# Patient Record
Sex: FEMALE | Race: WHITE | NOT HISPANIC OR LATINO | Employment: FULL TIME | URBAN - METROPOLITAN AREA
[De-identification: names, ages, dates, MRNs, and addresses within clinical notes are randomized per-mention and may not be internally consistent; named-entity substitution may affect disease eponyms.]

---

## 2017-05-25 DIAGNOSIS — Z85.3 HISTORY OF BREAST CANCER: Primary | ICD-10-CM

## 2017-05-31 ENCOUNTER — PATIENT MESSAGE (OUTPATIENT)
Dept: INTERNAL MEDICINE | Facility: CLINIC | Age: 62
End: 2017-05-31

## 2017-05-31 ENCOUNTER — PATIENT MESSAGE (OUTPATIENT)
Dept: SURGERY | Facility: CLINIC | Age: 62
End: 2017-05-31

## 2017-05-31 DIAGNOSIS — Z00.00 ANNUAL PHYSICAL EXAM: Primary | ICD-10-CM

## 2017-05-31 NOTE — TELEPHONE ENCOUNTER
notify pt her labs have been entered  For lipid profile, vit d and also would like update thyroid  Order in for quest electronically   She will need to make sure they draw both doctors labs  When she goes

## 2017-06-15 LAB
1,25(OH)2D SERPL-MCNC: 46 PG/ML (ref 18–72)
1,25(OH)2D2 SERPL-MCNC: <8 PG/ML
1,25(OH)2D3 SERPL-MCNC: 46 PG/ML
CHOLEST SERPL-MCNC: 189 MG/DL (ref 125–200)
CHOLEST/HDLC SERPL: 4.8 (CALC)
HDLC SERPL-MCNC: 39 MG/DL
LDLC SERPL CALC-MCNC: 98 MG/DL (CALC)
NONHDLC SERPL-MCNC: 150 MG/DL (CALC)
TRIGL SERPL-MCNC: 259 MG/DL
TSH SERPL-ACNC: 1.94 MIU/L (ref 0.4–4.5)

## 2017-06-21 ENCOUNTER — PATIENT MESSAGE (OUTPATIENT)
Dept: HEMATOLOGY/ONCOLOGY | Facility: CLINIC | Age: 62
End: 2017-06-21

## 2017-06-27 ENCOUNTER — OFFICE VISIT (OUTPATIENT)
Dept: INTERNAL MEDICINE | Facility: CLINIC | Age: 62
End: 2017-06-27
Payer: COMMERCIAL

## 2017-06-27 VITALS
WEIGHT: 178.81 LBS | BODY MASS INDEX: 31.68 KG/M2 | DIASTOLIC BLOOD PRESSURE: 74 MMHG | HEIGHT: 63 IN | SYSTOLIC BLOOD PRESSURE: 114 MMHG | HEART RATE: 82 BPM

## 2017-06-27 DIAGNOSIS — I10 ESSENTIAL HYPERTENSION: ICD-10-CM

## 2017-06-27 DIAGNOSIS — Z85.3 PERSONAL HISTORY OF BREAST CANCER: ICD-10-CM

## 2017-06-27 DIAGNOSIS — Z00.00 ANNUAL PHYSICAL EXAM: Primary | ICD-10-CM

## 2017-06-27 DIAGNOSIS — E78.2 ELEVATED TRIGLYCERIDES WITH HIGH CHOLESTEROL: ICD-10-CM

## 2017-06-27 PROCEDURE — 99396 PREV VISIT EST AGE 40-64: CPT | Mod: S$GLB,,, | Performed by: INTERNAL MEDICINE

## 2017-06-27 PROCEDURE — 99999 PR PBB SHADOW E&M-EST. PATIENT-LVL III: CPT | Mod: PBBFAC,,, | Performed by: INTERNAL MEDICINE

## 2017-06-27 RX ORDER — LISINOPRIL 10 MG/1
TABLET ORAL
Qty: 90 TABLET | Refills: 3 | Status: SHIPPED | OUTPATIENT
Start: 2017-06-27 | End: 2017-08-15 | Stop reason: SDUPTHER

## 2017-06-27 NOTE — PROGRESS NOTES
"Subjective:       Patient ID: Marti Coley is a 61 y.o. female.    Chief Complaint: Annual Exam   this is a 61-year-old who presents today for follow-up checkup.  Patient reports in general she has been doing fairly well .she has been working on trying to get her weight down and exercising a bit more watching sugar intake.  She has follow-up appointment with hematology oncology in the near future and her annual mammogram scheduled.  She did have some blood work prior.  Patient reports she is off for the summer and looking forward to exercising a bit more as well during that timeframe.  She denies chest pain or shortness of breath she's had problems with ongoing loss of smell that has been present for a few years since she had an upper respiratory infection never recovered but she has no further nasal congestion or issues except occasional rare sinus drip she is not using any new medications and denies headaches associated.    HPI     Review of Systems   Constitutional: Negative for activity change and unexpected weight change.   HENT: Positive for rhinorrhea. Negative for hearing loss and trouble swallowing.         Loss of smell    Eyes: Negative for discharge and visual disturbance.   Respiratory: Negative for chest tightness and wheezing.    Cardiovascular: Negative for chest pain and palpitations.   Gastrointestinal: Negative for blood in stool, constipation, diarrhea and vomiting.   Endocrine: Negative for polydipsia and polyuria.   Genitourinary: Negative for difficulty urinating, dysuria, hematuria and menstrual problem.   Musculoskeletal: Negative for arthralgias, joint swelling and neck pain.   Neurological: Negative for weakness and headaches.   Psychiatric/Behavioral: Negative for confusion and dysphoric mood.       Objective:     Blood pressure 114/74, pulse 82, height 5' 3" (1.6 m), weight 81.1 kg (178 lb 12.7 oz).    Physical Exam   Constitutional: No distress.   HENT:   Head: Normocephalic. "   Mouth/Throat: Oropharynx is clear and moist.   Alopecia    Eyes: No scleral icterus.   Neck: Neck supple.   Cardiovascular: Normal rate, regular rhythm and normal heart sounds.  Exam reveals no gallop and no friction rub.    No murmur heard.  Pulmonary/Chest: Effort normal and breath sounds normal. No respiratory distress.   Surgical scar radiation changes    Abdominal: Soft. Bowel sounds are normal. She exhibits no mass. There is no tenderness.   Musculoskeletal: She exhibits no edema.   Neurological: She is alert.   Skin: No erythema.   Psychiatric: She has a normal mood and affect.   Vitals reviewed.      Assessment:       1. Annual physical exam    2. Personal history of breast cancer    3. Essential hypertension    4. Elevated triglycerides with high cholesterol        Plan:       Marti was seen today for annual exam.    Diagnoses and all orders for this visit:    Annual physical exam    Personal history of breast cancer she will keep her upcoming appointment with hematology and breast mammogram as planned      Essential hypertension blood pressure acceptable continue present regimen      Elevated triglycerides with high cholesterolDiscussed with patient reinforced regular exercise dietary measures and continue present fish oil  Continue exercise weight loss      Hypertension blood pressure acceptable continue present regimen  -     lisinopril 10 MG tablet; TAKE 1 TABLET (10 MG TOTAL) BY MOUTH ONCE DAILY.    Discussed chronic loss of smell we discussed ENT appointment for further evaluation she declines but will call if she would like to consider  Recent labs reviewed     follow-up annually sooner if concern

## 2017-06-29 ENCOUNTER — OFFICE VISIT (OUTPATIENT)
Dept: OBSTETRICS AND GYNECOLOGY | Facility: CLINIC | Age: 62
End: 2017-06-29
Attending: OBSTETRICS & GYNECOLOGY
Payer: COMMERCIAL

## 2017-06-29 VITALS
DIASTOLIC BLOOD PRESSURE: 82 MMHG | WEIGHT: 174.63 LBS | HEIGHT: 63 IN | BODY MASS INDEX: 30.94 KG/M2 | SYSTOLIC BLOOD PRESSURE: 108 MMHG

## 2017-06-29 DIAGNOSIS — Z85.3 PERSONAL HISTORY OF BREAST CANCER: ICD-10-CM

## 2017-06-29 DIAGNOSIS — N76.0 VULVOVAGINITIS: ICD-10-CM

## 2017-06-29 DIAGNOSIS — Z01.419 ENCOUNTER FOR GYNECOLOGICAL EXAMINATION WITHOUT ABNORMAL FINDING: Primary | ICD-10-CM

## 2017-06-29 PROCEDURE — 87480 CANDIDA DNA DIR PROBE: CPT

## 2017-06-29 PROCEDURE — 99999 PR PBB SHADOW E&M-EST. PATIENT-LVL III: CPT | Mod: PBBFAC,,, | Performed by: OBSTETRICS & GYNECOLOGY

## 2017-06-29 PROCEDURE — 99396 PREV VISIT EST AGE 40-64: CPT | Mod: S$GLB,,, | Performed by: OBSTETRICS & GYNECOLOGY

## 2017-06-29 RX ORDER — CLOTRIMAZOLE AND BETAMETHASONE DIPROPIONATE 10; .64 MG/G; MG/G
CREAM TOPICAL
Qty: 45 G | Refills: 6 | Status: SHIPPED | OUTPATIENT
Start: 2017-06-29 | End: 2018-05-29

## 2017-06-29 NOTE — PROGRESS NOTES
"  Subjective:       Patient ID: Marti Coley is a 61 y.o. female.    Chief Complaint:  Well Woman and Vaginitis (Pt says that she has a "chronic yeast infection" and many efforts have been made to control it over the years but nothing has been successful as of yet.)      History of Present Illness  HPI  Marti Coley is a 61 y.o. female  here for her annual GYN exam.  She reports recurrent yeast infection/vulvar irritation. Biopsy done a few years ago just showed chronic inflammation c/w yeast infection..  She describes her periods as stopped  with Chemo/Menopause.  Denies break through /Postmenopausal bleeding.   Denies vaginal itching or irritation.  Denies vaginal discharge.  She is sexually active. She has had 1 partner for the past 24 years .   History of abnormal pap: No  Last Pap: approximate date  and was normal  Last MMG: normal--routine follow-up in 12 months  Last Colonoscopy:  colonoscopy   without abnormalities.  Denies domestic violence. She does feel safe at home.     Past Medical History:   Diagnosis Date    Allergy     Breast cancer     left diagnosed in  - underwent chemotherapy and radiation after lumpectomy    Cancer 2008    left IDC, neoadjuvant chemo, 7mm residual IDC lumpectomy, negative Sn biopsy, adjuvant XRT and hormonal therapy with tamoxifen, ER/SC+, HER-2 negtive    Genetic testing 2008     negative MultiSite BRACAnalysis    HTN (hypertension)     Hyperlipidemia     Osteoarthritis      Past Surgical History:   Procedure Laterality Date    BREAST SURGERY  2008    left lumpectomy with negative SN biopsy    ENDOMETRIAL BIOPSY  11     Social History     Social History    Marital status:      Spouse name: N/A    Number of children: 0    Years of education: N/A     Occupational History     [employer]     Social History Main Topics    Smoking status: Never Smoker    Smokeless tobacco: Never " "Used    Alcohol use No      Comment: allergic    Drug use: No    Sexual activity: Yes     Partners: Male     Birth control/ protection: Post-menopausal      Comment: teacher,   since , 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 Also walks dog.     Family History   Problem Relation Age of Onset    Breast cancer Mother 80    Hypertension Mother     Hyperlipidemia Mother     Breast cancer Sister 32    Cancer Sister      thyroid and breast cancer     Endometrial cancer Sister     Uterine cancer Sister 55     endometrial cancer, diagnosed @ time of hysterectomy for fibroids    Breast cancer Paternal Grandmother 80    Heart disease Father     Cancer Father      bladder cancer     COPD Father     Melanoma Brother     Heart disease Brother     Cancer Brother      melanoma     Ovarian cancer Neg Hx     Diabetes Neg Hx     Stroke Neg Hx      OB History      Para Term  AB Living    0              SAB TAB Ectopic Multiple Live Births                       /82   Ht 5' 3" (1.6 m)   Wt 79.2 kg (174 lb 9.7 oz)   BMI 30.93 kg/m²         GYN & OB History  No LMP recorded. Patient is postmenopausal.   Date of Last Pap: 2016    OB History    Para Term  AB Living   0             SAB TAB Ectopic Multiple Live Births                         Review of Systems  Review of Systems   Constitutional: Negative for activity change, appetite change, fatigue and unexpected weight change.   HENT: Negative.    Eyes: Negative for visual disturbance.   Respiratory: Negative for shortness of breath and wheezing.    Cardiovascular: Negative for chest pain, palpitations and leg swelling.   Gastrointestinal: Negative for abdominal pain, bloating and blood in stool.   Endocrine: Positive for hair loss and hot flashes. Negative for diabetes.   Genitourinary: Negative for decreased libido, " dyspareunia and postmenopausal bleeding.   Musculoskeletal: Negative for back pain and joint swelling.   Skin:  Negative for no acne and hair changes.   Neurological: Negative for headaches.   Hematological: Does not bruise/bleed easily.   Psychiatric/Behavioral: Positive for sleep disturbance. Negative for depression. The patient is not nervous/anxious.    Breast: Negative for breast pain and nipple discharge          Objective:    Physical Exam:   Constitutional: She is oriented to person, place, and time. She appears well-developed and well-nourished.    HENT:   Head: Normocephalic and atraumatic.    Eyes: EOM are normal. Pupils are equal, round, and reactive to light.    Neck: Normal range of motion. Neck supple.    Cardiovascular: Normal rate and regular rhythm.     Pulmonary/Chest: Effort normal and breath sounds normal.   BREASTS: Symmetrical, no skin changes or visible lesions.  No palpable masses, nipple discharge bilaterally.          Abdominal: Soft. Bowel sounds are normal.     Genitourinary:       Pelvic exam was performed with patient supine.   Genitourinary Comments: PELVIC: Normal external genitalia without lesions. Erythema and minor excoriation RIght labia minora/majora; Normal hair distribution.  Adequate perineal body, normal urethral meatus.  Vagina  Dry and poorly rugated, atrophic, without lesions or discharge.  Cervix pink, without lesions, discharge or tenderness.  No significant cystocele or rectocele.  Bimanual exam shows uterus to be normal size, regular, mobile and nontender.  Adnexa without masses or tenderness.    RECTAL:Deferred             Musculoskeletal: Normal range of motion and moves all extremeties.       Neurological: She is alert and oriented to person, place, and time.    Skin: Skin is warm and dry.    Psychiatric: She has a normal mood and affect.          Assessment:        1. Encounter for gynecological examination without abnormal finding    2. Personal history of breast  cancer    3. Vulvovaginitis               Plan:      1. Encounter for gynecological examination without abnormal finding  COUNSELING:  The patient was counseled today on osteoporosis prevention, calcium supplementation, and regular weight bearing exercise. The patient was also counseled today on ACS PAP guidelines, with recommendations for yearly pelvic exams unless their uterus, cervix, and ovaries were removed for benign reasons; in that case, examinations every 3-5 years are recommended. The patient was also counseled regarding monthly breast self-examination, routine STD screening for at-risk populations, prophylactic immunizations for transmitted infections such as HPV, Pertussis, or Influenza as appropriate, and yearly mammograms when indicated by ACS guidelines. She was advised to see her primary care physician for all other health maintenance.   FOLLOW-UP with me for next routine visit.         2. Personal history of breast cancer      3. Vulvovaginitis    - Vaginosis Screen by DNA Probe  - clotrimazole-betamethasone 1-0.05% (LOTRISONE) cream; Apply topically bid  Dispense: 45 g; Refill: 6       Return in about 1 year (around 6/29/2018).

## 2017-06-30 LAB
CANDIDA RRNA VAG QL PROBE: NEGATIVE
G VAGINALIS RRNA GENITAL QL PROBE: NEGATIVE
T VAGINALIS RRNA GENITAL QL PROBE: NEGATIVE

## 2017-07-06 ENCOUNTER — TELEPHONE (OUTPATIENT)
Dept: SURGERY | Facility: CLINIC | Age: 62
End: 2017-07-06

## 2017-07-06 NOTE — TELEPHONE ENCOUNTER
Called patient regarding appointment scheduled with Ángela Massey NP for breast exam and mammogram on Monday 7/31/17.  Patient informed that Ángela will be out of the office on that day and appointments will need to be rescheduled.  The patient has been rescheduled for Thursday 7/27/17 10:30 am mammogram and 11 am breast exam.  The patient voiced understanding of appointment dates and times.  Reminder letter mailed to the patient.

## 2017-07-14 ENCOUNTER — OFFICE VISIT (OUTPATIENT)
Dept: HEMATOLOGY/ONCOLOGY | Facility: CLINIC | Age: 62
End: 2017-07-14
Attending: INTERNAL MEDICINE
Payer: COMMERCIAL

## 2017-07-14 VITALS
HEART RATE: 82 BPM | SYSTOLIC BLOOD PRESSURE: 141 MMHG | BODY MASS INDEX: 31.61 KG/M2 | WEIGHT: 178.38 LBS | DIASTOLIC BLOOD PRESSURE: 66 MMHG | HEIGHT: 63 IN

## 2017-07-14 DIAGNOSIS — Z85.3 PERSONAL HISTORY OF BREAST CANCER: Primary | ICD-10-CM

## 2017-07-14 PROCEDURE — 99214 OFFICE O/P EST MOD 30 MIN: CPT | Mod: S$GLB,,, | Performed by: INTERNAL MEDICINE

## 2017-07-14 PROCEDURE — 99999 PR PBB SHADOW E&M-EST. PATIENT-LVL III: CPT | Mod: PBBFAC,,, | Performed by: INTERNAL MEDICINE

## 2017-07-14 NOTE — PROGRESS NOTES
Subjective:       Patient ID: Marti Coley is a 61 y.o. female.    Chief Complaint: No chief complaint on file.    HPI      Returns for routine follow-up.   Reports doing well in the interval.    States she has lost her sense of smell- occurred about a year ago after an URI. We discussed seeing ENT and she had also been told this by her PCP as well but decided to hold off. Although the other day she did smell bleach and her sinuses opened and felt she could smell better.  No pain.   Weight down slightly- is more active.     She has had insurance changes and does labs and imaging now outside of [facility].     Oncologic History:  In regards to her breast cancer history, she self-palpated a left breast mass   in April 2008. On 04/09/2008, she had a diagnostic mammogram and   ultrasound-guided core biopsy, which revealed ductal adenocarcinoma, ER/DE   positive, HER-2/nikko 2+ by IHC with nonamplified FISH assay. A PET scan on   04/21/2008 showed no evidence of distant metastatic disease. She had a   normal MUGA scan 04/23/2008 with an ejection fraction of 65%.     The patient participated in NSABP B-40 and was randomized to 4 cycles of   Taxotere, Xeloda, and Avastin followed by Adriamycin and Cytoxan x 4 cycles   followed by surgery followed by Avastin maintenance every 3 weeks, all of which   she is now completed.     Final breast pathology post lumpectomy revealed a mildly differentiated,   infiltrating carcinoma with tumor measuring 7 mm showing an excellent   clinical response all margins were negative. There is no lymphovascular   invasion. Negative sentinel lymph node. Final pathologic staging was   [id number].    Negative MultiSite BRACAnalysis 2008   Returns for follow-up on Tamoxifen.    Review of Systems   Constitutional: Negative for activity change, appetite change, chills, fatigue, fever and unexpected weight change.   HENT: Negative for congestion, dental problem, ear pain, hearing loss, mouth sores,  nosebleeds, postnasal drip, sore throat and trouble swallowing.    Eyes: Negative for redness and visual disturbance.   Respiratory: Negative for cough, chest tightness, shortness of breath and wheezing.    Cardiovascular: Negative for chest pain, palpitations and leg swelling.   Gastrointestinal: Negative for abdominal pain, blood in stool, constipation, diarrhea, nausea and vomiting.   Genitourinary: Negative for difficulty urinating, dysuria, frequency, hematuria and urgency.   Musculoskeletal: Negative for arthralgias, back pain, gait problem, joint swelling and neck pain.   Skin: Negative for color change, pallor, rash and wound.   Neurological: Negative for dizziness, weakness, light-headedness, numbness and headaches.   Hematological: Negative for adenopathy. Does not bruise/bleed easily.   Psychiatric/Behavioral: Negative for agitation, confusion, decreased concentration and dysphoric mood. The patient is not nervous/anxious.        Objective:      Physical Exam   Constitutional: She is oriented to person, place, and time. She appears well-developed and well-nourished. No distress.   Presents alone  ECOG= 0   HENT:   Head: Normocephalic and atraumatic.   Right Ear: External ear normal.   Left Ear: External ear normal.   Nose: Nose normal.   Mouth/Throat: Oropharynx is clear and moist. No oropharyngeal exudate.   Eyes: Conjunctivae and EOM are normal. Pupils are equal, round, and reactive to light. Right eye exhibits no discharge. Left eye exhibits no discharge. No scleral icterus. Right pupil is round and reactive. Left pupil is round and reactive.   Neck: Normal range of motion. Neck supple.   Cardiovascular: Normal rate, regular rhythm, normal heart sounds and intact distal pulses.  Exam reveals no gallop and no friction rub.    No murmur heard.  Pulmonary/Chest: Effort normal and breath sounds normal. No respiratory distress. She has no wheezes. She has no rales.   Breasts- no masses, no nipple changes,  no LAD   Abdominal: Soft. Bowel sounds are normal. She exhibits no distension and no mass. There is no hepatosplenomegaly. There is no tenderness. There is no rebound and no guarding.   Musculoskeletal: Normal range of motion. She exhibits no edema or tenderness.   Lymphadenopathy:        Head (right side): No submandibular adenopathy present.        Head (left side): No submandibular adenopathy present.     She has no cervical adenopathy.        Right cervical: No superficial cervical, no deep cervical and no posterior cervical adenopathy present.       Left cervical: No superficial cervical, no deep cervical and no posterior cervical adenopathy present.        Right: No inguinal and no supraclavicular adenopathy present.        Left: No inguinal and no supraclavicular adenopathy present.   Neurological: She is alert and oriented to person, place, and time. She has normal strength and normal reflexes. No cranial nerve deficit or sensory deficit. She exhibits normal muscle tone. Coordination normal.   Skin: Skin is warm and dry. No bruising, no lesion, no petechiae and no rash noted. She is not diaphoretic. No erythema. No pallor.   Psychiatric: She has a normal mood and affect. Her behavior is normal. Judgment and thought content normal. Her mood appears not anxious. She does not exhibit a depressed mood.   Nursing note and vitals reviewed.    Labs- reviewed  Assessment:       1. Personal history of breast cancer        Plan:     1. RISA clinically  RTC 1 year with labs prior and mammogram  Knows to call for any issues  Can stop Tamoxifen in 4/2018.

## 2017-07-26 NOTE — PROGRESS NOTES
Subjective:      Patient ID: Marti Coley is a 61 y.o. female.    Chief Complaint: Breast Cancer Screening (CBE/Hx of Breast Cancer)      HPI: (PF, EPF - 1-3) (Detailed, Comp, - 4)returning patient presents for breast cancer surveillance. Patient denies palpable breast mass, pain, nipple discharge, redness, increased warmth, unexplained weight loss, new onset bone pain. She continues to struggle with recurrent vaginal yeast infections. Continues on endocrine therapy with tamoxifen, scheduled to d/c in April of 2018 per Dr Raymond    Last mmg 7/2016 with no abnormality reported       History of ILC left breast 4/2008, neoadjuvant chemo, lumpectomy with 7mm residual ILC and negative SN biospy 12/2008, adjuvant XRT and remains on hormonal therapy with tamoxifen.    ER/MS +, HER-2 negative      Negative MultiSite BRACAnalysis 2008      Review of Systems   Constitutional: Negative for appetite change and fatigue.   Respiratory: Negative for cough and shortness of breath.    Cardiovascular: Negative for chest pain.   Musculoskeletal: Negative for back pain.     Objective:   Physical Exam   Pulmonary/Chest: She exhibits no mass, no tenderness, no laceration, no edema, no deformity, no swelling and no retraction. Right breast exhibits no inverted nipple, no mass, no nipple discharge, no skin change and no tenderness. Left breast exhibits no inverted nipple, no mass, no nipple discharge, no skin change and no tenderness. Breasts are symmetrical. There is no breast swelling.   No upper extremity lymphedema. Breathing non-labored . Lumpectomy scar left breast at 2 o'clock with minimal fibrosis   Lymphadenopathy:     She has no cervical adenopathy.     She has no axillary adenopathy.        Right: No supraclavicular adenopathy present.        Left: No supraclavicular adenopathy present.     Assessment:       1. Personal history of breast cancer        Plan:       mmg today , no abnormality or changes reported, requests  screening mmg secondary to insurance coverage  Return in one year with screening mmg  Continue f/u with medical oncology as planned, to complete endocrine therapy 4/2018  Call for any interval palpable breast mass, pain, nipple discharge, skin changes or other breast related concerns

## 2017-07-27 ENCOUNTER — OFFICE VISIT (OUTPATIENT)
Dept: SURGERY | Facility: CLINIC | Age: 62
End: 2017-07-27
Payer: COMMERCIAL

## 2017-07-27 ENCOUNTER — HOSPITAL ENCOUNTER (OUTPATIENT)
Dept: RADIOLOGY | Facility: HOSPITAL | Age: 62
Discharge: HOME | End: 2017-07-27
Attending: NURSE PRACTITIONER
Payer: COMMERCIAL

## 2017-07-27 VITALS
BODY MASS INDEX: 31.54 KG/M2 | WEIGHT: 178 LBS | WEIGHT: 178 LBS | BODY MASS INDEX: 31.54 KG/M2 | SYSTOLIC BLOOD PRESSURE: 103 MMHG | HEIGHT: 63 IN | TEMPERATURE: 98 F | HEART RATE: 70 BPM | DIASTOLIC BLOOD PRESSURE: 69 MMHG | HEIGHT: 63 IN

## 2017-07-27 DIAGNOSIS — Z85.3 PERSONAL HISTORY OF BREAST CANCER: Primary | ICD-10-CM

## 2017-07-27 DIAGNOSIS — Z12.31 VISIT FOR SCREENING MAMMOGRAM: ICD-10-CM

## 2017-07-27 PROCEDURE — 99999 PR PBB SHADOW E&M-EST. PATIENT-LVL III: CPT | Mod: PBBFAC,,, | Performed by: NURSE PRACTITIONER

## 2017-07-27 PROCEDURE — 77067 SCR MAMMO BI INCL CAD: CPT | Mod: TC

## 2017-07-27 PROCEDURE — 77067 SCR MAMMO BI INCL CAD: CPT | Mod: 26,,, | Performed by: RADIOLOGY

## 2017-07-27 PROCEDURE — 99213 OFFICE O/P EST LOW 20 MIN: CPT | Mod: S$GLB,,, | Performed by: NURSE PRACTITIONER

## 2017-07-27 PROCEDURE — 77063 BREAST TOMOSYNTHESIS BI: CPT | Mod: 26,,, | Performed by: RADIOLOGY

## 2017-08-15 RX ORDER — LISINOPRIL 10 MG/1
TABLET ORAL
Qty: 90 TABLET | Refills: 3 | Status: SHIPPED | OUTPATIENT
Start: 2017-08-15 | End: 2018-07-24 | Stop reason: SDUPTHER

## 2017-10-10 ENCOUNTER — TELEPHONE (OUTPATIENT)
Dept: INTERNAL MEDICINE | Facility: CLINIC | Age: 62
End: 2017-10-10

## 2017-10-10 NOTE — TELEPHONE ENCOUNTER
----- Message from Isaak Brewer sent at 10/10/2017 10:27 AM CDT -----  Contact: self/   Type: Sooner appointment than  is able to schedule    When is the first available appointment? 12/04/2017    What is the nature of the appointment? Exhaustion    What appointment type: f/u    Comments: Pt has been feeling more tired than usual for a couple of weeks and would like to get access to an appt to speak with the doctor about it.  She would like access to an appt any day between 3-closing.  (Not 10/12/2017).  Please call and advise.    Thank you

## 2017-10-11 ENCOUNTER — OFFICE VISIT (OUTPATIENT)
Dept: INTERNAL MEDICINE | Facility: CLINIC | Age: 62
End: 2017-10-11
Payer: COMMERCIAL

## 2017-10-11 ENCOUNTER — HOSPITAL ENCOUNTER (OUTPATIENT)
Dept: RADIOLOGY | Facility: HOSPITAL | Age: 62
Discharge: HOME | End: 2017-10-11
Attending: INTERNAL MEDICINE
Payer: COMMERCIAL

## 2017-10-11 VITALS
WEIGHT: 179.25 LBS | HEART RATE: 100 BPM | DIASTOLIC BLOOD PRESSURE: 90 MMHG | OXYGEN SATURATION: 97 % | SYSTOLIC BLOOD PRESSURE: 122 MMHG | HEIGHT: 63 IN | BODY MASS INDEX: 31.76 KG/M2 | TEMPERATURE: 100 F

## 2017-10-11 DIAGNOSIS — R50.9 FEVER, UNSPECIFIED FEVER CAUSE: ICD-10-CM

## 2017-10-11 DIAGNOSIS — M79.10 MYALGIA: ICD-10-CM

## 2017-10-11 DIAGNOSIS — R82.90 ABNORMAL URINALYSIS: ICD-10-CM

## 2017-10-11 DIAGNOSIS — R53.83 FATIGUE, UNSPECIFIED TYPE: Primary | ICD-10-CM

## 2017-10-11 LAB
BILIRUB SERPL-MCNC: ABNORMAL MG/DL
BLOOD URINE, POC: ABNORMAL
COLOR, POC UA: YELLOW
GLUCOSE UR QL STRIP: NORMAL
KETONES UR QL STRIP: ABNORMAL
LEUKOCYTE ESTERASE URINE, POC: ABNORMAL
NITRITE, POC UA: ABNORMAL
PH, POC UA: 5
PROTEIN, POC: ABNORMAL
SPECIFIC GRAVITY, POC UA: 1.01
UROBILINOGEN, POC UA: NORMAL

## 2017-10-11 PROCEDURE — 87086 URINE CULTURE/COLONY COUNT: CPT

## 2017-10-11 PROCEDURE — 87186 SC STD MICRODIL/AGAR DIL: CPT

## 2017-10-11 PROCEDURE — 81002 URINALYSIS NONAUTO W/O SCOPE: CPT | Mod: S$GLB,,, | Performed by: INTERNAL MEDICINE

## 2017-10-11 PROCEDURE — 99214 OFFICE O/P EST MOD 30 MIN: CPT | Mod: 25,S$GLB,, | Performed by: INTERNAL MEDICINE

## 2017-10-11 PROCEDURE — 99999 PR PBB SHADOW E&M-EST. PATIENT-LVL V: CPT | Mod: PBBFAC,,, | Performed by: INTERNAL MEDICINE

## 2017-10-11 PROCEDURE — 71020 XR CHEST PA AND LATERAL: CPT | Mod: TC

## 2017-10-11 PROCEDURE — 87077 CULTURE AEROBIC IDENTIFY: CPT

## 2017-10-11 PROCEDURE — 87088 URINE BACTERIA CULTURE: CPT

## 2017-10-11 PROCEDURE — 71020 XR CHEST PA AND LATERAL: CPT | Mod: 26,,, | Performed by: RADIOLOGY

## 2017-10-11 RX ORDER — ZINC GLUCONATE 50 MG
50 TABLET ORAL DAILY
COMMUNITY
End: 2018-07-11

## 2017-10-11 RX ORDER — MAGNESIUM 30 MG
TABLET ORAL ONCE
COMMUNITY
End: 2018-05-29

## 2017-10-11 RX ORDER — MUPIROCIN 20 MG/G
OINTMENT TOPICAL 3 TIMES DAILY
Qty: 30 G | Refills: 0 | Status: SHIPPED | OUTPATIENT
Start: 2017-10-11 | End: 2020-07-09

## 2017-10-11 NOTE — PROGRESS NOTES
Subjective:       Patient ID: Marti Coley is a 61 y.o. female.    Chief Complaint: Fatigue (for weeks )    Patient reports that she is always tired.  She drags herself out of bed and off to work.  Takes a nap when she gets home.  Sleep is interrupted by multiple factors.  Doesn't think she snores.  She is not rested when she gets up      Fatigue   This is a new problem. The current episode started more than 1 month ago. The problem occurs constantly. The problem has been unchanged. Associated symptoms include fatigue, a fever, headaches and myalgias. Pertinent negatives include no abdominal pain, anorexia, arthralgias, chest pain, chills, congestion, coughing, diaphoresis, nausea, numbness, rash, sore throat, urinary symptoms, vertigo, vomiting or weakness. Nothing aggravates the symptoms. She has tried nothing for the symptoms.     Review of Systems   Constitutional: Positive for fatigue and fever. Negative for activity change, chills and diaphoresis.   HENT: Negative for congestion, ear pain, nosebleeds, postnasal drip, sinus pressure and sore throat.    Eyes: Negative.  Negative for visual disturbance.   Respiratory: Negative for cough, chest tightness, shortness of breath and wheezing.    Cardiovascular: Negative for chest pain.   Gastrointestinal: Negative for abdominal pain, anorexia, diarrhea, nausea and vomiting.   Genitourinary: Negative for difficulty urinating, dysuria, frequency and urgency.   Musculoskeletal: Positive for myalgias. Negative for arthralgias and neck stiffness.   Skin: Negative for rash.   Neurological: Positive for headaches. Negative for dizziness, vertigo, weakness and numbness.   Psychiatric/Behavioral: Negative for sleep disturbance. The patient is not nervous/anxious.        Objective:      Physical Exam   Constitutional: She is oriented to person, place, and time. She appears well-developed and well-nourished.  Non-toxic appearance. No distress.   HENT:   Head: Normocephalic  and atraumatic.   Right Ear: Tympanic membrane, external ear and ear canal normal.   Left Ear: Tympanic membrane, external ear and ear canal normal.   Eyes: EOM are normal. Pupils are equal, round, and reactive to light. No scleral icterus.   Neck: Normal range of motion. Neck supple. No thyromegaly present.   Cardiovascular: Normal rate, regular rhythm and normal heart sounds.    Pulmonary/Chest: Effort normal and breath sounds normal.   Abdominal: Soft. Bowel sounds are normal. She exhibits no mass. There is no tenderness. There is no rebound.   Musculoskeletal: Normal range of motion.   Lymphadenopathy:     She has no cervical adenopathy.   Neurological: She is alert and oriented to person, place, and time. She has normal reflexes. She displays normal reflexes. No cranial nerve deficit. She exhibits normal muscle tone. Coordination normal.   Skin: Skin is warm and dry.   Psychiatric: She has a normal mood and affect. Her behavior is normal.   Vitals reviewed.      Assessment:       1. Fatigue, unspecified type    2. Myalgia    3. Fever, unspecified fever cause    4. Abnormal urinalysis        Plan:   Marti was seen today for fatigue.    Diagnoses and all orders for this visit:    Fatigue, unspecified type  -     CBC auto differential; Future  -     Comprehensive metabolic panel; Future  -     TSH; Future  -     Ambulatory consult to Sleep Disorders    Myalgia  -     Sedimentation rate, manual; Future  -     C-reactive protein; Future  -     Rheumatoid factor; Future  -     CK; Future    Fever, unspecified fever cause  -     POCT urine dipstick without microscope  -     X-Ray Chest PA And Lateral; Future    Abnormal urinalysis  -     Urine culture    Other orders  -     mupirocin (BACTROBAN) 2 % ointment; Apply topically 3 (three) times daily.

## 2017-10-12 PROBLEM — J84.10 CALCIFIED GRANULOMA OF LUNG: Status: ACTIVE | Noted: 2017-10-12

## 2017-10-12 PROBLEM — J98.4 CALCIFIED GRANULOMA OF LUNG: Status: ACTIVE | Noted: 2017-10-12

## 2017-10-14 ENCOUNTER — PATIENT MESSAGE (OUTPATIENT)
Dept: INTERNAL MEDICINE | Facility: CLINIC | Age: 62
End: 2017-10-14

## 2017-10-14 LAB — BACTERIA UR CULT: NORMAL

## 2017-10-14 RX ORDER — SULFAMETHOXAZOLE AND TRIMETHOPRIM 800; 160 MG/1; MG/1
1 TABLET ORAL 2 TIMES DAILY
Qty: 14 TABLET | Refills: 0 | Status: SHIPPED | OUTPATIENT
Start: 2017-10-14 | End: 2017-10-21

## 2017-10-15 ENCOUNTER — PATIENT MESSAGE (OUTPATIENT)
Dept: INTERNAL MEDICINE | Facility: CLINIC | Age: 62
End: 2017-10-15

## 2017-10-22 ENCOUNTER — PATIENT MESSAGE (OUTPATIENT)
Dept: INTERNAL MEDICINE | Facility: CLINIC | Age: 62
End: 2017-10-22

## 2017-10-24 DIAGNOSIS — R82.90 ABNORMAL URINE: Primary | ICD-10-CM

## 2017-10-24 NOTE — TELEPHONE ENCOUNTER
Pt called reviewed with her  She has shingles discussed could explain symptoms  Discussed andrew she would like to drop off another urine   Order in

## 2017-10-26 DIAGNOSIS — C50.911 MALIGNANT NEOPLASM OF RIGHT BREAST: ICD-10-CM

## 2017-10-26 RX ORDER — TAMOXIFEN CITRATE 20 MG/1
TABLET ORAL
Qty: 90 TABLET | Refills: 4 | Status: SHIPPED | OUTPATIENT
Start: 2017-10-26 | End: 2018-05-29

## 2017-10-27 ENCOUNTER — LAB VISIT (OUTPATIENT)
Dept: LAB | Facility: HOSPITAL | Age: 62
End: 2017-10-27
Attending: INTERNAL MEDICINE
Payer: COMMERCIAL

## 2017-10-27 DIAGNOSIS — R82.90 ABNORMAL URINE: ICD-10-CM

## 2017-10-27 LAB
BILIRUB UR QL STRIP: NEGATIVE
CLARITY UR REFRACT.AUTO: CLEAR
COLOR UR AUTO: YELLOW
GLUCOSE UR QL STRIP: NEGATIVE
HGB UR QL STRIP: NEGATIVE
KETONES UR QL STRIP: NEGATIVE
LEUKOCYTE ESTERASE UR QL STRIP: ABNORMAL
MICROSCOPIC COMMENT: NORMAL
NITRITE UR QL STRIP: NEGATIVE
PH UR STRIP: 7 [PH] (ref 5–8)
PROT UR QL STRIP: NEGATIVE
RBC #/AREA URNS AUTO: 0 /HPF (ref 0–4)
SP GR UR STRIP: 1.02 (ref 1–1.03)
SQUAMOUS #/AREA URNS AUTO: 2 /HPF
URN SPEC COLLECT METH UR: ABNORMAL
UROBILINOGEN UR STRIP-ACNC: 4 EU/DL
WBC #/AREA URNS AUTO: 5 /HPF (ref 0–5)

## 2017-10-27 PROCEDURE — 87088 URINE BACTERIA CULTURE: CPT

## 2017-10-27 PROCEDURE — 87086 URINE CULTURE/COLONY COUNT: CPT

## 2017-10-27 PROCEDURE — 87077 CULTURE AEROBIC IDENTIFY: CPT

## 2017-10-27 PROCEDURE — 87186 SC STD MICRODIL/AGAR DIL: CPT

## 2017-10-27 PROCEDURE — 81001 URINALYSIS AUTO W/SCOPE: CPT

## 2017-10-30 ENCOUNTER — TELEPHONE (OUTPATIENT)
Dept: INTERNAL MEDICINE | Facility: CLINIC | Age: 62
End: 2017-10-30

## 2017-11-02 ENCOUNTER — PATIENT MESSAGE (OUTPATIENT)
Dept: INTERNAL MEDICINE | Facility: CLINIC | Age: 62
End: 2017-11-02

## 2017-11-02 ENCOUNTER — TELEPHONE (OUTPATIENT)
Dept: INTERNAL MEDICINE | Facility: CLINIC | Age: 62
End: 2017-11-02

## 2017-11-02 NOTE — TELEPHONE ENCOUNTER
----- Message from Evelyn Carol sent at 10/31/2017  3:04 PM CDT -----  Contact: pt [phone]  Patient is returning a phone call.  Who left a message for the patient: Hermann Rosales  Does patient know what this is regarding: pt said the  left her a message on yesterday   Comments:

## 2017-11-03 LAB — BACTERIA UR CULT: NORMAL

## 2018-04-20 DIAGNOSIS — Z85.3 PERSONAL HISTORY OF BREAST CANCER: Primary | ICD-10-CM

## 2018-05-01 ENCOUNTER — TELEPHONE (OUTPATIENT)
Dept: INTERNAL MEDICINE | Facility: CLINIC | Age: 63
End: 2018-05-01

## 2018-05-01 DIAGNOSIS — Z00.00 ANNUAL PHYSICAL EXAM: Primary | ICD-10-CM

## 2018-05-08 ENCOUNTER — PATIENT MESSAGE (OUTPATIENT)
Dept: INTERNAL MEDICINE | Facility: CLINIC | Age: 63
End: 2018-05-08

## 2018-05-08 ENCOUNTER — PATIENT MESSAGE (OUTPATIENT)
Dept: HEMATOLOGY/ONCOLOGY | Facility: CLINIC | Age: 63
End: 2018-05-08

## 2018-05-29 ENCOUNTER — HOSPITAL ENCOUNTER (OUTPATIENT)
Dept: RADIOLOGY | Facility: HOSPITAL | Age: 63
Discharge: HOME | End: 2018-05-29
Attending: NURSE PRACTITIONER
Payer: COMMERCIAL

## 2018-05-29 ENCOUNTER — OFFICE VISIT (OUTPATIENT)
Dept: ORTHOPEDICS | Facility: CLINIC | Age: 63
End: 2018-05-29
Payer: COMMERCIAL

## 2018-05-29 VITALS — WEIGHT: 179.13 LBS | HEIGHT: 63 IN | BODY MASS INDEX: 31.74 KG/M2

## 2018-05-29 DIAGNOSIS — M25.561 PAIN IN BOTH KNEES, UNSPECIFIED CHRONICITY: Primary | ICD-10-CM

## 2018-05-29 DIAGNOSIS — M25.561 PAIN IN BOTH KNEES, UNSPECIFIED CHRONICITY: ICD-10-CM

## 2018-05-29 DIAGNOSIS — M25.562 PAIN IN BOTH KNEES, UNSPECIFIED CHRONICITY: Primary | ICD-10-CM

## 2018-05-29 DIAGNOSIS — M25.562 PAIN IN BOTH KNEES, UNSPECIFIED CHRONICITY: ICD-10-CM

## 2018-05-29 PROCEDURE — 3008F BODY MASS INDEX DOCD: CPT | Mod: CPTII,S$GLB,, | Performed by: NURSE PRACTITIONER

## 2018-05-29 PROCEDURE — 99999 PR PBB SHADOW E&M-EST. PATIENT-LVL III: CPT | Mod: PBBFAC,,, | Performed by: NURSE PRACTITIONER

## 2018-05-29 PROCEDURE — 73564 X-RAY EXAM KNEE 4 OR MORE: CPT | Mod: TC,50

## 2018-05-29 PROCEDURE — 99203 OFFICE O/P NEW LOW 30 MIN: CPT | Mod: S$GLB,,, | Performed by: NURSE PRACTITIONER

## 2018-05-29 PROCEDURE — 73564 X-RAY EXAM KNEE 4 OR MORE: CPT | Mod: 26,50,, | Performed by: RADIOLOGY

## 2018-05-29 RX ORDER — CELECOXIB 200 MG/1
200 CAPSULE ORAL 2 TIMES DAILY
Qty: 60 CAPSULE | Refills: 1 | Status: SHIPPED | OUTPATIENT
Start: 2018-05-29 | End: 2018-06-26 | Stop reason: SDUPTHER

## 2018-05-29 NOTE — LETTER
May 30, 2018      Lucero Mccrary MD  [address]  [facility] [address]           [facility] - Orthopedics  [address], [address]  [facility] [address]  Phone: [phone]          Patient: Marti Coley   MR Number: [id number]   YOB: 1955   Date of Visit: 5/29/2018       Dear Dr. Lucero Mccrary:    Thank you for referring Marti Coley to me for evaluation. Attached you will find relevant portions of my assessment and plan of care.    If you have questions, please do not hesitate to call me. I look forward to following Marti Coley along with you.    Sincerely,    Marilu Edwards NP    Enclosure  CC:  No Recipients    If you would like to receive this communication electronically, please contact [email or web address] or [phone] to request more information on [employer] access.    For providers and/or their staff who would like to refer a patient to [facility], please contact us through our one-stop-shop provider referral line, [facility], at [phone].    If you feel you have received this communication in error or would no longer like to receive these types of communications, please e-mail [email or web address]

## 2018-05-30 ENCOUNTER — TELEPHONE (OUTPATIENT)
Dept: SURGERY | Facility: CLINIC | Age: 63
End: 2018-05-30

## 2018-05-30 DIAGNOSIS — Z12.31 SCREENING MAMMOGRAM, ENCOUNTER FOR: Primary | ICD-10-CM

## 2018-05-30 NOTE — TELEPHONE ENCOUNTER
----- Message from Mandeep Rodriguez sent at 5/30/2018  4:19 PM CDT -----  [phone]//pt needs a mmg order to go with her appt//there is one in the system for another dr//please call//thank you

## 2018-05-30 NOTE — TELEPHONE ENCOUNTER
Contacted the patient regarding the message below.  The patient is scheduled to be seen on Monday 7/30/18 at 9:45 am breast exam with Ángela Toscano NP and 10:30 am screening mammogram.  The patient voiced understanding of appointment date and time.  Reminder letter mailed to the patient.

## 2018-05-30 NOTE — PROGRESS NOTES
CC: Pain of the Right Knee and Pain of the Left Knee      HPI: Pt with bilateral knee pain for the past several years. The pain is worst at night when she lays down and when she's getting up and down from a chair. She was seen 4 years ago and given exercises for patellofemoral syndrome and she states that she was faithful about doing them initially, but she has gotten lax. She does not exercise regularly. She is a . She has taken mobic in the past, but it gave her a headache. She has only been taking Tylenol PM occasionally to help her sleep with the pain. She is ambulating without assistive device. There is not a limp.    ROS  General: denies fever and chills  Resp: no c/o sob  CVS: no c/o cp  MSK: c/o bilateral knee pain which is aching and located over the kneecap. The pain is aching     PE  General: AAOx3, pleasant and cooperative  Resp: respirations even and unlabored  MSK: bilateral knee exam  0 degrees extension  130 degrees flexion  No warmth or erythema   - effusion    Xray:  Reviewed by me: Bones are fairly well mineralized.  Small osteophytes noted.  Minimal joint space narrowing.  Narrowing at the right patellofemoral joint more so than left    Assessment:  Knee djd with patella subluxation    Plan:  Discussed treatment options with the patient. She will resume straight leg raises and exercise on the stationary bike to improve her quadriceps strength. Will try celebrex in light of previous headache with mobic. She will f/u for injections if no improvement.

## 2018-06-06 ENCOUNTER — PATIENT MESSAGE (OUTPATIENT)
Dept: ORTHOPEDICS | Facility: CLINIC | Age: 63
End: 2018-06-06

## 2018-06-22 LAB
25(OH)D3 SERPL-MCNC: 24 NG/ML (ref 30–100)
ALBUMIN SERPL-MCNC: 4.5 G/DL (ref 3.6–5.1)
ALBUMIN/GLOB SERPL: 1.9 (CALC) (ref 1–2.5)
ALP SERPL-CCNC: 69 U/L (ref 33–130)
ALT SERPL-CCNC: 33 U/L (ref 6–29)
AST SERPL-CCNC: 22 U/L (ref 10–35)
BASOPHILS # BLD AUTO: 18 CELLS/UL (ref 0–200)
BASOPHILS NFR BLD AUTO: 0.3 %
BILIRUB DIRECT SERPL-MCNC: 0.1 MG/DL
BILIRUB INDIRECT SERPL-MCNC: 0.4 MG/DL (CALC) (ref 0.2–1.2)
BILIRUB SERPL-MCNC: 0.5 MG/DL (ref 0.2–1.2)
BUN SERPL-MCNC: 17 MG/DL (ref 7–25)
BUN/CREAT SERPL: ABNORMAL (CALC) (ref 6–22)
CALCIUM SERPL-MCNC: 9.5 MG/DL (ref 8.6–10.4)
CHLORIDE SERPL-SCNC: 104 MMOL/L (ref 98–110)
CHOLEST SERPL-MCNC: 251 MG/DL
CHOLEST/HDLC SERPL: 5.5 (CALC)
CO2 SERPL-SCNC: 28 MMOL/L (ref 20–31)
CREAT SERPL-MCNC: 0.65 MG/DL (ref 0.5–0.99)
EOSINOPHIL # BLD AUTO: 47 CELLS/UL (ref 15–500)
EOSINOPHIL NFR BLD AUTO: 0.8 %
ERYTHROCYTE [DISTWIDTH] IN BLOOD BY AUTOMATED COUNT: 13.2 % (ref 11–15)
GFR SERPL CREATININE-BSD FRML MDRD: 96 ML/MIN/1.73M2
GLOBULIN SER CALC-MCNC: 2.4 G/DL (CALC) (ref 1.9–3.7)
GLUCOSE SERPL-MCNC: 104 MG/DL (ref 65–99)
HCT VFR BLD AUTO: 41 % (ref 35–45)
HDLC SERPL-MCNC: 46 MG/DL
HGB BLD-MCNC: 13.7 G/DL (ref 11.7–15.5)
LDLC SERPL CALC-MCNC: 168 MG/DL (CALC)
LYMPHOCYTES # BLD AUTO: 1392 CELLS/UL (ref 850–3900)
LYMPHOCYTES NFR BLD AUTO: 23.6 %
MCH RBC QN AUTO: 28.6 PG (ref 27–33)
MCHC RBC AUTO-ENTMCNC: 33.4 G/DL (ref 32–36)
MCV RBC AUTO: 85.6 FL (ref 80–100)
MONOCYTES # BLD AUTO: 389 CELLS/UL (ref 200–950)
MONOCYTES NFR BLD AUTO: 6.6 %
NEUTROPHILS # BLD AUTO: 4053 CELLS/UL (ref 1500–7800)
NEUTROPHILS NFR BLD AUTO: 68.7 %
NONHDLC SERPL-MCNC: 205 MG/DL (CALC)
PLATELET # BLD AUTO: 236 THOUSAND/UL (ref 140–400)
PMV BLD REES-ECKER: 9.6 FL (ref 7.5–12.5)
POTASSIUM SERPL-SCNC: 4.5 MMOL/L (ref 3.5–5.3)
PROT SERPL-MCNC: 6.9 G/DL (ref 6.1–8.1)
RBC # BLD AUTO: 4.79 MILLION/UL (ref 3.8–5.1)
SODIUM SERPL-SCNC: 141 MMOL/L (ref 135–146)
TRIGL SERPL-MCNC: 209 MG/DL
TSH SERPL-ACNC: 2.42 MIU/L (ref 0.4–4.5)
WBC # BLD AUTO: 5.9 THOUSAND/UL (ref 3.8–10.8)

## 2018-06-26 DIAGNOSIS — M25.561 PAIN IN BOTH KNEES, UNSPECIFIED CHRONICITY: ICD-10-CM

## 2018-06-26 DIAGNOSIS — M25.562 PAIN IN BOTH KNEES, UNSPECIFIED CHRONICITY: ICD-10-CM

## 2018-06-26 RX ORDER — CELECOXIB 200 MG/1
200 CAPSULE ORAL 2 TIMES DAILY
Qty: 90 CAPSULE | Refills: 1 | Status: SHIPPED | OUTPATIENT
Start: 2018-06-26 | End: 2019-05-17 | Stop reason: SDUPTHER

## 2018-06-29 ENCOUNTER — OFFICE VISIT (OUTPATIENT)
Dept: INTERNAL MEDICINE | Facility: CLINIC | Age: 63
End: 2018-06-29
Payer: COMMERCIAL

## 2018-06-29 DIAGNOSIS — I10 ESSENTIAL HYPERTENSION: ICD-10-CM

## 2018-06-29 DIAGNOSIS — Z00.00 ANNUAL PHYSICAL EXAM: Primary | ICD-10-CM

## 2018-06-29 DIAGNOSIS — E78.2 ELEVATED TRIGLYCERIDES WITH HIGH CHOLESTEROL: ICD-10-CM

## 2018-06-29 DIAGNOSIS — M19.90 OSTEOARTHRITIS, UNSPECIFIED OSTEOARTHRITIS TYPE, UNSPECIFIED SITE: ICD-10-CM

## 2018-06-29 DIAGNOSIS — Z85.3 PERSONAL HISTORY OF BREAST CANCER: ICD-10-CM

## 2018-06-29 PROCEDURE — 3074F SYST BP LT 130 MM HG: CPT | Mod: CPTII,S$GLB,, | Performed by: INTERNAL MEDICINE

## 2018-06-29 PROCEDURE — 3078F DIAST BP <80 MM HG: CPT | Mod: CPTII,S$GLB,, | Performed by: INTERNAL MEDICINE

## 2018-06-29 PROCEDURE — 99999 PR PBB SHADOW E&M-EST. PATIENT-LVL III: CPT | Mod: PBBFAC,,, | Performed by: INTERNAL MEDICINE

## 2018-06-29 PROCEDURE — 99396 PREV VISIT EST AGE 40-64: CPT | Mod: S$GLB,,, | Performed by: INTERNAL MEDICINE

## 2018-07-01 VITALS
HEIGHT: 63 IN | DIASTOLIC BLOOD PRESSURE: 68 MMHG | HEART RATE: 92 BPM | SYSTOLIC BLOOD PRESSURE: 102 MMHG | BODY MASS INDEX: 32.07 KG/M2 | WEIGHT: 181 LBS

## 2018-07-01 NOTE — PROGRESS NOTES
"Subjective:       Patient ID: Marti Coley is a 62 y.o. female.    Chief Complaint: Annual Exam  62 year old presetns for check up. She reports bp has been good overeall on her medicatons. She had episode shingles earlier this year which resolved withour residual symptoms. She has trouble with arthrtis in her knee and seeing ortho using celebrex on occasion currently. She has upcoming appt with oncology/breast center   She was caring for family members and maybe not eating or exercising as well as she had been hopes to get back to it. She has family hx aneurysm and wanted to consider screening.     HPI  Review of Systems   Constitutional: Negative for activity change and unexpected weight change.   HENT: Negative for hearing loss, rhinorrhea and trouble swallowing.    Eyes: Negative for discharge and visual disturbance.   Respiratory: Negative for chest tightness and wheezing.    Cardiovascular: Negative for chest pain and palpitations.   Gastrointestinal: Negative for blood in stool, constipation, diarrhea and vomiting.   Endocrine: Negative for polydipsia and polyuria.   Genitourinary: Negative for difficulty urinating, dysuria, hematuria and menstrual problem.   Musculoskeletal: Positive for arthralgias. Negative for joint swelling and neck pain.   Neurological: Negative for weakness and headaches.   Psychiatric/Behavioral: Negative for confusion and dysphoric mood.       Objective:     Blood pressure 102/68, pulse 92, height 5' 3" (1.6 m), weight 82.1 kg (181 lb).    Physical Exam   Constitutional: No distress.   HENT:   Head: Normocephalic.   Mouth/Throat: Oropharynx is clear and moist.   Eyes: No scleral icterus.   Neck: Neck supple.   Cardiovascular: Normal rate, regular rhythm and normal heart sounds.  Exam reveals no gallop and no friction rub.    No murmur heard.  Pulmonary/Chest: Effort normal and breath sounds normal. No respiratory distress.   Surgical scar radiation changes    Abdominal: Soft. " Bowel sounds are normal. She exhibits no mass. There is no tenderness.   Musculoskeletal: She exhibits no edema.   Crepitus knee    Neurological: She is alert.   Skin: No erythema.   Psychiatric: She has a normal mood and affect.   Vitals reviewed.      Assessment:       1. Annual physical exam    2. Essential hypertension    3. Elevated triglycerides with high cholesterol    4. Osteoarthritis, unspecified osteoarthritis type, unspecified site    5. Personal history of breast cancer        Plan:       Diagnoses and all orders for this visit:    Annual physical exam    Essential hypertension  bp acceptable contiue current dose  fam hx aaa discussed screening pt would like to proceed   -     Vascular Lab ([facility])  AAA Screening; Future    Elevated triglycerides with high cholesterol  Recent labs reviewed  She has not been eating as well assisting with the care of her family recently   She hopes to get back to exercise improved  diet discussed medicatin if no impromvent  Plan recheck labs in a few months   -     Lipid panel; Future  -     Hemoglobin A1c; Future  -     Basic metabolic panel; Future  -     Hepatic function panel; Future  -     Lipid panel  -     Hemoglobin A1c  -     Basic metabolic panel  -     Hepatic function panel    Osteoarthritis, unspecified osteoarthritis type, unspecified site  She continues to follow with ortho   Taking celebrex once a day if needed risk benefits discussed     Personal history of breast cancer  She conitnues to follow with hem/once and breast center  Has upcoming appt planned  And gyn appt as well     Discussed update tetanus/pertussis    Discussed shingles vaccine when available shingrix   Pt had recent episode shingles no residual     Follow up annually sooner if concern

## 2018-07-01 NOTE — PROGRESS NOTES

## 2018-07-11 ENCOUNTER — OFFICE VISIT (OUTPATIENT)
Dept: OBSTETRICS AND GYNECOLOGY | Facility: CLINIC | Age: 63
End: 2018-07-11
Payer: COMMERCIAL

## 2018-07-11 VITALS
DIASTOLIC BLOOD PRESSURE: 78 MMHG | BODY MASS INDEX: 31.68 KG/M2 | WEIGHT: 178.81 LBS | HEIGHT: 63 IN | SYSTOLIC BLOOD PRESSURE: 111 MMHG

## 2018-07-11 DIAGNOSIS — N95.2 POSTMENOPAUSAL ATROPHIC VAGINITIS: ICD-10-CM

## 2018-07-11 DIAGNOSIS — N76.0 VULVOVAGINITIS: ICD-10-CM

## 2018-07-11 DIAGNOSIS — Z85.3 PERSONAL HISTORY OF BREAST CANCER: ICD-10-CM

## 2018-07-11 DIAGNOSIS — Z01.419 ENCOUNTER FOR GYNECOLOGICAL EXAMINATION WITHOUT ABNORMAL FINDING: Primary | ICD-10-CM

## 2018-07-11 PROCEDURE — 99396 PREV VISIT EST AGE 40-64: CPT | Mod: S$GLB,,, | Performed by: OBSTETRICS & GYNECOLOGY

## 2018-07-11 PROCEDURE — 3078F DIAST BP <80 MM HG: CPT | Mod: CPTII,S$GLB,, | Performed by: OBSTETRICS & GYNECOLOGY

## 2018-07-11 PROCEDURE — 99999 PR PBB SHADOW E&M-EST. PATIENT-LVL III: CPT | Mod: PBBFAC,,, | Performed by: OBSTETRICS & GYNECOLOGY

## 2018-07-11 PROCEDURE — 3074F SYST BP LT 130 MM HG: CPT | Mod: CPTII,S$GLB,, | Performed by: OBSTETRICS & GYNECOLOGY

## 2018-07-11 RX ORDER — CLOTRIMAZOLE AND BETAMETHASONE DIPROPIONATE 10; .64 MG/G; MG/G
CREAM TOPICAL 2 TIMES DAILY
Refills: 6 | COMMUNITY
Start: 2018-06-05 | End: 2019-07-15 | Stop reason: SDUPTHER

## 2018-07-11 RX ORDER — CLOBETASOL PROPIONATE 0.5 MG/G
OINTMENT TOPICAL 2 TIMES DAILY
Qty: 30 G | Refills: 6 | Status: SHIPPED | OUTPATIENT
Start: 2018-07-11 | End: 2021-07-26

## 2018-07-11 NOTE — PROGRESS NOTES
Subjective:       Patient ID: Marti Coley is a 62 y.o. female.    Chief Complaint:  Well Woman      History of Present Illness  HPI    Marti Coley is a 62 y.o. female  here for her annual GYN exam.    She describes her periods as stopped in ,  denies break through bleeding.   reports vaginal itching or irritation.  Denies vaginal discharge.  She is sexually active. She has had 1 partner for the past 22 years .   History of abnormal pap: No  Last Pap: approximate date  and was normal  Last MMG: normal--routine follow-up in 12 months  Last Colonoscopy:  colonoscopy 7 years ago without abnormalities.  denies domestic violence. She does feel safe at home.     Past Medical History:   Diagnosis Date    Allergy     Breast cancer     left diagnosed in  - underwent chemotherapy and radiation after lumpectomy    Cancer 2008    left IDC, neoadjuvant chemo, 7mm residual IDC lumpectomy, negative Sn biopsy, adjuvant XRT and hormonal therapy with tamoxifen, ER/FL+, HER-2 negtive    Genetic testing 2008     negative MultiSite BRACAnalysis    HTN (hypertension)     Hyperlipidemia     Osteoarthritis      Past Surgical History:   Procedure Laterality Date    BREAST LUMPECTOMY Left     BREAST SURGERY  2008    left lumpectomy with negative SN biopsy    ENDOMETRIAL BIOPSY  11     Social History     Social History    Marital status:      Spouse name: N/A    Number of children: 0    Years of education: N/A     Occupational History     [employer]     Social History Main Topics    Smoking status: Never Smoker    Smokeless tobacco: Never Used    Alcohol use No      Comment: allergic    Drug use: No    Sexual activity: Yes     Partners: Male     Birth control/ protection: Post-menopausal      Comment: teacher,   since , 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 Also walks dog.     Family History   Problem Relation Age of Onset    Breast cancer Mother 80    Hypertension Mother     Hyperlipidemia Mother     Breast cancer Sister 32        negative genetic testing 2017, multi gene panel     Cancer Sister         thyroid and uterine cancer, breast, follicular lymphoma     Endometrial cancer Sister     Uterine cancer Sister 55        endometrial cancer, diagnosed @ time of hysterectomy for fibroids    Breast cancer Paternal Grandmother 80    Heart disease Father     Cancer Father         bladder cancer     COPD Father     Aneurysm Father     Melanoma Brother     Heart disease Brother     Cancer Brother         melanoma     Ovarian cancer Neg Hx     Diabetes Neg Hx     Stroke Neg Hx      OB History      Para Term  AB Living    0              SAB TAB Ectopic Multiple Live Births                       /78   Ht 5' 3" (1.6 m)   Wt 81.1 kg (178 lb 12.7 oz)   BMI 31.67 kg/m²         GYN & OB History  No LMP recorded. Patient is postmenopausal.   Date of Last Pap: 2016    OB History    Para Term  AB Living   0             SAB TAB Ectopic Multiple Live Births                         Review of Systems  Review of Systems   Constitutional: Negative for activity change, appetite change, fatigue and unexpected weight change.   HENT: Negative.    Eyes: Negative for visual disturbance.   Respiratory: Negative for shortness of breath and wheezing.    Cardiovascular: Negative for chest pain, palpitations and leg swelling.   Gastrointestinal: Negative for abdominal pain, bloating and blood in stool.   Endocrine: Negative for diabetes, hair loss and hot flashes.   Genitourinary: Positive for dyspareunia. Negative for decreased libido.   Musculoskeletal: Negative for back pain and joint swelling.   Skin:  Negative for no acne and hair changes.   Neurological: Negative for headaches.   Hematological: " Does not bruise/bleed easily.   Psychiatric/Behavioral: Negative for depression and sleep disturbance. The patient is not nervous/anxious.    Breast: Negative for breast pain and nipple discharge          Objective:    Physical Exam:   Constitutional: She is oriented to person, place, and time. She appears well-developed and well-nourished.    HENT:   Head: Normocephalic and atraumatic.    Eyes: EOM are normal. Pupils are equal, round, and reactive to light.    Neck: Normal range of motion. Neck supple.    Cardiovascular: Normal rate and regular rhythm.     Pulmonary/Chest: Effort normal and breath sounds normal.   BREASTS:  no mass, no tenderness, no deformity and no retraction. Right breast exhibits no inverted nipple, no mass, no nipple discharge, no skin change, no tenderness, no bleeding and no swelling. Left breast exhibits no inverted nipple, no mass, no nipple discharge, no skin change, no tenderness, no bleeding and no swelling. SCARRING OF LEFT BREAST. Breasts are symmetrical.              Abdominal: Soft. Bowel sounds are normal.     Genitourinary:       Pelvic exam was performed with patient supine.   Genitourinary Comments: PELVIC: Normal external genitalia without lesions. ERYTHEMA WITH SKIN THICKENING RIGHT LABIA MINORA/MAJORA. Normal hair distribution.  Adequate perineal body, normal urethral meatus.  Vagina  Dry and poorly rugated, atrophic, without lesions or discharge.  Cervix pink, without lesions, discharge or tenderness.  No significant cystocele or rectocele.  Bimanual exam shows uterus to be NOT READILY PALPABLE SECONDARY TO HABITUS, and nontender.  Adnexa without masses or tenderness.    RECTAL:Deferred             Musculoskeletal: Normal range of motion and moves all extremeties.       Neurological: She is alert and oriented to person, place, and time.    Skin: Skin is warm and dry.    Psychiatric: She has a normal mood and affect.          Assessment:        1. Encounter for gynecological  examination without abnormal finding    2. Personal history of breast cancer    3. Postmenopausal atrophic vaginitis    4. Vulvovaginitis               Plan:        1. Encounter for gynecological examination without abnormal finding  COUNSELING:  The patient was counseled today on osteoporosis prevention, calcium supplementation, and regular weight bearing exercise. The patient was also counseled today on ACS PAP guidelines, with recommendations for yearly pelvic exams unless their uterus, cervix, and ovaries were removed for benign reasons; in that case, examinations every 3-5 years are recommended. The patient was also counseled regarding monthly breast self-examination, routine STD screening for at-risk populations, prophylactic immunizations for transmitted infections such as HPV, Pertussis, or Influenza as appropriate, and yearly mammograms when indicated by ACS guidelines. She was advised to see her primary care physician for all other health maintenance.   FOLLOW-UP with me for next routine visit.         2. Personal history of breast cancer  CONTINUED FOLLOW UP WITH ONCOLOGY    3. Postmenopausal atrophic vaginitis    - prasterone, dhea, (INTRAROSA) 6.5 mg Inst; Place 6.5 mg vaginally every evening.  Dispense: 30 each; Refill: 12    4. Vulvovaginitis    - clobetasol 0.05% (TEMOVATE) 0.05 % Oint; Apply topically 2 (two) times daily. for 10 days  Dispense: 30 g; Refill: 6       Follow-up in about 1 year (around 7/11/2019).

## 2018-07-17 ENCOUNTER — OFFICE VISIT (OUTPATIENT)
Dept: HEMATOLOGY/ONCOLOGY | Facility: CLINIC | Age: 63
End: 2018-07-17
Attending: INTERNAL MEDICINE
Payer: COMMERCIAL

## 2018-07-17 VITALS
HEIGHT: 63 IN | DIASTOLIC BLOOD PRESSURE: 74 MMHG | OXYGEN SATURATION: 96 % | SYSTOLIC BLOOD PRESSURE: 122 MMHG | HEART RATE: 86 BPM | BODY MASS INDEX: 31.61 KG/M2 | TEMPERATURE: 98 F | RESPIRATION RATE: 12 BRPM | WEIGHT: 178.38 LBS

## 2018-07-17 DIAGNOSIS — Z85.3 HISTORY OF BREAST CANCER: ICD-10-CM

## 2018-07-17 PROCEDURE — 3074F SYST BP LT 130 MM HG: CPT | Mod: CPTII,S$GLB,, | Performed by: INTERNAL MEDICINE

## 2018-07-17 PROCEDURE — 99999 PR PBB SHADOW E&M-EST. PATIENT-LVL III: CPT | Mod: PBBFAC,,, | Performed by: INTERNAL MEDICINE

## 2018-07-17 PROCEDURE — 3078F DIAST BP <80 MM HG: CPT | Mod: CPTII,S$GLB,, | Performed by: INTERNAL MEDICINE

## 2018-07-17 PROCEDURE — 3008F BODY MASS INDEX DOCD: CPT | Mod: CPTII,S$GLB,, | Performed by: INTERNAL MEDICINE

## 2018-07-17 PROCEDURE — 99214 OFFICE O/P EST MOD 30 MIN: CPT | Mod: S$GLB,,, | Performed by: INTERNAL MEDICINE

## 2018-07-17 NOTE — PROGRESS NOTES
Subjective:       Patient ID: Marti Coley is a 62 y.o. female.    Chief Complaint: No chief complaint on file.    HPI     In the interval reports quite a bit happening  Lost her mom 3 weeks ago, she passed away at 90 yo  Sister - new diagnosis of follicular lymphoma- currently on surveillance    Returns for routine follow-up.   Reports otherwise doing well in the interval.    Health maintenance-   Colonoscopy in 2011     Oncologic History:  In regards to her breast cancer history, she self-palpated a left breast mass   in April 2008. On 04/09/2008, she had a diagnostic mammogram and   ultrasound-guided core biopsy, which revealed ductal adenocarcinoma, ER/CO   positive, HER-2/nikko 2+ by IHC with nonamplified FISH assay. A PET scan on   04/21/2008 showed no evidence of distant metastatic disease. She had a   normal MUGA scan 04/23/2008 with an ejection fraction of 65%.     The patient participated in NSABP B-40 and was randomized to 4 cycles of   Taxotere, Xeloda, and Avastin followed by Adriamycin and Cytoxan x 4 cycles   followed by surgery followed by Avastin maintenance every 3 weeks, all of which   she is now completed.     Final breast pathology post lumpectomy revealed a mildly differentiated,   infiltrating carcinoma with tumor measuring 7 mm showing an excellent   clinical response all margins were negative. There is no lymphovascular   invasion. Negative sentinel lymph node. Final pathologic staging was   T1N0MX.    Negative MultiSite BRACAnalysis 2008     She completed 10 years on Tamoxifen.    Review of Systems   Constitutional: Negative for activity change, appetite change, chills, fatigue, fever and unexpected weight change.   HENT: Negative for congestion, dental problem, ear pain, hearing loss, mouth sores, nosebleeds, postnasal drip, sore throat and trouble swallowing.    Eyes: Negative for redness and visual disturbance.   Respiratory: Negative for cough, chest tightness, shortness of breath and  wheezing.    Cardiovascular: Negative for chest pain, palpitations and leg swelling.   Gastrointestinal: Negative for abdominal pain, blood in stool, constipation, diarrhea, nausea and vomiting.   Genitourinary: Negative for difficulty urinating, dysuria, frequency, hematuria and urgency.   Musculoskeletal: Negative for arthralgias, back pain, gait problem, joint swelling and neck pain.   Skin: Negative for color change, pallor, rash and wound.   Neurological: Negative for dizziness, weakness, light-headedness, numbness and headaches.   Hematological: Negative for adenopathy. Does not bruise/bleed easily.   Psychiatric/Behavioral: Negative for agitation, confusion, decreased concentration and dysphoric mood. The patient is not nervous/anxious.        Objective:      Physical Exam   Constitutional: She is oriented to person, place, and time. She appears well-developed and well-nourished. No distress.   Presents alone  ECOG= 0   HENT:   Head: Normocephalic and atraumatic.   Right Ear: External ear normal.   Left Ear: External ear normal.   Nose: Nose normal.   Mouth/Throat: Oropharynx is clear and moist. No oropharyngeal exudate.   Eyes: Conjunctivae and EOM are normal. Pupils are equal, round, and reactive to light. Right eye exhibits no discharge. Left eye exhibits no discharge. No scleral icterus. Right pupil is round and reactive. Left pupil is round and reactive.   Neck: Normal range of motion. Neck supple.   Cardiovascular: Normal rate, regular rhythm, normal heart sounds and intact distal pulses.  Exam reveals no gallop and no friction rub.    No murmur heard.  Pulmonary/Chest: Effort normal and breath sounds normal. No respiratory distress. She has no wheezes. She has no rales.   Breasts- no masses, no nipple changes, no LAD   Abdominal: Soft. Bowel sounds are normal. She exhibits no distension and no mass. There is no hepatosplenomegaly. There is no tenderness. There is no rebound and no guarding.    Musculoskeletal: Normal range of motion. She exhibits no edema or tenderness.   Lymphadenopathy:        Head (right side): No submandibular adenopathy present.        Head (left side): No submandibular adenopathy present.     She has no cervical adenopathy.        Right cervical: No superficial cervical, no deep cervical and no posterior cervical adenopathy present.       Left cervical: No superficial cervical, no deep cervical and no posterior cervical adenopathy present.        Right: No inguinal and no supraclavicular adenopathy present.        Left: No inguinal and no supraclavicular adenopathy present.   Neurological: She is alert and oriented to person, place, and time. She has normal strength and normal reflexes. No cranial nerve deficit or sensory deficit. She exhibits normal muscle tone. Coordination normal.   Skin: Skin is warm and dry. No bruising, no lesion, no petechiae and no rash noted. She is not diaphoretic. No erythema. No pallor.   Psychiatric: She has a normal mood and affect. Her behavior is normal. Judgment and thought content normal. Her mood appears not anxious. She does not exhibit a depressed mood.   Nursing note and vitals reviewed.    Labs- revoewed  Assessment:       1. History of breast cancer        Plan:     1. RISA clinically  She is prepared to follow-up with her internist now that she is > 10 years out.  She asked me if this was appropriate so she could minimize doctors and knows that we are always available

## 2018-07-19 PROBLEM — Z85.3 HISTORY OF BREAST CANCER: Status: ACTIVE | Noted: 2018-07-19

## 2018-07-24 RX ORDER — LISINOPRIL 10 MG/1
TABLET ORAL
Qty: 90 TABLET | Refills: 3 | Status: SHIPPED | OUTPATIENT
Start: 2018-07-24 | End: 2019-07-01 | Stop reason: HOSPADM

## 2018-07-31 NOTE — PROGRESS NOTES
Subjective:      Patient ID: Marti Coley is a 62 y.o. female.    Chief Complaint: No chief complaint on file.      HPI: (PF, EPF - 1-3) (Detailed, Comp, - 4) returning patient presents for breast cancer surveillance. Patient denies palpable breast mass, pain, nipple discharge, redness, increased warmth, unexplained weight loss, new onset bone pain.     Her sister was recently diagnosed with lymphoma, making that the fourth malignancy for her , also had negative multigene genetic testing in 2017.      Last mmg 7/27/2017 screening with no abnormality reported       History of ILC left breast 4/2008, ER/NM +, HER-2 negative, neoadjuvant chemo, lumpectomy with 7mm residual ILC and negative SN biospy 12/2008, adjuvant XRT , completed 10 years endocrine therapy in April 2018        Negative MultiSite BRACAnalysis 2008      Review of Systems   Constitutional: Negative for appetite change, fatigue and fever.   Respiratory: Negative for cough and shortness of breath.    Cardiovascular: Negative for chest pain.   Musculoskeletal: Negative for back pain.     Objective:   Physical Exam   Pulmonary/Chest: Right breast exhibits no inverted nipple, no mass, no nipple discharge, no skin change and no tenderness. Left breast exhibits no inverted nipple, no mass, no nipple discharge, no skin change and no tenderness. There is no breast swelling.   Lumpectomy scar left breast at 2 o'clock with minimal scar tissue, slight volume loss , no mass appreciated.    Lymphadenopathy:     She has no cervical adenopathy.     She has no axillary adenopathy.        Right: No supraclavicular adenopathy present.        Left: No supraclavicular adenopathy present.     Assessment:       1. Personal history of breast cancer    2. Family history of breast cancer        Plan:     Clinically RISA  mmg today, no changes or abnormality reported   she can return in one year for CBE and screening mmg  Call for any interval palpable breast mass, pain,  nipple discharge, skin changes or other breast related concerns  Discussed family history of cancers, likely genetic predisposition despite negative genetic testing in this patient and her sister.

## 2018-08-01 ENCOUNTER — HOSPITAL ENCOUNTER (OUTPATIENT)
Dept: RADIOLOGY | Facility: HOSPITAL | Age: 63
Discharge: HOME | End: 2018-08-01
Attending: NURSE PRACTITIONER
Payer: COMMERCIAL

## 2018-08-01 ENCOUNTER — OFFICE VISIT (OUTPATIENT)
Dept: SURGERY | Facility: CLINIC | Age: 63
End: 2018-08-01
Payer: COMMERCIAL

## 2018-08-01 VITALS
BODY MASS INDEX: 31.53 KG/M2 | HEART RATE: 77 BPM | TEMPERATURE: 99 F | DIASTOLIC BLOOD PRESSURE: 69 MMHG | SYSTOLIC BLOOD PRESSURE: 105 MMHG | WEIGHT: 177.94 LBS | HEIGHT: 63 IN

## 2018-08-01 DIAGNOSIS — Z12.31 SCREENING MAMMOGRAM, ENCOUNTER FOR: ICD-10-CM

## 2018-08-01 DIAGNOSIS — Z85.3 PERSONAL HISTORY OF BREAST CANCER: Primary | ICD-10-CM

## 2018-08-01 DIAGNOSIS — Z80.3 FAMILY HISTORY OF BREAST CANCER: ICD-10-CM

## 2018-08-01 PROCEDURE — 77067 SCR MAMMO BI INCL CAD: CPT | Mod: 26,,, | Performed by: RADIOLOGY

## 2018-08-01 PROCEDURE — 77063 BREAST TOMOSYNTHESIS BI: CPT | Mod: 26,,, | Performed by: RADIOLOGY

## 2018-08-01 PROCEDURE — 99213 OFFICE O/P EST LOW 20 MIN: CPT | Mod: S$GLB,,, | Performed by: NURSE PRACTITIONER

## 2018-08-01 PROCEDURE — 3008F BODY MASS INDEX DOCD: CPT | Mod: CPTII,S$GLB,, | Performed by: NURSE PRACTITIONER

## 2018-08-01 PROCEDURE — 99999 PR PBB SHADOW E&M-EST. PATIENT-LVL III: CPT | Mod: PBBFAC,,, | Performed by: NURSE PRACTITIONER

## 2018-08-01 PROCEDURE — 3074F SYST BP LT 130 MM HG: CPT | Mod: CPTII,S$GLB,, | Performed by: NURSE PRACTITIONER

## 2018-08-01 PROCEDURE — 3078F DIAST BP <80 MM HG: CPT | Mod: CPTII,S$GLB,, | Performed by: NURSE PRACTITIONER

## 2018-08-01 PROCEDURE — 77063 BREAST TOMOSYNTHESIS BI: CPT | Mod: TC,PO

## 2019-01-03 ENCOUNTER — OFFICE VISIT (OUTPATIENT)
Dept: ORTHOPEDICS | Facility: CLINIC | Age: 64
End: 2019-01-03
Payer: COMMERCIAL

## 2019-01-03 VITALS
DIASTOLIC BLOOD PRESSURE: 69 MMHG | SYSTOLIC BLOOD PRESSURE: 134 MMHG | WEIGHT: 177.94 LBS | HEIGHT: 63 IN | HEART RATE: 80 BPM | BODY MASS INDEX: 31.53 KG/M2

## 2019-01-03 DIAGNOSIS — M17.0 PRIMARY OSTEOARTHRITIS OF BOTH KNEES: ICD-10-CM

## 2019-01-03 PROCEDURE — 20610 PR DRAIN/INJECT LARGE JOINT/BURSA: ICD-10-PCS | Mod: 50,S$GLB,, | Performed by: NURSE PRACTITIONER

## 2019-01-03 PROCEDURE — 3008F PR BODY MASS INDEX (BMI) DOCUMENTED: ICD-10-PCS | Mod: CPTII,S$GLB,, | Performed by: NURSE PRACTITIONER

## 2019-01-03 PROCEDURE — 99999 PR PBB SHADOW E&M-EST. PATIENT-LVL III: CPT | Mod: PBBFAC,,, | Performed by: NURSE PRACTITIONER

## 2019-01-03 PROCEDURE — 3008F BODY MASS INDEX DOCD: CPT | Mod: CPTII,S$GLB,, | Performed by: NURSE PRACTITIONER

## 2019-01-03 PROCEDURE — 20610 DRAIN/INJ JOINT/BURSA W/O US: CPT | Mod: 50,S$GLB,, | Performed by: NURSE PRACTITIONER

## 2019-01-03 PROCEDURE — 99213 PR OFFICE/OUTPT VISIT, EST, LEVL III, 20-29 MIN: ICD-10-PCS | Mod: 25,S$GLB,, | Performed by: NURSE PRACTITIONER

## 2019-01-03 PROCEDURE — 3078F DIAST BP <80 MM HG: CPT | Mod: CPTII,S$GLB,, | Performed by: NURSE PRACTITIONER

## 2019-01-03 PROCEDURE — 3075F PR MOST RECENT SYSTOLIC BLOOD PRESS GE 130-139MM HG: ICD-10-PCS | Mod: CPTII,S$GLB,, | Performed by: NURSE PRACTITIONER

## 2019-01-03 PROCEDURE — 3075F SYST BP GE 130 - 139MM HG: CPT | Mod: CPTII,S$GLB,, | Performed by: NURSE PRACTITIONER

## 2019-01-03 PROCEDURE — 3078F PR MOST RECENT DIASTOLIC BLOOD PRESSURE < 80 MM HG: ICD-10-PCS | Mod: CPTII,S$GLB,, | Performed by: NURSE PRACTITIONER

## 2019-01-03 PROCEDURE — 99213 OFFICE O/P EST LOW 20 MIN: CPT | Mod: 25,S$GLB,, | Performed by: NURSE PRACTITIONER

## 2019-01-03 PROCEDURE — 99999 PR PBB SHADOW E&M-EST. PATIENT-LVL III: ICD-10-PCS | Mod: PBBFAC,,, | Performed by: NURSE PRACTITIONER

## 2019-01-03 RX ADMIN — TRIAMCINOLONE ACETONIDE 80 MG: 40 INJECTION, SUSPENSION INTRA-ARTICULAR; INTRAMUSCULAR at 12:01

## 2019-01-04 LAB
ALBUMIN SERPL-MCNC: 4.1 G/DL (ref 3.6–5.1)
ALBUMIN/GLOB SERPL: 2 (CALC) (ref 1–2.5)
ALP SERPL-CCNC: 63 U/L (ref 33–130)
ALT SERPL-CCNC: 22 U/L (ref 6–29)
AST SERPL-CCNC: 19 U/L (ref 10–35)
BILIRUB DIRECT SERPL-MCNC: 0.1 MG/DL
BILIRUB INDIRECT SERPL-MCNC: 0.5 MG/DL (CALC) (ref 0.2–1.2)
BILIRUB SERPL-MCNC: 0.6 MG/DL (ref 0.2–1.2)
BUN SERPL-MCNC: 15 MG/DL (ref 7–25)
BUN/CREAT SERPL: NORMAL (CALC) (ref 6–22)
CALCIUM SERPL-MCNC: 9.1 MG/DL (ref 8.6–10.4)
CHLORIDE SERPL-SCNC: 106 MMOL/L (ref 98–110)
CHOLEST SERPL-MCNC: 216 MG/DL
CHOLEST/HDLC SERPL: 4.6 (CALC)
CO2 SERPL-SCNC: 25 MMOL/L (ref 20–32)
CREAT SERPL-MCNC: 0.53 MG/DL (ref 0.5–0.99)
GFR SERPL CREATININE-BSD FRML MDRD: 102 ML/MIN/1.73M2
GLOBULIN SER CALC-MCNC: 2.1 G/DL (CALC) (ref 1.9–3.7)
GLUCOSE SERPL-MCNC: 80 MG/DL (ref 65–99)
HBA1C MFR BLD: 5.4 % OF TOTAL HGB
HDLC SERPL-MCNC: 47 MG/DL
LDLC SERPL CALC-MCNC: 142 MG/DL (CALC)
NONHDLC SERPL-MCNC: 169 MG/DL (CALC)
POTASSIUM SERPL-SCNC: 4.3 MMOL/L (ref 3.5–5.3)
PROT SERPL-MCNC: 6.2 G/DL (ref 6.1–8.1)
SODIUM SERPL-SCNC: 140 MMOL/L (ref 135–146)
TRIGL SERPL-MCNC: 148 MG/DL

## 2019-01-04 RX ORDER — TRIAMCINOLONE ACETONIDE 40 MG/ML
80 INJECTION, SUSPENSION INTRA-ARTICULAR; INTRAMUSCULAR
Status: COMPLETED | OUTPATIENT
Start: 2019-01-03 | End: 2019-01-03

## 2019-01-04 NOTE — PROGRESS NOTES
CC: Pain of the Right Knee and Pain of the Left Knee      HPI: Pt with bilateral knee pain for several months which has recently gotten worse. The pain is aching and located over the patellaWhen she was originally seen by me in May she started Celebrex and that helped her pain a lot, but she was told by her pcp that she shouldn't take it so she stopped and her knee pain returned. She tried resuming it once a day, but there was no pain relief and she went back to twice a day. The pain is relieved with Celebrex twice a day, but she's afraid to continue taking it due to the risks.  We discussed injections at her last appointment, but she was afraid due to a history of toe injection in the past which was very painful. She is ambulating without assistive device. There is not a limp.    ROS  General: denies fever and chills  Resp: no c/o sob  CVS: no c/o cp  MSK: c/o bilateral knee pain which is aching and worse on the right and worse with increased activity    PE  General: AAOx3, pleasant and cooperative  Resp: respirations even and unlabored  MSK: bilateral knee exam  0 degrees extension  120 degrees flexion  No warmth or erythema   - effusion    Xray:  Reviewed by me: Bones are fairly well mineralized.  Small osteophytes noted.  Minimal joint space narrowing.  Narrowing at the right patellofemoral joint more so than left    Assessment:  Bilateral knee djd    Plan:  Discussed treatment options with the patient and she would like to have bilateral cortisone injections today for pain relief. She will take celebrex as needed  RICE  F/u as needed    Knee Injection Procedure Note    Diagnosis: bilateral knee degenerative arthritis  Indications: bilateral knee pain  Procedure Details: Verbal consent was obtained for the procedure. The injection site was identified and the skin was prepared with alcohol. The right knee was injected from an anterolateral approach with 1 ml of Kenalog and 4 ml Lidocaine and the left knee was  injected from an anterolateral approach with 1 ml of kenalog and 4 ml lidocaine under sterile technique using a 22 gauge needle. The needle was removed and the area cleansed and dressed.  Complications:  Patient tolerated the procedure well.    she was advised to rest the knee today, using ice and elevation as needed for comfort and swelling.Immediate relief of the knee pain may be short lived and secondary to the lidocaine. she may have an increase in discomfort tonight followed by steady improvement over the next several days. It may take 1-2 weeks following the injection to get the full benefit of the medication.

## 2019-05-17 ENCOUNTER — OFFICE VISIT (OUTPATIENT)
Dept: ORTHOPEDICS | Facility: CLINIC | Age: 64
End: 2019-05-17
Payer: COMMERCIAL

## 2019-05-17 VITALS
SYSTOLIC BLOOD PRESSURE: 120 MMHG | BODY MASS INDEX: 31.53 KG/M2 | HEART RATE: 82 BPM | HEIGHT: 63 IN | DIASTOLIC BLOOD PRESSURE: 76 MMHG | WEIGHT: 177.94 LBS

## 2019-05-17 DIAGNOSIS — M25.562 PAIN IN BOTH KNEES, UNSPECIFIED CHRONICITY: ICD-10-CM

## 2019-05-17 DIAGNOSIS — M25.561 PAIN IN BOTH KNEES, UNSPECIFIED CHRONICITY: ICD-10-CM

## 2019-05-17 DIAGNOSIS — M17.0 PRIMARY OSTEOARTHRITIS OF BOTH KNEES: Primary | ICD-10-CM

## 2019-05-17 PROCEDURE — 99999 PR PBB SHADOW E&M-EST. PATIENT-LVL III: CPT | Mod: PBBFAC,,, | Performed by: NURSE PRACTITIONER

## 2019-05-17 PROCEDURE — 20610 DRAIN/INJ JOINT/BURSA W/O US: CPT | Mod: 50,S$GLB,, | Performed by: NURSE PRACTITIONER

## 2019-05-17 PROCEDURE — 3008F PR BODY MASS INDEX (BMI) DOCUMENTED: ICD-10-PCS | Mod: CPTII,S$GLB,, | Performed by: NURSE PRACTITIONER

## 2019-05-17 PROCEDURE — 99999 PR PBB SHADOW E&M-EST. PATIENT-LVL III: ICD-10-PCS | Mod: PBBFAC,,, | Performed by: NURSE PRACTITIONER

## 2019-05-17 PROCEDURE — 3074F SYST BP LT 130 MM HG: CPT | Mod: CPTII,S$GLB,, | Performed by: NURSE PRACTITIONER

## 2019-05-17 PROCEDURE — 3008F BODY MASS INDEX DOCD: CPT | Mod: CPTII,S$GLB,, | Performed by: NURSE PRACTITIONER

## 2019-05-17 PROCEDURE — 3078F PR MOST RECENT DIASTOLIC BLOOD PRESSURE < 80 MM HG: ICD-10-PCS | Mod: CPTII,S$GLB,, | Performed by: NURSE PRACTITIONER

## 2019-05-17 PROCEDURE — 99213 PR OFFICE/OUTPT VISIT, EST, LEVL III, 20-29 MIN: ICD-10-PCS | Mod: 25,S$GLB,, | Performed by: NURSE PRACTITIONER

## 2019-05-17 PROCEDURE — 3074F PR MOST RECENT SYSTOLIC BLOOD PRESSURE < 130 MM HG: ICD-10-PCS | Mod: CPTII,S$GLB,, | Performed by: NURSE PRACTITIONER

## 2019-05-17 PROCEDURE — 20610 PR DRAIN/INJECT LARGE JOINT/BURSA: ICD-10-PCS | Mod: 50,S$GLB,, | Performed by: NURSE PRACTITIONER

## 2019-05-17 PROCEDURE — 3078F DIAST BP <80 MM HG: CPT | Mod: CPTII,S$GLB,, | Performed by: NURSE PRACTITIONER

## 2019-05-17 PROCEDURE — 99213 OFFICE O/P EST LOW 20 MIN: CPT | Mod: 25,S$GLB,, | Performed by: NURSE PRACTITIONER

## 2019-05-17 RX ORDER — CELECOXIB 200 MG/1
200 CAPSULE ORAL 2 TIMES DAILY
Qty: 90 CAPSULE | Refills: 1 | Status: SHIPPED | OUTPATIENT
Start: 2019-05-17 | End: 2021-06-29

## 2019-05-17 RX ORDER — DICLOFENAC SODIUM 20 MG/G
40 SOLUTION TOPICAL 2 TIMES DAILY PRN
Qty: 112 G | Refills: 2 | Status: SHIPPED | OUTPATIENT
Start: 2019-05-17 | End: 2020-07-16

## 2019-05-17 RX ADMIN — TRIAMCINOLONE ACETONIDE 80 MG: 40 INJECTION, SUSPENSION INTRA-ARTICULAR; INTRAMUSCULAR at 10:05

## 2019-05-17 NOTE — PROGRESS NOTES
CC: bilateral knee pain      HPI: Pt with c/o bilateral knee pain right>left for the past few weeks. The pain is located over the patella and is aching. There is pain medially in the right knee as well which makes it difficult to sleep at night unless she puts a pillow between her legs. She has taken celebrex intermittently with some relief, but she doesn't like to take oral medication too often. She had cortisone injections in the past with good relief. Her last injection was in January. She works as a  and is up and down all day. She is ambulating without assistive device. There is not a limp.    ROS  General: denies fever and chills  Resp: no c/o sob  CVS: no c/o cp  MSK: c/o bilateral knee pain which is aching and global and worse with activity    PE  General: AAOx3, pleasant and cooperative  Resp: respirations even and unlabored  MSK: bilateral knee exam  0 degrees extension  120 degrees flexion  No warmth or erythema   - effusion    Assessment:  Bilateral knee djd    Plan:  Cortisone injection bilateral knees today  RICE  celebrex prn  pennsaid prn  F/u as needed    Knee Injection Procedure Note    Diagnosis: bilateral knee degenerative arthritis  Indications: bilateral knee pain  Procedure Details: Verbal consent was obtained for the procedure. The injection site was identified and the skin was prepared with alcohol. The bilateral knee was injected from an anterolateral approach with 1 ml of Kenalog and 4 ml Lidocaine each under sterile technique using a 22 gauge needle. The needle was removed and the area cleansed and dressed.  Complications:  Patient tolerated the procedure well.    she was advised to rest the knee today, using ice and elevation as needed for comfort and swelling.Immediate relief of the knee pain may be short lived and secondary to the lidocaine. she may have an increase in discomfort tonight followed by steady improvement over the next several days. It may take 1-2 weeks following  the injection to get the full benefit of the medication.

## 2019-05-27 ENCOUNTER — PATIENT MESSAGE (OUTPATIENT)
Dept: INTERNAL MEDICINE | Facility: CLINIC | Age: 64
End: 2019-05-27

## 2019-05-27 DIAGNOSIS — Z00.00 ANNUAL PHYSICAL EXAM: Primary | ICD-10-CM

## 2019-05-27 NOTE — TELEPHONE ENCOUNTER
Orders are in for quest  As requested  They should be there electronically but okay to print  And mail to her as requested if able

## 2019-06-06 RX ORDER — TRIAMCINOLONE ACETONIDE 40 MG/ML
80 INJECTION, SUSPENSION INTRA-ARTICULAR; INTRAMUSCULAR
Status: COMPLETED | OUTPATIENT
Start: 2019-05-17 | End: 2019-05-17

## 2019-06-12 LAB
25(OH)D3 SERPL-MCNC: 24 NG/ML (ref 30–100)
ALBUMIN SERPL-MCNC: 4.1 G/DL (ref 3.6–5.1)
ALBUMIN/GLOB SERPL: 1.9 (CALC) (ref 1–2.5)
ALP SERPL-CCNC: 59 U/L (ref 33–130)
ALT SERPL-CCNC: 18 U/L (ref 6–29)
AST SERPL-CCNC: 15 U/L (ref 10–35)
BASOPHILS # BLD AUTO: 21 CELLS/UL (ref 0–200)
BASOPHILS NFR BLD AUTO: 0.4 %
BILIRUB DIRECT SERPL-MCNC: 0.1 MG/DL
BILIRUB INDIRECT SERPL-MCNC: 0.7 MG/DL (CALC) (ref 0.2–1.2)
BILIRUB SERPL-MCNC: 0.8 MG/DL (ref 0.2–1.2)
BUN SERPL-MCNC: 16 MG/DL (ref 7–25)
BUN/CREAT SERPL: NORMAL (CALC) (ref 6–22)
CALCIUM SERPL-MCNC: 9.2 MG/DL (ref 8.6–10.4)
CHLORIDE SERPL-SCNC: 106 MMOL/L (ref 98–110)
CHOLEST SERPL-MCNC: 222 MG/DL
CHOLEST/HDLC SERPL: 3.8 (CALC)
CO2 SERPL-SCNC: 31 MMOL/L (ref 20–32)
CREAT SERPL-MCNC: 0.62 MG/DL (ref 0.5–0.99)
EOSINOPHIL # BLD AUTO: 52 CELLS/UL (ref 15–500)
EOSINOPHIL NFR BLD AUTO: 1 %
ERYTHROCYTE [DISTWIDTH] IN BLOOD BY AUTOMATED COUNT: 13.2 % (ref 11–15)
GFRSERPLBLD MDRD-ARVRAT: 97 ML/MIN/1.73M2
GLOBULIN SER CALC-MCNC: 2.2 G/DL (CALC) (ref 1.9–3.7)
GLUCOSE SERPL-MCNC: 82 MG/DL (ref 65–99)
HBA1C MFR BLD: 5.6 % OF TOTAL HGB
HCT VFR BLD AUTO: 43.9 % (ref 35–45)
HDLC SERPL-MCNC: 59 MG/DL
HGB BLD-MCNC: 14.7 G/DL (ref 11.7–15.5)
LDLC SERPL CALC-MCNC: 143 MG/DL (CALC)
LYMPHOCYTES # BLD AUTO: 1373 CELLS/UL (ref 850–3900)
LYMPHOCYTES NFR BLD AUTO: 26.4 %
MCH RBC QN AUTO: 29.1 PG (ref 27–33)
MCHC RBC AUTO-ENTMCNC: 33.5 G/DL (ref 32–36)
MCV RBC AUTO: 86.8 FL (ref 80–100)
MONOCYTES # BLD AUTO: 291 CELLS/UL (ref 200–950)
MONOCYTES NFR BLD AUTO: 5.6 %
NEUTROPHILS # BLD AUTO: 3463 CELLS/UL (ref 1500–7800)
NEUTROPHILS NFR BLD AUTO: 66.6 %
NONHDLC SERPL-MCNC: 163 MG/DL (CALC)
PLATELET # BLD AUTO: 202 THOUSAND/UL (ref 140–400)
PMV BLD REES-ECKER: 10 FL (ref 7.5–12.5)
POTASSIUM SERPL-SCNC: 4.2 MMOL/L (ref 3.5–5.3)
PROT SERPL-MCNC: 6.3 G/DL (ref 6.1–8.1)
RBC # BLD AUTO: 5.06 MILLION/UL (ref 3.8–5.1)
SODIUM SERPL-SCNC: 143 MMOL/L (ref 135–146)
TRIGL SERPL-MCNC: 90 MG/DL
TSH SERPL-ACNC: 1.86 MIU/L (ref 0.4–4.5)
WBC # BLD AUTO: 5.2 THOUSAND/UL (ref 3.8–10.8)

## 2019-06-24 ENCOUNTER — PATIENT OUTREACH (OUTPATIENT)
Dept: ADMINISTRATIVE | Facility: HOSPITAL | Age: 64
End: 2019-06-24

## 2019-07-01 ENCOUNTER — OFFICE VISIT (OUTPATIENT)
Dept: INTERNAL MEDICINE | Facility: CLINIC | Age: 64
End: 2019-07-01
Payer: COMMERCIAL

## 2019-07-01 VITALS
HEART RATE: 73 BPM | HEIGHT: 63 IN | DIASTOLIC BLOOD PRESSURE: 78 MMHG | SYSTOLIC BLOOD PRESSURE: 102 MMHG | WEIGHT: 171.5 LBS | BODY MASS INDEX: 30.39 KG/M2 | OXYGEN SATURATION: 99 %

## 2019-07-01 DIAGNOSIS — I10 ESSENTIAL HYPERTENSION: ICD-10-CM

## 2019-07-01 DIAGNOSIS — E78.2 ELEVATED TRIGLYCERIDES WITH HIGH CHOLESTEROL: ICD-10-CM

## 2019-07-01 DIAGNOSIS — E55.9 VITAMIN D DEFICIENCY: ICD-10-CM

## 2019-07-01 DIAGNOSIS — Z85.3 PERSONAL HISTORY OF BREAST CANCER: ICD-10-CM

## 2019-07-01 DIAGNOSIS — Z00.00 ANNUAL PHYSICAL EXAM: Primary | ICD-10-CM

## 2019-07-01 PROCEDURE — 3078F PR MOST RECENT DIASTOLIC BLOOD PRESSURE < 80 MM HG: ICD-10-PCS | Mod: CPTII,S$GLB,, | Performed by: INTERNAL MEDICINE

## 2019-07-01 PROCEDURE — 99396 PREV VISIT EST AGE 40-64: CPT | Mod: S$GLB,,, | Performed by: INTERNAL MEDICINE

## 2019-07-01 PROCEDURE — 99999 PR PBB SHADOW E&M-EST. PATIENT-LVL III: ICD-10-PCS | Mod: PBBFAC,,, | Performed by: INTERNAL MEDICINE

## 2019-07-01 PROCEDURE — 3078F DIAST BP <80 MM HG: CPT | Mod: CPTII,S$GLB,, | Performed by: INTERNAL MEDICINE

## 2019-07-01 PROCEDURE — 99396 PR PREVENTIVE VISIT,EST,40-64: ICD-10-PCS | Mod: S$GLB,,, | Performed by: INTERNAL MEDICINE

## 2019-07-01 PROCEDURE — 3074F PR MOST RECENT SYSTOLIC BLOOD PRESSURE < 130 MM HG: ICD-10-PCS | Mod: CPTII,S$GLB,, | Performed by: INTERNAL MEDICINE

## 2019-07-01 PROCEDURE — 99999 PR PBB SHADOW E&M-EST. PATIENT-LVL III: CPT | Mod: PBBFAC,,, | Performed by: INTERNAL MEDICINE

## 2019-07-01 PROCEDURE — 3074F SYST BP LT 130 MM HG: CPT | Mod: CPTII,S$GLB,, | Performed by: INTERNAL MEDICINE

## 2019-07-01 NOTE — PROGRESS NOTES
"Subjective:       Patient ID: Marti Coley is a 63 y.o. female.    Chief Complaint: Annual Exam   This is a 63-year-old who presents today for physical.  She reports has been doing well and trying to exercise more regularly since last visit watching her diet  More closely and her weight is coming down.  Patient has been taking low-dose lisinopril but has noted that her blood pressure has been down and she was wondering if she could stop it she reports never had history of high bblood pressure until she was taking treatments for her breast cancer and now that she is off things have improved.  She does check her home numbers and they have been normal low at times.  She had been following with her hematologist oncologist was discharged and now is following with her gynecologist she has an upcoming appointment.  She does have trouble with arthritis in her knees on occasion has had injections with some improvement he will take Celebrex but not on a daily basis.  She has form needs completing for insurance .      HPI  Review of Systems   Constitutional: Negative for fever.   Respiratory: Negative for cough, shortness of breath and wheezing.    Cardiovascular: Negative for chest pain and palpitations.   Gastrointestinal: Negative for abdominal pain and constipation.   Musculoskeletal:        Occasional arthritis knees    Neurological: Negative for dizziness.       Objective:     Blood pressure 102/78, pulse 73, height 5' 3" (1.6 m), weight 77.8 kg (171 lb 8.3 oz), last menstrual period 03/01/2008, SpO2 99 %.    Physical Exam   Constitutional: No distress.   HENT:   Head: Normocephalic.   Mouth/Throat: Oropharynx is clear and moist.   Eyes: No scleral icterus.   Neck: Neck supple.   Cardiovascular: Normal rate, regular rhythm and normal heart sounds. Exam reveals no gallop and no friction rub.   No murmur heard.  Pulmonary/Chest: Effort normal and breath sounds normal. No respiratory distress.   Surgical scar left " breast    Abdominal: Soft. Bowel sounds are normal. She exhibits no mass. There is no tenderness.   Musculoskeletal: She exhibits no edema.   Neurological: She is alert.   Skin: No erythema.   Psychiatric: She has a normal mood and affect.   Vitals reviewed.      Assessment:       1. Annual physical exam    2. Essential hypertension    3. Personal history of breast cancer    4. Vitamin D deficiency    5. Elevated triglycerides with high cholesterol        Plan:       Marti was seen today for annual exam.    Diagnoses and all orders for this visit:    Annual physical exam    Essential hypertension  History of hypertension we discussed risk benefits of medication her blood pressure has been low more recently she would like to try off medication since she is on such a low dose and bp has been low  she can stop lisinopril 10 mg and if she needs to resume medicine we discussed may consider alternate at that time continue exercise reinforced dietary measures home monitoring    Personal history of breast cancer  She has her mammogram and gyn appointment planned    Vitamin D deficiency  Discussed vitamin-D she will increase her vitamin-D over-the-counter and take regularly    osteoarthrits knees   Stable she follows with ortho uses celebrex on occasion     Elevated triglycerides with high cholesterol  History of she will continue dietary measures her most recent cholesterol has improved she has been exercising more watching her diet more consistently    Discussed shingrix vaccine when available, discussed pneumonia vaccines with pt     We discussed follow-up appointment to recheck her pressure off medication she will consider scheduling monitor at home but call if any elevations or concerns

## 2019-07-15 ENCOUNTER — OFFICE VISIT (OUTPATIENT)
Dept: OBSTETRICS AND GYNECOLOGY | Facility: CLINIC | Age: 64
End: 2019-07-15
Attending: OBSTETRICS & GYNECOLOGY
Payer: COMMERCIAL

## 2019-07-15 VITALS
WEIGHT: 172.81 LBS | DIASTOLIC BLOOD PRESSURE: 86 MMHG | BODY MASS INDEX: 30.62 KG/M2 | HEIGHT: 63 IN | SYSTOLIC BLOOD PRESSURE: 112 MMHG

## 2019-07-15 DIAGNOSIS — Z12.4 PAP SMEAR FOR CERVICAL CANCER SCREENING: ICD-10-CM

## 2019-07-15 DIAGNOSIS — Z12.31 SCREENING MAMMOGRAM, ENCOUNTER FOR: ICD-10-CM

## 2019-07-15 DIAGNOSIS — N76.0 VULVOVAGINITIS: ICD-10-CM

## 2019-07-15 DIAGNOSIS — Z01.419 ENCOUNTER FOR GYNECOLOGICAL EXAMINATION WITHOUT ABNORMAL FINDING: Primary | ICD-10-CM

## 2019-07-15 PROCEDURE — 99999 PR PBB SHADOW E&M-EST. PATIENT-LVL III: ICD-10-PCS | Mod: PBBFAC,,, | Performed by: OBSTETRICS & GYNECOLOGY

## 2019-07-15 PROCEDURE — 99396 PREV VISIT EST AGE 40-64: CPT | Mod: S$GLB,,, | Performed by: OBSTETRICS & GYNECOLOGY

## 2019-07-15 PROCEDURE — 99999 PR PBB SHADOW E&M-EST. PATIENT-LVL III: CPT | Mod: PBBFAC,,, | Performed by: OBSTETRICS & GYNECOLOGY

## 2019-07-15 PROCEDURE — 88175 CYTOPATH C/V AUTO FLUID REDO: CPT

## 2019-07-15 PROCEDURE — 3079F DIAST BP 80-89 MM HG: CPT | Mod: CPTII,S$GLB,, | Performed by: OBSTETRICS & GYNECOLOGY

## 2019-07-15 PROCEDURE — 87624 HPV HI-RISK TYP POOLED RSLT: CPT

## 2019-07-15 PROCEDURE — 3074F PR MOST RECENT SYSTOLIC BLOOD PRESSURE < 130 MM HG: ICD-10-PCS | Mod: CPTII,S$GLB,, | Performed by: OBSTETRICS & GYNECOLOGY

## 2019-07-15 PROCEDURE — 3079F PR MOST RECENT DIASTOLIC BLOOD PRESSURE 80-89 MM HG: ICD-10-PCS | Mod: CPTII,S$GLB,, | Performed by: OBSTETRICS & GYNECOLOGY

## 2019-07-15 PROCEDURE — 99396 PR PREVENTIVE VISIT,EST,40-64: ICD-10-PCS | Mod: S$GLB,,, | Performed by: OBSTETRICS & GYNECOLOGY

## 2019-07-15 PROCEDURE — 3074F SYST BP LT 130 MM HG: CPT | Mod: CPTII,S$GLB,, | Performed by: OBSTETRICS & GYNECOLOGY

## 2019-07-15 RX ORDER — CLOTRIMAZOLE AND BETAMETHASONE DIPROPIONATE 10; .64 MG/G; MG/G
CREAM TOPICAL 2 TIMES DAILY
Qty: 15 G | Refills: 6 | Status: SHIPPED | OUTPATIENT
Start: 2019-07-15 | End: 2020-07-16 | Stop reason: SDUPTHER

## 2019-07-15 NOTE — PROGRESS NOTES
Subjective:       Patient ID: Marti Coley is a 63 y.o. female.    Chief Complaint:  Well Woman      History of Present Illness  HPI    Marti Coley is a 63 y.o. female  here for her annual GYN exam.  SHe stopped using Intrarosa because she felt it was too messy.   She describes her periods as stopped with menopause in ,   denies break through bleeding.   denies vaginal itching or irritation.   Denies vaginal discharge.  She is sexually active. She has had 1 partner for 23 years .  Last Pap: approximate date  and was normal    History of abnormal pap: No    Last MMG: normal--routine follow-up in 12 months  Last Colonoscopy:  colonoscopy 8 years ago without abnormalities.  denies domestic violence. She does feel safe at home.     Past Medical History:   Diagnosis Date    Allergy     Breast cancer     left diagnosed in  - underwent chemotherapy and radiation after lumpectomy    Cancer 2008    left IDC, neoadjuvant chemo, 7mm residual IDC lumpectomy, negative Sn biopsy, adjuvant XRT and hormonal therapy with tamoxifen, ER/GA+, HER-2 negtive    Genetic testing 2008     negative MultiSite BRACAnalysis    HTN (hypertension)     Hyperlipidemia     Osteoarthritis      Past Surgical History:   Procedure Laterality Date    BREAST LUMPECTOMY Left     BREAST SURGERY  2008    left lumpectomy with negative SN biopsy    ENDOMETRIAL BIOPSY  11     Social History     Socioeconomic History    Marital status:      Spouse name: Not on file    Number of children: 0    Years of education: Not on file    Highest education level: Not on file   Occupational History    Occupation:      Employer: [employer]   Social Needs    Financial resource strain: Not on file    Food insecurity:     Worry: Not on file     Inability: Not on file    Transportation needs:     Medical: Not on file     Non-medical: Not on file   Tobacco Use     "Smoking status: Never Smoker    Smokeless tobacco: Never Used   Substance and Sexual Activity    Alcohol use: No     Alcohol/week: 0.5 oz     Types: 1 Standard drinks or equivalent per week     Comment: allergic    Drug use: No    Sexual activity: Yes     Partners: Male     Birth control/protection: Post-menopausal     Comment: teacher,   since , 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 Also walks dog.     Family History   Problem Relation Age of Onset    Breast cancer Mother 80    Hypertension Mother     Hyperlipidemia Mother     Breast cancer Sister 32        negative genetic testing 2017, multi gene panel     Cancer Sister         thyroid and uterine cancer, breast, follicular lymphoma     Endometrial cancer Sister     Uterine cancer Sister 55        endometrial cancer, diagnosed @ time of hysterectomy for fibroids    Breast cancer Paternal Grandmother 80    Heart disease Father     Cancer Father         bladder cancer     COPD Father     Aneurysm Father     Melanoma Brother     Heart disease Brother     Cancer Brother         melanoma     Ovarian cancer Neg Hx     Diabetes Neg Hx     Stroke Neg Hx      OB History        0    Para   0    Term   0       0    AB   0    Living   0       SAB   0    TAB   0    Ectopic   0    Multiple   0    Live Births   0                 /86 (BP Location: Right arm, Patient Position: Sitting)   Ht 5' 3" (1.6 m)   Wt 78.4 " kg (172 lb 13.5 oz)   LMP 2008 (Approximate) Comment: 3/2008  BMI 30.62 kg/m²         GYN & OB History  Patient's last menstrual period was 2008 (approximate).   Date of Last Pap: 2016    OB History    Para Term  AB Living   0 0 0 0 0 0   SAB TAB Ectopic Multiple Live Births   0 0 0 0 0       Review of Systems  Review of Systems   Constitutional: Negative for activity change, appetite change, fatigue and unexpected weight change.   HENT: Negative.    Eyes: Negative for visual disturbance.   Respiratory: Negative for shortness of breath and wheezing.    Cardiovascular: Negative for chest pain, palpitations and leg swelling.   Gastrointestinal: Negative for abdominal pain, bloating and blood in stool.   Endocrine: Negative for diabetes, hair loss and hot flashes.   Genitourinary: Positive for vaginal dryness. Negative for decreased libido, dyspareunia and postmenopausal bleeding.   Musculoskeletal: Negative for back pain and joint swelling.   Integumentary:  Negative for acne, hair changes and nipple discharge.   Neurological: Negative for headaches.   Hematological: Does not bruise/bleed easily.   Psychiatric/Behavioral: Negative for depression and sleep disturbance. The patient is not nervous/anxious.    Breast: Negative for mastodynia and nipple discharge          Objective:      Physical Exam:   Constitutional: She is oriented to person, place, and time. She appears well-developed and well-nourished.    HENT:   Head: Normocephalic and atraumatic.    Eyes: Pupils are equal, round, and reactive to light. EOM are normal.    Neck: Normal range of motion. Neck supple.    Cardiovascular: Normal rate and regular rhythm.     Pulmonary/Chest: Effort normal and breath sounds normal.   BREASTS:  no mass, no tenderness, no deformity and no retraction. Right breast exhibits no inverted nipple, no mass, no nipple discharge, no skin change, no tenderness, no bleeding and no swelling. Left breast  Scarred,  exhibits no inverted nipple, no mass, no nipple discharge, no skin change, no tenderness, no bleeding and no swelling. Breasts are symmetrical.              Abdominal: Soft. Bowel sounds are normal.     Genitourinary:       Pelvic exam was performed with patient supine.   Genitourinary Comments: PELVIC: Normal external genitalia without lesions.  Normal hair distribution. Erythema of Right labia minora. Adequate perineal body, normal urethral meatus.  Vagina  Dry and poorly rugated, atrophic, without lesions or discharge.  Cervix pink, without lesions, discharge or tenderness.  No significant cystocele or rectocele.  Bimanual exam shows uterus to be normal size, regular, mobile and nontender.  Adnexa without masses or tenderness.    RECTAL:Deferred             Musculoskeletal: Normal range of motion and moves all extremeties.       Neurological: She is alert and oriented to person, place, and time.    Skin: Skin is warm and dry.    Psychiatric: She has a normal mood and affect.              Assessment:        1. Encounter for gynecological examination without abnormal finding    2. Screening mammogram, encounter for    3. Pap smear for cervical cancer screening    4. Vulvovaginitis                Plan:        1. Encounter for gynecological examination without abnormal finding  COUNSELING:  The patient was counseled today on regular weight bearing exercise. Patient was counseled today on the new ACS guidelines for cervical cytology screening as well as the current recommendations for breast cancer screening. Counseling session lasted approximately 10 minutes, and all her questions were answered. She was advised to see her primary care physician for all other health maintenance.   FOLLOW-UP with me for next routine visit.         2. Screening mammogram, encounter for  Mammogram    3. Pap smear for cervical cancer screening    - Liquid-based pap smear, screening  - HPV High Risk Genotypes, PCR       Follow up  in about 1 year (around 7/15/2020).

## 2019-07-18 LAB
HPV HR 12 DNA CVX QL NAA+PROBE: NEGATIVE
HPV16 AG SPEC QL: NEGATIVE
HPV18 DNA SPEC QL NAA+PROBE: NEGATIVE

## 2019-08-02 ENCOUNTER — HOSPITAL ENCOUNTER (OUTPATIENT)
Dept: RADIOLOGY | Facility: HOSPITAL | Age: 64
Discharge: HOME | End: 2019-08-02
Attending: NURSE PRACTITIONER
Payer: COMMERCIAL

## 2019-08-02 DIAGNOSIS — Z85.3 PERSONAL HISTORY OF BREAST CANCER: ICD-10-CM

## 2019-08-02 PROCEDURE — 77063 MAMMO DIGITAL SCREENING BILAT WITH TOMOSYNTHESIS_CAD: ICD-10-PCS | Mod: 26,,, | Performed by: RADIOLOGY

## 2019-08-02 PROCEDURE — 77067 SCR MAMMO BI INCL CAD: CPT | Mod: 26,,, | Performed by: RADIOLOGY

## 2019-08-02 PROCEDURE — 77063 BREAST TOMOSYNTHESIS BI: CPT | Mod: 26,,, | Performed by: RADIOLOGY

## 2019-08-02 PROCEDURE — 77067 MAMMO DIGITAL SCREENING BILAT WITH TOMOSYNTHESIS_CAD: ICD-10-PCS | Mod: 26,,, | Performed by: RADIOLOGY

## 2019-08-02 PROCEDURE — 77067 SCR MAMMO BI INCL CAD: CPT | Mod: TC

## 2019-08-27 ENCOUNTER — TELEPHONE (OUTPATIENT)
Dept: SURGERY | Facility: CLINIC | Age: 64
End: 2019-08-27

## 2019-08-27 NOTE — TELEPHONE ENCOUNTER
----- Message from Henrietta Stewart sent at 8/27/2019 11:51 AM CDT -----  Contact: Self  Pt stated that she is having an issue with her billing and needs the provider she was was seeing Ángela KELLOGG who is no longer here to re code a test so she can be billed correctly, she said billing was unable to help her.    Contact pt @ [phone] or [phone]

## 2019-08-27 NOTE — TELEPHONE ENCOUNTER
Returned the patient call regarding the message below.  Stated to the patient that I will have my manager Asha Sinclair RN look into the matter and contact her back.  The patient voiced understanding of this information.

## 2019-08-28 ENCOUNTER — TELEPHONE (OUTPATIENT)
Dept: SURGERY | Facility: CLINIC | Age: 64
End: 2019-08-28

## 2019-08-28 NOTE — TELEPHONE ENCOUNTER
----- Message from Hoda Chen sent at 8/28/2019  4:47 PM CDT -----  Contact: pt  The pt returned a missed call to darrin regarding an incorrect code for testing.    [phone]

## 2019-08-28 NOTE — TELEPHONE ENCOUNTER
Left VM with my direct contact information, patient advised to give a return call with any questions or concerns regarding mmg bill and re-submission of improperly processed claim

## 2019-08-28 NOTE — TELEPHONE ENCOUNTER
Spoke with patient regarding mmg bill issues, advised patient that I spoke to her insurer directly and claim was submitted incorrectly, claim was re-submitted correctly as annual screening mammogram and would not be submitted towards her deductible, advised patient that coding for mmg was correct for an annual screening mmg, advised patient to F/U with insurer directly with any additional questions and [employer] would send her the updated claim information directly, pt verbalized understanding

## 2019-08-28 NOTE — TELEPHONE ENCOUNTER
----- Message from Vladimir Ortiz sent at 8/28/2019 11:57 AM CDT -----  Contact: Patient @ [phone] until 3pm after [phone]  Patient requesting a return call from Namrata for an update, [facility] call

## 2019-08-28 NOTE — TELEPHONE ENCOUNTER
Spoke with Shantal from [facility] regarding patients mmg bill, patient's annual screening mmg was processed against deductible in error, claim resubmitted appropriately at this time and patient will be sent the correct information after re-submission of claim for her annual bilateral screening mmg, the ICD-10 codes for the mmg were correct and do not need any changes at this time

## 2019-08-28 NOTE — TELEPHONE ENCOUNTER
----- Message from Namrata Solomon MA sent at 8/28/2019 12:27 PM CDT -----  Contact: Patient @ [phone] until 3pm after [phone]  Patient is wanting a return regarding mammogram bill she received.    Namrata  ----- Message -----  From: Vladimir Ortiz  Sent: 8/28/2019  11:57 AM  To: Namrata Solomon MA, #    Patient requesting a return call from Namrata for an update, pls call

## 2019-09-02 ENCOUNTER — PATIENT MESSAGE (OUTPATIENT)
Dept: INTERNAL MEDICINE | Facility: CLINIC | Age: 64
End: 2019-09-02

## 2019-09-03 ENCOUNTER — TELEPHONE (OUTPATIENT)
Dept: INTERNAL MEDICINE | Facility: CLINIC | Age: 64
End: 2019-09-03

## 2019-09-03 DIAGNOSIS — I10 ESSENTIAL HYPERTENSION: Primary | ICD-10-CM

## 2019-09-03 NOTE — TELEPHONE ENCOUNTER
----- Message from Eddi Gayle sent at 9/3/2019  3:08 PM CDT -----  Contact: Patient   Patient is returning a call from the office     7am-3pm [phone] and  After 3pm :[phone]

## 2019-09-05 RX ORDER — AMLODIPINE BESYLATE 5 MG/1
5 TABLET ORAL DAILY
Qty: 90 TABLET | Refills: 3 | Status: SHIPPED | OUTPATIENT
Start: 2019-09-05 | End: 2020-07-09 | Stop reason: SDUPTHER

## 2019-09-05 NOTE — TELEPHONE ENCOUNTER
Called and reviewed with pt  Discussed treatment options  Risk benefits losartan or amlodipine  She would like trial amlodipine start 5 mg   Risk benefits reviewed  She will monitor call with highs or lows

## 2019-10-08 ENCOUNTER — PATIENT MESSAGE (OUTPATIENT)
Dept: ORTHOPEDICS | Facility: CLINIC | Age: 64
End: 2019-10-08

## 2019-10-16 ENCOUNTER — PATIENT OUTREACH (OUTPATIENT)
Dept: ADMINISTRATIVE | Facility: OTHER | Age: 64
End: 2019-10-16

## 2019-10-18 ENCOUNTER — OFFICE VISIT (OUTPATIENT)
Dept: ORTHOPEDICS | Facility: CLINIC | Age: 64
End: 2019-10-18
Payer: COMMERCIAL

## 2019-10-18 VITALS
DIASTOLIC BLOOD PRESSURE: 84 MMHG | SYSTOLIC BLOOD PRESSURE: 126 MMHG | HEIGHT: 63 IN | WEIGHT: 178.25 LBS | BODY MASS INDEX: 31.58 KG/M2 | HEART RATE: 82 BPM

## 2019-10-18 DIAGNOSIS — M17.11 PRIMARY OSTEOARTHRITIS OF RIGHT KNEE: Primary | ICD-10-CM

## 2019-10-18 PROCEDURE — 3008F PR BODY MASS INDEX (BMI) DOCUMENTED: ICD-10-PCS | Mod: CPTII,S$GLB,, | Performed by: PHYSICIAN ASSISTANT

## 2019-10-18 PROCEDURE — 3074F PR MOST RECENT SYSTOLIC BLOOD PRESSURE < 130 MM HG: ICD-10-PCS | Mod: CPTII,S$GLB,, | Performed by: PHYSICIAN ASSISTANT

## 2019-10-18 PROCEDURE — 20610 LARGE JOINT ASPIRATION/INJECTION: R KNEE: ICD-10-PCS | Mod: RT,S$GLB,, | Performed by: PHYSICIAN ASSISTANT

## 2019-10-18 PROCEDURE — 3079F DIAST BP 80-89 MM HG: CPT | Mod: CPTII,S$GLB,, | Performed by: PHYSICIAN ASSISTANT

## 2019-10-18 PROCEDURE — 3008F BODY MASS INDEX DOCD: CPT | Mod: CPTII,S$GLB,, | Performed by: PHYSICIAN ASSISTANT

## 2019-10-18 PROCEDURE — 99213 PR OFFICE/OUTPT VISIT, EST, LEVL III, 20-29 MIN: ICD-10-PCS | Mod: 25,S$GLB,, | Performed by: PHYSICIAN ASSISTANT

## 2019-10-18 PROCEDURE — 3079F PR MOST RECENT DIASTOLIC BLOOD PRESSURE 80-89 MM HG: ICD-10-PCS | Mod: CPTII,S$GLB,, | Performed by: PHYSICIAN ASSISTANT

## 2019-10-18 PROCEDURE — 99213 OFFICE O/P EST LOW 20 MIN: CPT | Mod: 25,S$GLB,, | Performed by: PHYSICIAN ASSISTANT

## 2019-10-18 PROCEDURE — 99999 PR PBB SHADOW E&M-EST. PATIENT-LVL III: CPT | Mod: PBBFAC,,, | Performed by: PHYSICIAN ASSISTANT

## 2019-10-18 PROCEDURE — 20610 DRAIN/INJ JOINT/BURSA W/O US: CPT | Mod: RT,S$GLB,, | Performed by: PHYSICIAN ASSISTANT

## 2019-10-18 PROCEDURE — 3074F SYST BP LT 130 MM HG: CPT | Mod: CPTII,S$GLB,, | Performed by: PHYSICIAN ASSISTANT

## 2019-10-18 PROCEDURE — 99999 PR PBB SHADOW E&M-EST. PATIENT-LVL III: ICD-10-PCS | Mod: PBBFAC,,, | Performed by: PHYSICIAN ASSISTANT

## 2019-10-18 RX ORDER — TRIAMCINOLONE ACETONIDE 40 MG/ML
40 INJECTION, SUSPENSION INTRA-ARTICULAR; INTRAMUSCULAR
Status: DISCONTINUED | OUTPATIENT
Start: 2019-10-18 | End: 2019-10-18 | Stop reason: HOSPADM

## 2019-10-18 RX ADMIN — TRIAMCINOLONE ACETONIDE 40 MG: 40 INJECTION, SUSPENSION INTRA-ARTICULAR; INTRAMUSCULAR at 04:10

## 2019-10-18 NOTE — PROCEDURES
Large Joint Aspiration/Injection: R knee  Date/Time: 10/18/2019 4:00 PM  Performed by: Analy Bates PA-C  Authorized by: Analy Bates PA-C     Consent Done?:  Yes (Verbal)  Indications:  Pain  Anesthesia    Anesthetic: topical anesthetic    Location:  Knee  Site:  R knee  Prep: Patient was prepped and draped in usual sterile fashion    Needle size:  22 G  Approach:  Anterolateral  Medications:  40 mg triamcinolone acetonide 40 mg/mL  Patient tolerance:  Patient tolerated the procedure well with no immediate complications

## 2019-10-18 NOTE — PROGRESS NOTES
SUBJECTIVE:     Chief Complaint   Patient presents with    Right Knee - Pain       History of Present Illness:  Marti Coley is a 63 y.o. year old female here with a history of intermittent right knee pain.  She has seen Marilu Edwards in the past for her bilateral knee pain however her left knee is currently not bothering her. There is not a history of trauma.  The pain is located in the anterior aspect of the knee.  The pain is described as achy, 3/10.  There is not radiation.  There is not catching or locking.  Aggravating factors include going up and down stairs and increased activity.  She has had good relief with steroid injections in the past and she is requesting one today.  She takes celebrex prn.  The patient does not use an assistive device.    Review of patient's allergies indicates:   Allergen Reactions    Ciprofloxacin Rash    Penicillins Rash         Current Outpatient Medications   Medication Sig Dispense Refill    amLODIPine (NORVASC) 5 MG tablet Take 1 tablet (5 mg total) by mouth once daily. 90 tablet 3    celecoxib (CELEBREX) 200 MG capsule Take 1 capsule (200 mg total) by mouth 2 (two) times daily. 90 capsule 1    clotrimazole-betamethasone 1-0.05% (LOTRISONE) cream Apply topically 2 (two) times daily. Apply to affected area 15 g 6    diclofenac sodium (PENNSAID) 20 mg/gram /actuation(2 %) sopm Apply 40 mg topically 2 (two) times daily as needed. 112 g 2    clobetasol 0.05% (TEMOVATE) 0.05 % Oint Apply topically 2 (two) times daily. for 10 days 30 g 6    fish oil-omega-3 fatty acids 300-1,000 mg capsule Take 1 g by mouth once daily.      mupirocin (BACTROBAN) 2 % ointment Apply topically 3 (three) times daily. 30 g 0    prasterone, dhea, (INTRAROSA) 6.5 mg Inst Place 6.5 mg vaginally every evening. 30 each 12     No current facility-administered medications for this visit.        Past Medical History:   Diagnosis Date    Allergy     Breast cancer 2008    left diagnosed in  "2008 - underwent chemotherapy and radiation after lumpectomy    Cancer 4/2008    left IDC, neoadjuvant chemo, 7mm residual IDC lumpectomy, negative Sn biopsy, adjuvant XRT and hormonal therapy with tamoxifen, ER/UT+, HER-2 negtive    Genetic testing 4/28/2008     negative MultiSite BRACAnalysis    HTN (hypertension)     Hyperlipidemia     Osteoarthritis        Past Surgical History:   Procedure Laterality Date    BREAST LUMPECTOMY Left 2008    BREAST SURGERY  12/2008    left lumpectomy with negative SN biopsy    ENDOMETRIAL BIOPSY  5/27/11       Vital Signs (Most Recent)  Vitals:    10/18/19 1608   BP: 126/84   Pulse: 82           Review of Systems:  ROS:  Constitutional: no fever or chills  Eyes: no visual changes  ENT: no nasal congestion or sore throat  Respiratory: no cough or shortness of breath  Cardiovascular: no chest pain or palpitations  Gastrointestinal: no nausea or vomiting, tolerating diet  Genitourinary: no hematuria or dysuria  Integument/Breast: no rash or pruritis  Hematologic/Lymphatic: no easy bruising or lymphadenopathy  Musculoskeletal: no arthralgias or myalgias  Neurological: no seizures or tremors  Behavioral/Psych: no auditory or visual hallucinations  Endocrine: no heat or cold intolerance                OBJECTIVE:     PHYSICAL EXAM:  Height: 5' 3" (160 cm) Weight: 80.8 kg (178 lb 3.9 oz)   General: Well developed, well nourished, in no acute distress.  Neurological: Mood & affect are normal.  HEENT: NCAT, sclera nonicteric   Lungs: Respirations are equal and unlabored.   CV: 2+ bilateral upper and lower extremity pulses.   Skin: Intact throughout with no rashes, erythema, or lesions  Extremities: No LE edema, no erythema or warmth of the skin in either lower extremity.    right  Knee Exam:  Knee Range of Motion: 0-130   Effusion: none  Condition of skin: intact, no erythema  Location of tenderness:None   Strength:5 of 5 quadriceps strength and 5 of 5 hamstring " strength  Stability:  stable to testing    Hip Examination:  full painless range of motion, without tenderness    IMAGING:    No new x-rays.  Previous x-rays show mild DJD, most significant patellofemoral joint space    ASSESSMENT/PLAN:   63 y.o. year old female with right knee osteoarthritis    Plan: We discussed with the patient at length all the different treatment options available for the knee  - She was given right knee CSI today.  Recommend rest, ice activity modification  - Follow up as needed

## 2020-01-17 ENCOUNTER — OFFICE VISIT (OUTPATIENT)
Dept: ORTHOPEDICS | Facility: CLINIC | Age: 65
End: 2020-01-17
Payer: COMMERCIAL

## 2020-01-17 ENCOUNTER — HOSPITAL ENCOUNTER (OUTPATIENT)
Dept: RADIOLOGY | Facility: HOSPITAL | Age: 65
Discharge: HOME | End: 2020-01-17
Attending: NURSE PRACTITIONER
Payer: COMMERCIAL

## 2020-01-17 VITALS
HEIGHT: 63 IN | SYSTOLIC BLOOD PRESSURE: 130 MMHG | HEART RATE: 79 BPM | DIASTOLIC BLOOD PRESSURE: 84 MMHG | BODY MASS INDEX: 31.56 KG/M2 | WEIGHT: 178.13 LBS

## 2020-01-17 DIAGNOSIS — M17.12 PRIMARY OSTEOARTHRITIS OF LEFT KNEE: ICD-10-CM

## 2020-01-17 DIAGNOSIS — M25.561 PAIN IN BOTH KNEES, UNSPECIFIED CHRONICITY: Primary | ICD-10-CM

## 2020-01-17 DIAGNOSIS — M25.562 PAIN IN BOTH KNEES, UNSPECIFIED CHRONICITY: Primary | ICD-10-CM

## 2020-01-17 DIAGNOSIS — M25.562 PAIN IN BOTH KNEES, UNSPECIFIED CHRONICITY: ICD-10-CM

## 2020-01-17 DIAGNOSIS — M25.561 PAIN IN BOTH KNEES, UNSPECIFIED CHRONICITY: ICD-10-CM

## 2020-01-17 PROCEDURE — 99213 PR OFFICE/OUTPT VISIT, EST, LEVL III, 20-29 MIN: ICD-10-PCS | Mod: 25,S$GLB,, | Performed by: NURSE PRACTITIONER

## 2020-01-17 PROCEDURE — 73564 X-RAY EXAM KNEE 4 OR MORE: CPT | Mod: 26,,, | Performed by: RADIOLOGY

## 2020-01-17 PROCEDURE — 3008F PR BODY MASS INDEX (BMI) DOCUMENTED: ICD-10-PCS | Mod: CPTII,S$GLB,, | Performed by: NURSE PRACTITIONER

## 2020-01-17 PROCEDURE — 3075F SYST BP GE 130 - 139MM HG: CPT | Mod: CPTII,S$GLB,, | Performed by: NURSE PRACTITIONER

## 2020-01-17 PROCEDURE — 3079F PR MOST RECENT DIASTOLIC BLOOD PRESSURE 80-89 MM HG: ICD-10-PCS | Mod: CPTII,S$GLB,, | Performed by: NURSE PRACTITIONER

## 2020-01-17 PROCEDURE — 73564 XR KNEE ORTHO BILAT WITH FLEXION: ICD-10-PCS | Mod: 26,,, | Performed by: RADIOLOGY

## 2020-01-17 PROCEDURE — 99213 OFFICE O/P EST LOW 20 MIN: CPT | Mod: 25,S$GLB,, | Performed by: NURSE PRACTITIONER

## 2020-01-17 PROCEDURE — 20610 PR DRAIN/INJECT LARGE JOINT/BURSA: ICD-10-PCS | Mod: LT,S$GLB,, | Performed by: NURSE PRACTITIONER

## 2020-01-17 PROCEDURE — 3079F DIAST BP 80-89 MM HG: CPT | Mod: CPTII,S$GLB,, | Performed by: NURSE PRACTITIONER

## 2020-01-17 PROCEDURE — 3075F PR MOST RECENT SYSTOLIC BLOOD PRESS GE 130-139MM HG: ICD-10-PCS | Mod: CPTII,S$GLB,, | Performed by: NURSE PRACTITIONER

## 2020-01-17 PROCEDURE — 73564 X-RAY EXAM KNEE 4 OR MORE: CPT | Mod: TC,50

## 2020-01-17 PROCEDURE — 3008F BODY MASS INDEX DOCD: CPT | Mod: CPTII,S$GLB,, | Performed by: NURSE PRACTITIONER

## 2020-01-17 PROCEDURE — 99999 PR PBB SHADOW E&M-EST. PATIENT-LVL III: CPT | Mod: PBBFAC,,, | Performed by: NURSE PRACTITIONER

## 2020-01-17 PROCEDURE — 99999 PR PBB SHADOW E&M-EST. PATIENT-LVL III: ICD-10-PCS | Mod: PBBFAC,,, | Performed by: NURSE PRACTITIONER

## 2020-01-17 PROCEDURE — 20610 DRAIN/INJ JOINT/BURSA W/O US: CPT | Mod: LT,S$GLB,, | Performed by: NURSE PRACTITIONER

## 2020-01-17 RX ADMIN — TRIAMCINOLONE ACETONIDE 40 MG: 40 INJECTION, SUSPENSION INTRA-ARTICULAR; INTRAMUSCULAR at 03:01

## 2020-01-19 RX ORDER — TRIAMCINOLONE ACETONIDE 40 MG/ML
40 INJECTION, SUSPENSION INTRA-ARTICULAR; INTRAMUSCULAR
Status: COMPLETED | OUTPATIENT
Start: 2020-01-17 | End: 2020-01-17

## 2020-01-19 NOTE — PROGRESS NOTES
CC: left knee pain      HPI: Pt with c/o left knee pain for the past several weeks. The pain is worse with increased activity and she has to climb steps at work and at home which aggravates the pain. The pain is located to the global knee. It is an aching pain. She has been seen in the past and has known djd of the knee. She has had cortisone injections with good relief and she would like to repeat that today. Her last injection was in October by Coni Bates. She uses Celebrex very sparingly as needed with good results. She is ambulating without assistive device. There is not a limp..    ROS  General: denies fever and chills  Resp: no c/o sob  CVS: no c/o cp  MSK: c/o left knee pain which is aching and global and worse with increased activity    PE  General: AAOx3, pleasant and cooperative  Resp: respirations even and unlabored  MSK: left knee exam  0 degrees extension  120 degrees flexion  No warmth or erythema   - effusion  5/5 quad strength  + mild crepitus  + tenderness over the medial joint space    Xray:  Reviewed by me with the patient: There is mild valgus angulation on AP standing view in neutral position.    There is mild tricompartmental osteoarthrosis in each knee.  Right patella shows lateral subluxation on axial projection.  There is mild spurring of intra-articular eminences, more pronounced on the right.    I detect no fracture, dislocation, radiopaque retained foreign body, lytic or blastic lesion, erosion or chondrocalcinosis    Assessment:  Left knee djd    Plan:  Cortisone injection left knee today  RICE  celebrex prn- use sparingly  F/u as needed    Knee Injection Procedure Note  Diagnosis: left knee degenerative arthritis  Indications: left knee pain  Procedure Details: Verbal consent was obtained for the procedure. The injection site was identified and the skin was prepared with alcohol. The left knee was injected from an anterolateral approach with 1 ml of Kenalog and 4 ml Lidocaine under  sterile technique using a 22 gauge needle. The needle was removed and the area cleansed and dressed.  Complications:  Patient tolerated the procedure well.    she was advised to rest the knee today, using ice and elevation as needed for comfort and swelling.Immediate relief of the knee pain may be short lived and secondary to the lidocaine. she may have an increase in discomfort tonight followed by steady improvement over the next several days. It may take 1-2 weeks following the injection to get the full benefit of the medication.

## 2020-07-09 ENCOUNTER — OFFICE VISIT (OUTPATIENT)
Dept: INTERNAL MEDICINE | Facility: CLINIC | Age: 65
End: 2020-07-09
Payer: COMMERCIAL

## 2020-07-09 VITALS
BODY MASS INDEX: 31.21 KG/M2 | WEIGHT: 176.13 LBS | OXYGEN SATURATION: 97 % | HEIGHT: 63 IN | DIASTOLIC BLOOD PRESSURE: 72 MMHG | SYSTOLIC BLOOD PRESSURE: 122 MMHG | HEART RATE: 87 BPM

## 2020-07-09 DIAGNOSIS — M19.90 OSTEOARTHRITIS, UNSPECIFIED OSTEOARTHRITIS TYPE, UNSPECIFIED SITE: ICD-10-CM

## 2020-07-09 DIAGNOSIS — Z00.00 ANNUAL PHYSICAL EXAM: Primary | ICD-10-CM

## 2020-07-09 DIAGNOSIS — I10 ESSENTIAL HYPERTENSION: ICD-10-CM

## 2020-07-09 DIAGNOSIS — E55.9 VITAMIN D DEFICIENCY: ICD-10-CM

## 2020-07-09 DIAGNOSIS — E78.2 ELEVATED TRIGLYCERIDES WITH HIGH CHOLESTEROL: ICD-10-CM

## 2020-07-09 DIAGNOSIS — Z85.3 PERSONAL HISTORY OF BREAST CANCER: ICD-10-CM

## 2020-07-09 PROCEDURE — 3078F DIAST BP <80 MM HG: CPT | Mod: CPTII,S$GLB,, | Performed by: INTERNAL MEDICINE

## 2020-07-09 PROCEDURE — 3074F PR MOST RECENT SYSTOLIC BLOOD PRESSURE < 130 MM HG: ICD-10-PCS | Mod: CPTII,S$GLB,, | Performed by: INTERNAL MEDICINE

## 2020-07-09 PROCEDURE — 99396 PR PREVENTIVE VISIT,EST,40-64: ICD-10-PCS | Mod: S$GLB,,, | Performed by: INTERNAL MEDICINE

## 2020-07-09 PROCEDURE — 3074F SYST BP LT 130 MM HG: CPT | Mod: CPTII,S$GLB,, | Performed by: INTERNAL MEDICINE

## 2020-07-09 PROCEDURE — 99999 PR PBB SHADOW E&M-EST. PATIENT-LVL IV: ICD-10-PCS | Mod: PBBFAC,,, | Performed by: INTERNAL MEDICINE

## 2020-07-09 PROCEDURE — 99396 PREV VISIT EST AGE 40-64: CPT | Mod: S$GLB,,, | Performed by: INTERNAL MEDICINE

## 2020-07-09 PROCEDURE — 99999 PR PBB SHADOW E&M-EST. PATIENT-LVL IV: CPT | Mod: PBBFAC,,, | Performed by: INTERNAL MEDICINE

## 2020-07-09 PROCEDURE — 3078F PR MOST RECENT DIASTOLIC BLOOD PRESSURE < 80 MM HG: ICD-10-PCS | Mod: CPTII,S$GLB,, | Performed by: INTERNAL MEDICINE

## 2020-07-09 RX ORDER — AMLODIPINE BESYLATE 5 MG/1
5 TABLET ORAL DAILY
Qty: 90 TABLET | Refills: 3 | Status: SHIPPED | OUTPATIENT
Start: 2020-07-09 | End: 2021-07-12 | Stop reason: SDUPTHER

## 2020-07-09 NOTE — PROGRESS NOTES
"Subjective:       Patient ID: Marti Coley is a 64 y.o. female.    Chief Complaint: Annual Exam   This is a 64-year-old who presents today for checkup she reports that in general she has been doing well and her blood pressure has been well controlled she switched to amlodipine due to her concerns about losartan and feels that she does tolerated okay blood pressure looks great today and home number sometimes in the 130s.  She has tried to do some exercise walking most the time which she enjoys to try to keep her weight down it is a bit up from last visit .  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  She has had issues with thinning of her hair she relates to prior chemotherapy for her breast cancer she [facility] and has had occasional biopsy.  No cancers.  She follows with her gynecologist regularly and plans to make a follow-up she is up-to-date on her mammogram as well.  [facility] and is here to review her test results.  Since last visit she has stopped taking fish oil she started taking the same vitamin-D that her  is although she does not know the dosing of her vitamin-D improved.  She has not been doing as well with her diet but plans to get back to it she does have some trouble with arthritis in her knee sees orthopedist and will take an occasional anti-inflammatory or topical when needed but not on a daily his    HPI  Review of Systems   HENT:        Hair thinning stable     Respiratory: Negative for shortness of breath.    Cardiovascular: Negative for chest pain and palpitations.   Musculoskeletal: Negative for neck pain.        Athirtis knee at times    Neurological: Negative for headaches.       Objective:     Blood pressure 122/72, pulse 87, height 5' 3" (1.6 m), weight 79.9 kg (176 lb 2.4 oz), last menstrual period 03/01/2008, SpO2 97 %.    Physical " Exam  Constitutional:       General: She is not in acute distress.     Comments: Hair thinning  No dermatitis    HENT:      Head: Normocephalic.   Eyes:      General: No scleral icterus.  Neck:      Musculoskeletal: Neck supple.   Cardiovascular:      Rate and Rhythm: Normal rate and regular rhythm.      Heart sounds: Normal heart sounds. No murmur. No friction rub. No gallop.       Comments: Surgical scar left breast   Pulmonary:      Effort: Pulmonary effort is normal. No respiratory distress.      Breath sounds: Normal breath sounds.   Abdominal:      General: Bowel sounds are normal.      Palpations: Abdomen is soft. There is no mass.      Tenderness: There is no abdominal tenderness.   Musculoskeletal:      Comments: Crepitus knee    Skin:     Findings: No erythema.      Comments: No rashes    Neurological:      Mental Status: She is alert.         Assessment:       1. Annual physical exam    2. Essential hypertension    3. Personal history of breast cancer    4. Osteoarthritis, unspecified osteoarthritis type, unspecified site    5. Vitamin D deficiency    6. Elevated triglycerides with high cholesterol        Plan:       Marti was seen today for annual exam.    Diagnoses and all orders for this visit:    Annual physical exam    Essential hypertension  Blood pressure has been controlled continue current regimen she is tolerating switched to amlodipine without difficulty  Refill provided  -     amLODIPine (NORVASC) 5 MG tablet; Take 1 tablet (5 mg total) by mouth once daily.    Personal history of breast cancer  She is up-to-date on her mammograms    Osteoarthritis, unspecified osteoarthritis type, unspecified site  staable she follows with ortho uses anti inflammatory medicaations but not daily     Vitamin D deficiency  History of her vitamin-D is within normal range she will continue her current dose she can call or upload in the system her present regimen    Elevated triglycerides with high  cholesterol  History of she has stopped her fish oil   We discussed her his cholesterol and risks benefits of statins discussed he will consider if she would like to start she will call    History of hair thinning patient relates to prior chemotherapy she is planning on undergoing evaluation with outlying specialist declined Dermatology referral at this time but does seen outlying dermatologist currently    Discussed vaccines including prevnar 13/shingrix     Labs reviewed, insurance form completed    Follow up annually sooner if concern     Colonoscopy due next year discussed     .r    Answers for HPI/ROS submitted by the patient on 7/7/2020   Hypertension  Chronicity: recurrent  Onset: more than 1 year ago  Progression since onset: unchanged  Condition status: controlled  anxiety: No  blurred vision: No  chest pain: No  headaches: No  malaise/fatigue: No  neck pain: No  orthopnea: No  palpitations: No  peripheral edema: No  PND: No  shortness of breath: No  sweats: No  Agents associated with hypertension: no associated agents  CAD risks: family history, obesity, post-menopausal state  Compliance problems: no compliance problems  Improvement on treatment: mild

## 2020-07-15 ENCOUNTER — PATIENT OUTREACH (OUTPATIENT)
Dept: ADMINISTRATIVE | Facility: OTHER | Age: 65
End: 2020-07-15

## 2020-07-15 NOTE — PROGRESS NOTES
Care Everywhere: updated  Immunization: updated  Health Maintenance: updated  Media Review:   Legacy Review:   Order placed:   Upcoming appts:mammogram 8.3.2020

## 2020-07-16 ENCOUNTER — OFFICE VISIT (OUTPATIENT)
Dept: OBSTETRICS AND GYNECOLOGY | Facility: CLINIC | Age: 65
End: 2020-07-16
Attending: OBSTETRICS & GYNECOLOGY
Payer: COMMERCIAL

## 2020-07-16 VITALS
DIASTOLIC BLOOD PRESSURE: 68 MMHG | WEIGHT: 175.94 LBS | SYSTOLIC BLOOD PRESSURE: 112 MMHG | BODY MASS INDEX: 31.17 KG/M2 | HEIGHT: 63 IN

## 2020-07-16 DIAGNOSIS — N76.0 VULVOVAGINITIS: ICD-10-CM

## 2020-07-16 DIAGNOSIS — Z12.31 ENCOUNTER FOR SCREENING MAMMOGRAM FOR HIGH-RISK PATIENT: ICD-10-CM

## 2020-07-16 DIAGNOSIS — Z01.419 ENCOUNTER FOR GYNECOLOGICAL EXAMINATION WITHOUT ABNORMAL FINDING: Primary | ICD-10-CM

## 2020-07-16 PROCEDURE — 99999 PR PBB SHADOW E&M-EST. PATIENT-LVL III: CPT | Mod: PBBFAC,,, | Performed by: OBSTETRICS & GYNECOLOGY

## 2020-07-16 PROCEDURE — 99396 PREV VISIT EST AGE 40-64: CPT | Mod: S$GLB,,, | Performed by: OBSTETRICS & GYNECOLOGY

## 2020-07-16 PROCEDURE — 3074F PR MOST RECENT SYSTOLIC BLOOD PRESSURE < 130 MM HG: ICD-10-PCS | Mod: CPTII,S$GLB,, | Performed by: OBSTETRICS & GYNECOLOGY

## 2020-07-16 PROCEDURE — 3074F SYST BP LT 130 MM HG: CPT | Mod: CPTII,S$GLB,, | Performed by: OBSTETRICS & GYNECOLOGY

## 2020-07-16 PROCEDURE — 3078F DIAST BP <80 MM HG: CPT | Mod: CPTII,S$GLB,, | Performed by: OBSTETRICS & GYNECOLOGY

## 2020-07-16 PROCEDURE — 99999 PR PBB SHADOW E&M-EST. PATIENT-LVL III: ICD-10-PCS | Mod: PBBFAC,,, | Performed by: OBSTETRICS & GYNECOLOGY

## 2020-07-16 PROCEDURE — 3078F PR MOST RECENT DIASTOLIC BLOOD PRESSURE < 80 MM HG: ICD-10-PCS | Mod: CPTII,S$GLB,, | Performed by: OBSTETRICS & GYNECOLOGY

## 2020-07-16 PROCEDURE — 3008F PR BODY MASS INDEX (BMI) DOCUMENTED: ICD-10-PCS | Mod: CPTII,S$GLB,, | Performed by: OBSTETRICS & GYNECOLOGY

## 2020-07-16 PROCEDURE — 3008F BODY MASS INDEX DOCD: CPT | Mod: CPTII,S$GLB,, | Performed by: OBSTETRICS & GYNECOLOGY

## 2020-07-16 PROCEDURE — 99396 PR PREVENTIVE VISIT,EST,40-64: ICD-10-PCS | Mod: S$GLB,,, | Performed by: OBSTETRICS & GYNECOLOGY

## 2020-07-16 RX ORDER — CLOTRIMAZOLE AND BETAMETHASONE DIPROPIONATE 10; .64 MG/G; MG/G
CREAM TOPICAL 2 TIMES DAILY
Qty: 15 G | Refills: 6
Start: 2020-07-16 | End: 2021-07-30 | Stop reason: SDUPTHER

## 2020-07-16 RX ORDER — AMOXICILLIN 500 MG
1 CAPSULE ORAL DAILY
COMMUNITY

## 2020-07-16 NOTE — PROGRESS NOTES
Subjective:       Patient ID: Marti Coley is a 64 y.o. female.    Chief Complaint:  Annual Exam      History of Present Illness  HPI    Marti Coley is a 64 y.o. female  here for her annual GYN exam.  She has never had regrowth of her hair which has remained thin since her chemotherapy.  She describes her periods as stopped in ,   denies break through bleeding.   Reports vaginal itching or irritation.  Denies vaginal discharge.  She is sexually active. She has had 1 partner for 24 years .(     History of abnormal pap: No  Last Pap: approximate date 2019 and was normal  Last MMG: normal--routine follow-up in 12 months  Last Colonoscopy:  colonoscopy 9 years ago without abnormalities.  denies domestic violence. She does feel safe at home.     Past Medical History:   Diagnosis Date    Allergy     Breast cancer     left diagnosed in  - underwent chemotherapy and radiation after lumpectomy    Cancer 2008    left IDC, neoadjuvant chemo, 7mm residual IDC lumpectomy, negative Sn biopsy, adjuvant XRT and hormonal therapy with tamoxifen, ER/OK+, HER-2 negtive    Genetic testing 2008     negative MultiSite BRACAnalysis    HTN (hypertension)     Hyperlipidemia     Invasive lobular carcinoma of breast, stage 1 2012    Osteoarthritis      Past Surgical History:   Procedure Laterality Date    BREAST LUMPECTOMY Left     BREAST SURGERY  2008    left lumpectomy with negative SN biopsy    ENDOMETRIAL BIOPSY  11     Social History     Socioeconomic History    Marital status:      Spouse name: Not on file    Number of children: 0    Years of education: Not on file    Highest education level: Not on file   Occupational History    Occupation:      Employer: [employer]   Social Needs    Financial resource strain: Not hard at all    Food insecurity     Worry: Never true     Inability: Never true    Transportation needs      Medical: No     Non-medical: No   Tobacco Use    Smoking status: Never Smoker    Smokeless tobacco: Never Used   Substance and Sexual Activity    Alcohol use: No     Alcohol/week: 0.8 standard drinks     Types: 1 Standard drinks or equivalent per week     Frequency: Never     Binge frequency: Never     Comment: allergic    Drug use: No    Sexual activity: Yes     Partners: Male     Birth control/protection: Post-menopausal     Comment: teacher,   since , 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 Also walks dog.     Family History   Problem Relation Age of Onset    Breast cancer Mother 80    Hypertension Mother     Hyperlipidemia Mother     Breast cancer Sister 32        negative genetic testing 2017, multi gene panel     Cancer Sister         thyroid and uterine cancer, breast, follicular lymphoma     Endometrial cancer Sister     Uterine cancer Sister 55        endometrial cancer, diagnosed @ time of hysterectomy for fibroids    Breast cancer Paternal Grandmother 80    Heart disease Father     Cancer Father         bladder cancer     COPD Father     Aneurysm Father     Melanoma Brother     Heart disease Brother     Cancer Brother         melanoma     Ovarian cancer Neg Hx     Diabetes Neg Hx     Stroke Neg Hx      OB History        0    Para   0    Term   0       0    AB   0    Living   0       SAB   0    TAB   0    Ectopic   0    Multiple   0    Live Births   0         "         /68   Ht 5' 3" (1.6 m)   Wt 79.8 kg (175 lb 14.8 oz)   LMP 2008 (Approximate) Comment: 3/2008  BMI 31.16 kg/m²         GYN & OB History  Patient's last menstrual period was 2008 (approximate).   Date of Last Pap: 2019    OB History    Para Term  AB Living   0 0 0 0 0 0   SAB TAB Ectopic Multiple Live Births   0 0 0 0 0       Review of Systems  Review of Systems   Constitutional: Negative for activity change, appetite change, fatigue and unexpected weight change.   HENT: Negative.    Eyes: Negative for visual disturbance.   Respiratory: Negative for shortness of breath and wheezing.    Cardiovascular: Negative for chest pain, palpitations and leg swelling.   Gastrointestinal: Negative for abdominal pain, bloating and blood in stool.   Endocrine: Positive for hair loss. Negative for diabetes.   Genitourinary: Positive for dyspareunia and vaginal dryness. Negative for decreased libido.   Musculoskeletal: Negative for back pain and joint swelling.   Integumentary:  Positive for hair changes. Negative for acne and nipple discharge.   Neurological: Negative for headaches.   Hematological: Does not bruise/bleed easily.   Psychiatric/Behavioral: Positive for sleep disturbance. Negative for depression. The patient is not nervous/anxious.    Breast: Negative for mastodynia and nipple discharge          Objective:      Physical Exam:   Constitutional: She is oriented to person, place, and time. She appears well-developed and well-nourished.    HENT:   Head: Normocephalic and atraumatic.    Eyes: Pupils are equal, round, and reactive to light. EOM are normal.    Neck: Normal range of motion. Neck supple.    Cardiovascular: Normal rate and regular rhythm.     Pulmonary/Chest: Effort normal and breath sounds normal.   BREASTS:  no mass, no tenderness, no deformity and no retraction. Right breast exhibits no inverted nipple, no mass, no nipple discharge, no skin change, no " tenderness, no bleeding and no swelling. Left breast exhibits no inverted nipple, no mass, no nipple discharge, +  skin change- Density from radiation changes no tenderness, no bleeding and no swelling. Breasts are symmetrical.              Abdominal: Soft. Bowel sounds are normal.     Genitourinary:          Pelvic exam was performed with patient supine.      Genitourinary Comments: PELVIC: Normal external genitalia without lesions.  Normal hair distribution.  Adequate perineal body, normal urethral meatus.  Vagina  Dry and poorly rugated, atrophic, without lesions or discharge.  Cervix pink, without lesions, discharge or tenderness.  No significant cystocele or rectocele.  Bimanual exam shows uterus to be normal size, regular, mobile and nontender.  Adnexa without masses or tenderness.    RECTAL:Deferred               Musculoskeletal: Normal range of motion and moves all extremeties.       Neurological: She is alert and oriented to person, place, and time.    Skin: Skin is warm and dry.    Psychiatric: She has a normal mood and affect.              Assessment:        1. Encounter for gynecological examination without abnormal finding    2. Vulvovaginitis    3. Encounter for screening mammogram for high-risk patient                Plan:        1. Encounter for gynecological examination without abnormal finding  COUNSELING:  The patient was counseled today on regular weight bearing exercise. Patient was counseled today on the new ACS guidelines for cervical cytology screening as well as the current recommendations for breast cancer screening. Counseling session lasted approximately 10 minutes, and all her questions were answered. She was advised to see her primary care physician for all other health maintenance.   FOLLOW-UP with me for next routine visit.       2. Vulvovaginitis      - clotrimazole-betamethasone 1-0.05% (LOTRISONE) cream; Apply topically 2 (two) times daily. Apply to affected area  Dispense: 15 g;  Refill: 6    3. Encounter for screening mammogram for high-risk patient      - Mammo Digital Screening Bilat w/ [facility]; Future       Follow up in about 1 year (around 7/16/2021).

## 2020-07-30 ENCOUNTER — TELEPHONE (OUTPATIENT)
Dept: INTERNAL MEDICINE | Facility: CLINIC | Age: 65
End: 2020-07-30

## 2020-08-08 ENCOUNTER — HOSPITAL ENCOUNTER (OUTPATIENT)
Dept: RADIOLOGY | Facility: HOSPITAL | Age: 65
Discharge: HOME | End: 2020-08-08
Attending: OBSTETRICS & GYNECOLOGY
Payer: COMMERCIAL

## 2020-08-08 DIAGNOSIS — Z12.31 SCREENING MAMMOGRAM, ENCOUNTER FOR: ICD-10-CM

## 2020-08-08 PROCEDURE — 77063 MAMMO DIGITAL SCREENING BILAT WITH TOMOSYNTHESIS_CAD: ICD-10-PCS | Mod: 26,,, | Performed by: RADIOLOGY

## 2020-08-08 PROCEDURE — 77063 BREAST TOMOSYNTHESIS BI: CPT | Mod: 26,,, | Performed by: RADIOLOGY

## 2020-08-08 PROCEDURE — 77067 SCR MAMMO BI INCL CAD: CPT | Mod: 26,,, | Performed by: RADIOLOGY

## 2020-08-08 PROCEDURE — 77067 SCR MAMMO BI INCL CAD: CPT | Mod: TC

## 2020-08-08 PROCEDURE — 77067 MAMMO DIGITAL SCREENING BILAT WITH TOMOSYNTHESIS_CAD: ICD-10-PCS | Mod: 26,,, | Performed by: RADIOLOGY

## 2020-12-09 ENCOUNTER — PATIENT OUTREACH (OUTPATIENT)
Dept: ADMINISTRATIVE | Facility: OTHER | Age: 65
End: 2020-12-09

## 2020-12-09 NOTE — PROGRESS NOTES
Requested updates within Care Everywhere.  Patient's chart was reviewed for overdue TIFFANY topics.  Immunizations reconciled.

## 2020-12-11 ENCOUNTER — OFFICE VISIT (OUTPATIENT)
Dept: ORTHOPEDICS | Facility: CLINIC | Age: 65
End: 2020-12-11
Payer: COMMERCIAL

## 2020-12-11 DIAGNOSIS — M17.0 PRIMARY OSTEOARTHRITIS OF BOTH KNEES: Primary | ICD-10-CM

## 2020-12-11 PROCEDURE — 99213 OFFICE O/P EST LOW 20 MIN: CPT | Mod: 25,S$GLB,, | Performed by: NURSE PRACTITIONER

## 2020-12-11 PROCEDURE — 20610 DRAIN/INJ JOINT/BURSA W/O US: CPT | Mod: 50,S$GLB,, | Performed by: NURSE PRACTITIONER

## 2020-12-11 PROCEDURE — 20610 PR DRAIN/INJECT LARGE JOINT/BURSA: ICD-10-PCS | Mod: 50,S$GLB,, | Performed by: NURSE PRACTITIONER

## 2020-12-11 PROCEDURE — 1125F AMNT PAIN NOTED PAIN PRSNT: CPT | Mod: S$GLB,,, | Performed by: NURSE PRACTITIONER

## 2020-12-11 PROCEDURE — 1125F PR PAIN SEVERITY QUANTIFIED, PAIN PRESENT: ICD-10-PCS | Mod: S$GLB,,, | Performed by: NURSE PRACTITIONER

## 2020-12-11 PROCEDURE — 99999 PR PBB SHADOW E&M-EST. PATIENT-LVL II: CPT | Mod: PBBFAC,,, | Performed by: NURSE PRACTITIONER

## 2020-12-11 PROCEDURE — 99213 PR OFFICE/OUTPT VISIT, EST, LEVL III, 20-29 MIN: ICD-10-PCS | Mod: 25,S$GLB,, | Performed by: NURSE PRACTITIONER

## 2020-12-11 PROCEDURE — 99999 PR PBB SHADOW E&M-EST. PATIENT-LVL II: ICD-10-PCS | Mod: PBBFAC,,, | Performed by: NURSE PRACTITIONER

## 2020-12-11 RX ADMIN — TRIAMCINOLONE ACETONIDE 80 MG: 40 INJECTION, SUSPENSION INTRA-ARTICULAR; INTRAMUSCULAR at 10:12

## 2020-12-11 NOTE — PROGRESS NOTES
CC: Injections of the Left Knee and Injections of the Right Knee      HPI: Pt with bilateral knee pain. The pain is aching and medial and worse with increased activity. She has been seen in the past and has known djd of both knees. She had cortisone injections with good relief. Her last injections were in November 2019 and they gave her complete relief until now. She would like to repeat cortisone injections today. She works as a  and is active. She is ambulating without assistive device. There is not a limp.     ROS  General: denies fever and chills  Resp: no c/o sob  CVS: no c/o cp  MSK: c/o bilateral knee pain which is aching and medial    PE  General: AAOx3, pleasant and cooperative  Resp: respirations even and unlabored  MSK: bilateral knee exam  0 degrees extension  120 degrees flexion  No warmth or erythema   - effusion  + tenderness over the medial jointline  Mild crepitus    Assessment:  Bilateral knee djd    Plan:  Cortisone injections bilateral knees today  nsaids prn  RICE  F/u as needed. Consider gel injections if no relief or limited relief from cortisone injections    Knee Injection Procedure Note  Diagnosis: bilateral knee degenerative arthritis  Indications: bilateral knee pain  Procedure Details: Verbal consent was obtained for the procedure. The injection site was identified and the skin was prepared with alcohol. The bilateral knee was injected from an anterolateral approach with 1 ml of Kenalog and 4 ml Lidocaine each under sterile technique using a 22 gauge needle. The needle was removed and the area cleansed and dressed.  Complications:  Patient tolerated the procedure well.    she was advised to rest the knee today, using ice and elevation as needed for comfort and swelling.Immediate relief of the knee pain may be short lived and secondary to the lidocaine. she may have an increase in discomfort tonight followed by steady improvement over the next several days. It may take 1-2 weeks  following the injection to get the full benefit of the medication.

## 2020-12-13 RX ORDER — TRIAMCINOLONE ACETONIDE 40 MG/ML
80 INJECTION, SUSPENSION INTRA-ARTICULAR; INTRAMUSCULAR
Status: COMPLETED | OUTPATIENT
Start: 2020-12-11 | End: 2020-12-11

## 2021-06-09 ENCOUNTER — PATIENT MESSAGE (OUTPATIENT)
Dept: INTERNAL MEDICINE | Facility: CLINIC | Age: 66
End: 2021-06-09

## 2021-06-09 DIAGNOSIS — Z00.00 ANNUAL PHYSICAL EXAM: Primary | ICD-10-CM

## 2021-06-26 ENCOUNTER — PATIENT OUTREACH (OUTPATIENT)
Dept: ADMINISTRATIVE | Facility: OTHER | Age: 66
End: 2021-06-26

## 2021-06-28 ENCOUNTER — OFFICE VISIT (OUTPATIENT)
Dept: ORTHOPEDICS | Facility: CLINIC | Age: 66
End: 2021-06-28
Payer: COMMERCIAL

## 2021-06-28 DIAGNOSIS — M17.0 PRIMARY OSTEOARTHRITIS OF BOTH KNEES: Primary | ICD-10-CM

## 2021-06-28 PROCEDURE — 99999 PR PBB SHADOW E&M-EST. PATIENT-LVL II: CPT | Mod: PBBFAC,,, | Performed by: NURSE PRACTITIONER

## 2021-06-28 PROCEDURE — 20610 PR DRAIN/INJECT LARGE JOINT/BURSA: ICD-10-PCS | Mod: 50,S$GLB,, | Performed by: NURSE PRACTITIONER

## 2021-06-28 PROCEDURE — 99213 PR OFFICE/OUTPT VISIT, EST, LEVL III, 20-29 MIN: ICD-10-PCS | Mod: 25,S$GLB,, | Performed by: NURSE PRACTITIONER

## 2021-06-28 PROCEDURE — 99213 OFFICE O/P EST LOW 20 MIN: CPT | Mod: 25,S$GLB,, | Performed by: NURSE PRACTITIONER

## 2021-06-28 PROCEDURE — 99999 PR PBB SHADOW E&M-EST. PATIENT-LVL II: ICD-10-PCS | Mod: PBBFAC,,, | Performed by: NURSE PRACTITIONER

## 2021-06-28 PROCEDURE — 20610 DRAIN/INJ JOINT/BURSA W/O US: CPT | Mod: 50,S$GLB,, | Performed by: NURSE PRACTITIONER

## 2021-06-28 RX ADMIN — TRIAMCINOLONE ACETONIDE 80 MG: 40 INJECTION, SUSPENSION INTRA-ARTICULAR; INTRAMUSCULAR at 10:06

## 2021-06-30 RX ORDER — TRIAMCINOLONE ACETONIDE 40 MG/ML
80 INJECTION, SUSPENSION INTRA-ARTICULAR; INTRAMUSCULAR
Status: COMPLETED | OUTPATIENT
Start: 2021-06-28 | End: 2021-06-28

## 2021-07-08 LAB
25(OH)D3 SERPL-MCNC: 28 NG/ML (ref 30–100)
ALBUMIN SERPL-MCNC: 4 G/DL (ref 3.6–5.1)
ALBUMIN/GLOB SERPL: 1.8 (CALC) (ref 1–2.5)
ALP SERPL-CCNC: 73 U/L (ref 37–153)
ALT SERPL-CCNC: 16 U/L (ref 6–29)
AST SERPL-CCNC: 13 U/L (ref 10–35)
BASOPHILS # BLD AUTO: 23 CELLS/UL (ref 0–200)
BASOPHILS NFR BLD AUTO: 0.3 %
BILIRUB DIRECT SERPL-MCNC: 0.1 MG/DL
BILIRUB INDIRECT SERPL-MCNC: 0.5 MG/DL (CALC) (ref 0.2–1.2)
BILIRUB SERPL-MCNC: 0.6 MG/DL (ref 0.2–1.2)
BUN SERPL-MCNC: 14 MG/DL (ref 7–25)
BUN/CREAT SERPL: NORMAL (CALC) (ref 6–22)
CALCIUM SERPL-MCNC: 8.9 MG/DL (ref 8.6–10.4)
CHLORIDE SERPL-SCNC: 105 MMOL/L (ref 98–110)
CHOLEST SERPL-MCNC: 239 MG/DL
CHOLEST/HDLC SERPL: 4.1 (CALC)
CO2 SERPL-SCNC: 30 MMOL/L (ref 20–32)
CREAT SERPL-MCNC: 0.67 MG/DL (ref 0.5–0.99)
EOSINOPHIL # BLD AUTO: 47 CELLS/UL (ref 15–500)
EOSINOPHIL NFR BLD AUTO: 0.6 %
ERYTHROCYTE [DISTWIDTH] IN BLOOD BY AUTOMATED COUNT: 13.5 % (ref 11–15)
GLOBULIN SER CALC-MCNC: 2.2 G/DL (CALC) (ref 1.9–3.7)
GLUCOSE SERPL-MCNC: 87 MG/DL (ref 65–99)
HBA1C MFR BLD: 5.6 % OF TOTAL HGB
HCT VFR BLD AUTO: 41.9 % (ref 35–45)
HDLC SERPL-MCNC: 59 MG/DL
HGB BLD-MCNC: 13.9 G/DL (ref 11.7–15.5)
LDLC SERPL CALC-MCNC: 160 MG/DL (CALC)
LYMPHOCYTES # BLD AUTO: 1630 CELLS/UL (ref 850–3900)
LYMPHOCYTES NFR BLD AUTO: 20.9 %
MCH RBC QN AUTO: 28.5 PG (ref 27–33)
MCHC RBC AUTO-ENTMCNC: 33.2 G/DL (ref 32–36)
MCV RBC AUTO: 86 FL (ref 80–100)
MONOCYTES # BLD AUTO: 367 CELLS/UL (ref 200–950)
MONOCYTES NFR BLD AUTO: 4.7 %
NEUTROPHILS # BLD AUTO: 5733 CELLS/UL (ref 1500–7800)
NEUTROPHILS NFR BLD AUTO: 73.5 %
NONHDLC SERPL-MCNC: 180 MG/DL (CALC)
PLATELET # BLD AUTO: 249 THOUSAND/UL (ref 140–400)
PMV BLD REES-ECKER: 9.6 FL (ref 7.5–12.5)
POTASSIUM SERPL-SCNC: 4.2 MMOL/L (ref 3.5–5.3)
PROT SERPL-MCNC: 6.2 G/DL (ref 6.1–8.1)
RBC # BLD AUTO: 4.87 MILLION/UL (ref 3.8–5.1)
SODIUM SERPL-SCNC: 143 MMOL/L (ref 135–146)
TRIGL SERPL-MCNC: 91 MG/DL
TSH SERPL-ACNC: 1.5 MIU/L (ref 0.4–4.5)
WBC # BLD AUTO: 7.8 THOUSAND/UL (ref 3.8–10.8)

## 2021-07-12 ENCOUNTER — HOSPITAL ENCOUNTER (OUTPATIENT)
Dept: RADIOLOGY | Facility: HOSPITAL | Age: 66
Discharge: HOME | End: 2021-07-12
Attending: INTERNAL MEDICINE
Payer: COMMERCIAL

## 2021-07-12 ENCOUNTER — OFFICE VISIT (OUTPATIENT)
Dept: INTERNAL MEDICINE | Facility: CLINIC | Age: 66
End: 2021-07-12
Payer: COMMERCIAL

## 2021-07-12 VITALS
SYSTOLIC BLOOD PRESSURE: 118 MMHG | HEIGHT: 63 IN | DIASTOLIC BLOOD PRESSURE: 80 MMHG | HEART RATE: 76 BPM | WEIGHT: 171.31 LBS | OXYGEN SATURATION: 97 % | BODY MASS INDEX: 30.35 KG/M2

## 2021-07-12 DIAGNOSIS — E78.5 HYPERLIPIDEMIA, UNSPECIFIED HYPERLIPIDEMIA TYPE: ICD-10-CM

## 2021-07-12 DIAGNOSIS — Z00.00 ANNUAL PHYSICAL EXAM: Primary | ICD-10-CM

## 2021-07-12 DIAGNOSIS — E55.9 VITAMIN D DEFICIENCY: ICD-10-CM

## 2021-07-12 DIAGNOSIS — I10 ESSENTIAL HYPERTENSION: ICD-10-CM

## 2021-07-12 DIAGNOSIS — M19.90 OSTEOARTHRITIS, UNSPECIFIED OSTEOARTHRITIS TYPE, UNSPECIFIED SITE: ICD-10-CM

## 2021-07-12 DIAGNOSIS — Z12.11 COLON CANCER SCREENING: ICD-10-CM

## 2021-07-12 DIAGNOSIS — Z85.3 PERSONAL HISTORY OF BREAST CANCER: ICD-10-CM

## 2021-07-12 PROCEDURE — 1126F PR PAIN SEVERITY QUANTIFIED, NO PAIN PRESENT: ICD-10-PCS | Mod: S$GLB,,, | Performed by: INTERNAL MEDICINE

## 2021-07-12 PROCEDURE — 3079F DIAST BP 80-89 MM HG: CPT | Mod: CPTII,S$GLB,, | Performed by: INTERNAL MEDICINE

## 2021-07-12 PROCEDURE — 71046 XR CHEST PA AND LATERAL: ICD-10-PCS | Mod: 26,,, | Performed by: RADIOLOGY

## 2021-07-12 PROCEDURE — 3079F PR MOST RECENT DIASTOLIC BLOOD PRESSURE 80-89 MM HG: ICD-10-PCS | Mod: CPTII,S$GLB,, | Performed by: INTERNAL MEDICINE

## 2021-07-12 PROCEDURE — 99999 PR PBB SHADOW E&M-EST. PATIENT-LVL III: CPT | Mod: PBBFAC,,, | Performed by: INTERNAL MEDICINE

## 2021-07-12 PROCEDURE — 99396 PR PREVENTIVE VISIT,EST,40-64: ICD-10-PCS | Mod: S$GLB,,, | Performed by: INTERNAL MEDICINE

## 2021-07-12 PROCEDURE — 71046 X-RAY EXAM CHEST 2 VIEWS: CPT | Mod: TC

## 2021-07-12 PROCEDURE — 71046 X-RAY EXAM CHEST 2 VIEWS: CPT | Mod: 26,,, | Performed by: RADIOLOGY

## 2021-07-12 PROCEDURE — 99396 PREV VISIT EST AGE 40-64: CPT | Mod: S$GLB,,, | Performed by: INTERNAL MEDICINE

## 2021-07-12 PROCEDURE — 3074F PR MOST RECENT SYSTOLIC BLOOD PRESSURE < 130 MM HG: ICD-10-PCS | Mod: CPTII,S$GLB,, | Performed by: INTERNAL MEDICINE

## 2021-07-12 PROCEDURE — 3074F SYST BP LT 130 MM HG: CPT | Mod: CPTII,S$GLB,, | Performed by: INTERNAL MEDICINE

## 2021-07-12 PROCEDURE — 99999 PR PBB SHADOW E&M-EST. PATIENT-LVL III: ICD-10-PCS | Mod: PBBFAC,,, | Performed by: INTERNAL MEDICINE

## 2021-07-12 PROCEDURE — 3008F PR BODY MASS INDEX (BMI) DOCUMENTED: ICD-10-PCS | Mod: CPTII,S$GLB,, | Performed by: INTERNAL MEDICINE

## 2021-07-12 PROCEDURE — 1126F AMNT PAIN NOTED NONE PRSNT: CPT | Mod: S$GLB,,, | Performed by: INTERNAL MEDICINE

## 2021-07-12 PROCEDURE — 3008F BODY MASS INDEX DOCD: CPT | Mod: CPTII,S$GLB,, | Performed by: INTERNAL MEDICINE

## 2021-07-12 RX ORDER — AMLODIPINE BESYLATE 5 MG/1
5 TABLET ORAL DAILY
Qty: 90 TABLET | Refills: 3 | Status: SHIPPED | OUTPATIENT
Start: 2021-07-12 | End: 2022-08-10 | Stop reason: SDUPTHER

## 2021-07-26 ENCOUNTER — OFFICE VISIT (OUTPATIENT)
Dept: OBSTETRICS AND GYNECOLOGY | Facility: CLINIC | Age: 66
End: 2021-07-26
Attending: OBSTETRICS & GYNECOLOGY
Payer: COMMERCIAL

## 2021-07-26 VITALS
SYSTOLIC BLOOD PRESSURE: 146 MMHG | WEIGHT: 154.31 LBS | HEIGHT: 63 IN | DIASTOLIC BLOOD PRESSURE: 84 MMHG | BODY MASS INDEX: 27.34 KG/M2

## 2021-07-26 DIAGNOSIS — Z01.419 ENCOUNTER FOR GYNECOLOGICAL EXAMINATION WITHOUT ABNORMAL FINDING: Primary | ICD-10-CM

## 2021-07-26 DIAGNOSIS — Z12.31 SCREENING MAMMOGRAM, ENCOUNTER FOR: ICD-10-CM

## 2021-07-26 DIAGNOSIS — N76.0 VULVOVAGINITIS: ICD-10-CM

## 2021-07-26 PROCEDURE — 3077F PR MOST RECENT SYSTOLIC BLOOD PRESSURE >= 140 MM HG: ICD-10-PCS | Mod: CPTII,S$GLB,, | Performed by: OBSTETRICS & GYNECOLOGY

## 2021-07-26 PROCEDURE — 1125F PR PAIN SEVERITY QUANTIFIED, PAIN PRESENT: ICD-10-PCS | Mod: CPTII,S$GLB,, | Performed by: OBSTETRICS & GYNECOLOGY

## 2021-07-26 PROCEDURE — 3079F DIAST BP 80-89 MM HG: CPT | Mod: CPTII,S$GLB,, | Performed by: OBSTETRICS & GYNECOLOGY

## 2021-07-26 PROCEDURE — 1159F PR MEDICATION LIST DOCUMENTED IN MEDICAL RECORD: ICD-10-PCS | Mod: CPTII,S$GLB,, | Performed by: OBSTETRICS & GYNECOLOGY

## 2021-07-26 PROCEDURE — 3077F SYST BP >= 140 MM HG: CPT | Mod: CPTII,S$GLB,, | Performed by: OBSTETRICS & GYNECOLOGY

## 2021-07-26 PROCEDURE — 3008F BODY MASS INDEX DOCD: CPT | Mod: CPTII,S$GLB,, | Performed by: OBSTETRICS & GYNECOLOGY

## 2021-07-26 PROCEDURE — 99396 PR PREVENTIVE VISIT,EST,40-64: ICD-10-PCS | Mod: S$GLB,,, | Performed by: OBSTETRICS & GYNECOLOGY

## 2021-07-26 PROCEDURE — 1160F PR REVIEW ALL MEDS BY PRESCRIBER/CLIN PHARMACIST DOCUMENTED: ICD-10-PCS | Mod: CPTII,S$GLB,, | Performed by: OBSTETRICS & GYNECOLOGY

## 2021-07-26 PROCEDURE — 99396 PREV VISIT EST AGE 40-64: CPT | Mod: S$GLB,,, | Performed by: OBSTETRICS & GYNECOLOGY

## 2021-07-26 PROCEDURE — 1159F MED LIST DOCD IN RCRD: CPT | Mod: CPTII,S$GLB,, | Performed by: OBSTETRICS & GYNECOLOGY

## 2021-07-26 PROCEDURE — 1160F RVW MEDS BY RX/DR IN RCRD: CPT | Mod: CPTII,S$GLB,, | Performed by: OBSTETRICS & GYNECOLOGY

## 2021-07-26 PROCEDURE — 1125F AMNT PAIN NOTED PAIN PRSNT: CPT | Mod: CPTII,S$GLB,, | Performed by: OBSTETRICS & GYNECOLOGY

## 2021-07-26 PROCEDURE — 3079F PR MOST RECENT DIASTOLIC BLOOD PRESSURE 80-89 MM HG: ICD-10-PCS | Mod: CPTII,S$GLB,, | Performed by: OBSTETRICS & GYNECOLOGY

## 2021-07-26 PROCEDURE — 3008F PR BODY MASS INDEX (BMI) DOCUMENTED: ICD-10-PCS | Mod: CPTII,S$GLB,, | Performed by: OBSTETRICS & GYNECOLOGY

## 2021-07-26 RX ORDER — CLOBETASOL PROPIONATE 0.5 MG/G
OINTMENT TOPICAL 2 TIMES DAILY
Qty: 30 G | Refills: 6 | Status: SHIPPED | OUTPATIENT
Start: 2021-07-26

## 2021-07-28 ENCOUNTER — HOSPITAL ENCOUNTER (OUTPATIENT)
Dept: CARDIOLOGY | Facility: CLINIC | Age: 66
Discharge: HOME | End: 2021-07-28
Payer: COMMERCIAL

## 2021-07-28 DIAGNOSIS — I10 ESSENTIAL HYPERTENSION: ICD-10-CM

## 2021-07-28 PROCEDURE — 93005 ELECTROCARDIOGRAM TRACING: CPT | Mod: S$GLB,,, | Performed by: INTERNAL MEDICINE

## 2021-07-28 PROCEDURE — 93010 ELECTROCARDIOGRAM REPORT: CPT | Mod: S$GLB,,, | Performed by: INTERNAL MEDICINE

## 2021-07-28 PROCEDURE — 93010 EKG 12-LEAD: ICD-10-PCS | Mod: S$GLB,,, | Performed by: INTERNAL MEDICINE

## 2021-07-28 PROCEDURE — 93005 EKG 12-LEAD: ICD-10-PCS | Mod: S$GLB,,, | Performed by: INTERNAL MEDICINE

## 2021-08-02 ENCOUNTER — TELEPHONE (OUTPATIENT)
Dept: INTERNAL MEDICINE | Facility: CLINIC | Age: 66
End: 2021-08-02

## 2021-08-02 ENCOUNTER — PATIENT OUTREACH (OUTPATIENT)
Dept: ADMINISTRATIVE | Facility: HOSPITAL | Age: 66
End: 2021-08-02

## 2021-08-03 ENCOUNTER — TELEPHONE (OUTPATIENT)
Dept: INTERNAL MEDICINE | Facility: CLINIC | Age: 66
End: 2021-08-03

## 2021-08-03 ENCOUNTER — PATIENT OUTREACH (OUTPATIENT)
Dept: ADMINISTRATIVE | Facility: HOSPITAL | Age: 66
End: 2021-08-03

## 2021-08-14 ENCOUNTER — HOSPITAL ENCOUNTER (OUTPATIENT)
Dept: RADIOLOGY | Facility: HOSPITAL | Age: 66
Discharge: HOME | End: 2021-08-14
Attending: OBSTETRICS & GYNECOLOGY
Payer: COMMERCIAL

## 2021-08-14 VITALS — WEIGHT: 169 LBS | HEIGHT: 63 IN | BODY MASS INDEX: 29.95 KG/M2

## 2021-08-14 DIAGNOSIS — Z12.31 ENCOUNTER FOR SCREENING MAMMOGRAM FOR HIGH-RISK PATIENT: ICD-10-CM

## 2021-08-14 PROCEDURE — 77063 BREAST TOMOSYNTHESIS BI: CPT | Mod: 26,,, | Performed by: RADIOLOGY

## 2021-08-14 PROCEDURE — 77063 MAMMO DIGITAL SCREENING BILAT WITH TOMOSYNTHESIS_CAD: ICD-10-PCS | Mod: 26,,, | Performed by: RADIOLOGY

## 2021-08-14 PROCEDURE — 77067 SCR MAMMO BI INCL CAD: CPT | Mod: 26,,, | Performed by: RADIOLOGY

## 2021-08-14 PROCEDURE — 77067 SCR MAMMO BI INCL CAD: CPT | Mod: TC

## 2021-08-14 PROCEDURE — 77067 MAMMO DIGITAL SCREENING BILAT WITH TOMOSYNTHESIS_CAD: ICD-10-PCS | Mod: 26,,, | Performed by: RADIOLOGY

## 2021-10-02 ENCOUNTER — IMMUNIZATION (OUTPATIENT)
Dept: INTERNAL MEDICINE | Facility: CLINIC | Age: 66
End: 2021-10-02
Payer: COMMERCIAL

## 2021-10-02 DIAGNOSIS — Z23 NEED FOR VACCINATION: Primary | ICD-10-CM

## 2021-10-02 PROCEDURE — 0003A COVID-19, MRNA, LNP-S, PF, 30 MCG/0.3 ML DOSE VACCINE: CPT | Mod: CV19,PBBFAC | Performed by: INTERNAL MEDICINE

## 2021-10-02 PROCEDURE — 91300 COVID-19, MRNA, LNP-S, PF, 30 MCG/0.3 ML DOSE VACCINE: CPT | Mod: PBBFAC | Performed by: INTERNAL MEDICINE

## 2021-10-27 ENCOUNTER — PATIENT OUTREACH (OUTPATIENT)
Dept: ADMINISTRATIVE | Facility: OTHER | Age: 66
End: 2021-10-27
Payer: COMMERCIAL

## 2021-10-29 ENCOUNTER — OFFICE VISIT (OUTPATIENT)
Dept: ORTHOPEDICS | Facility: CLINIC | Age: 66
End: 2021-10-29
Payer: COMMERCIAL

## 2021-10-29 ENCOUNTER — HOSPITAL ENCOUNTER (OUTPATIENT)
Dept: RADIOLOGY | Facility: HOSPITAL | Age: 66
Discharge: HOME | End: 2021-10-29
Attending: NURSE PRACTITIONER
Payer: COMMERCIAL

## 2021-10-29 DIAGNOSIS — M54.30 SCIATICA, UNSPECIFIED LATERALITY: Primary | ICD-10-CM

## 2021-10-29 DIAGNOSIS — M54.30 SCIATICA, UNSPECIFIED LATERALITY: ICD-10-CM

## 2021-10-29 PROCEDURE — 99213 PR OFFICE/OUTPT VISIT, EST, LEVL III, 20-29 MIN: ICD-10-PCS | Mod: S$GLB,,, | Performed by: NURSE PRACTITIONER

## 2021-10-29 PROCEDURE — 1160F RVW MEDS BY RX/DR IN RCRD: CPT | Mod: CPTII,S$GLB,, | Performed by: NURSE PRACTITIONER

## 2021-10-29 PROCEDURE — 1159F MED LIST DOCD IN RCRD: CPT | Mod: CPTII,S$GLB,, | Performed by: NURSE PRACTITIONER

## 2021-10-29 PROCEDURE — 3044F PR MOST RECENT HEMOGLOBIN A1C LEVEL <7.0%: ICD-10-PCS | Mod: CPTII,S$GLB,, | Performed by: NURSE PRACTITIONER

## 2021-10-29 PROCEDURE — 72110 X-RAY EXAM L-2 SPINE 4/>VWS: CPT | Mod: TC

## 2021-10-29 PROCEDURE — 72110 X-RAY EXAM L-2 SPINE 4/>VWS: CPT | Mod: 26,,, | Performed by: RADIOLOGY

## 2021-10-29 PROCEDURE — 99999 PR PBB SHADOW E&M-EST. PATIENT-LVL II: ICD-10-PCS | Mod: PBBFAC,,, | Performed by: NURSE PRACTITIONER

## 2021-10-29 PROCEDURE — 99213 OFFICE O/P EST LOW 20 MIN: CPT | Mod: S$GLB,,, | Performed by: NURSE PRACTITIONER

## 2021-10-29 PROCEDURE — 72110 XR LUMBAR SPINE AP AND LAT WITH FLEX/EXT: ICD-10-PCS | Mod: 26,,, | Performed by: RADIOLOGY

## 2021-10-29 PROCEDURE — 1159F PR MEDICATION LIST DOCUMENTED IN MEDICAL RECORD: ICD-10-PCS | Mod: CPTII,S$GLB,, | Performed by: NURSE PRACTITIONER

## 2021-10-29 PROCEDURE — 1160F PR REVIEW ALL MEDS BY PRESCRIBER/CLIN PHARMACIST DOCUMENTED: ICD-10-PCS | Mod: CPTII,S$GLB,, | Performed by: NURSE PRACTITIONER

## 2021-10-29 PROCEDURE — 99999 PR PBB SHADOW E&M-EST. PATIENT-LVL II: CPT | Mod: PBBFAC,,, | Performed by: NURSE PRACTITIONER

## 2021-10-29 PROCEDURE — 3044F HG A1C LEVEL LT 7.0%: CPT | Mod: CPTII,S$GLB,, | Performed by: NURSE PRACTITIONER

## 2021-10-29 RX ORDER — METHYLPREDNISOLONE 4 MG/1
TABLET ORAL
Qty: 21 EACH | Refills: 0 | Status: SHIPPED | OUTPATIENT
Start: 2021-10-29 | End: 2021-11-19

## 2021-12-22 ENCOUNTER — PATIENT MESSAGE (OUTPATIENT)
Dept: ORTHOPEDICS | Facility: CLINIC | Age: 66
End: 2021-12-22
Payer: COMMERCIAL

## 2021-12-22 DIAGNOSIS — M54.30 SCIATICA, UNSPECIFIED LATERALITY: Primary | ICD-10-CM

## 2021-12-28 ENCOUNTER — CLINICAL SUPPORT (OUTPATIENT)
Dept: REHABILITATION | Facility: OTHER | Age: 66
End: 2021-12-28
Attending: NURSE PRACTITIONER
Payer: COMMERCIAL

## 2021-12-28 DIAGNOSIS — M79.605 PAIN IN LATERAL LEFT LOWER EXTREMITY: ICD-10-CM

## 2021-12-28 PROCEDURE — 97162 PT EVAL MOD COMPLEX 30 MIN: CPT | Mod: PN

## 2021-12-28 PROCEDURE — 97110 THERAPEUTIC EXERCISES: CPT | Mod: PN

## 2022-01-11 ENCOUNTER — CLINICAL SUPPORT (OUTPATIENT)
Dept: REHABILITATION | Facility: OTHER | Age: 67
End: 2022-01-11
Payer: COMMERCIAL

## 2022-01-11 DIAGNOSIS — M79.605 PAIN IN LATERAL LEFT LOWER EXTREMITY: Primary | ICD-10-CM

## 2022-01-11 PROCEDURE — 97110 THERAPEUTIC EXERCISES: CPT | Mod: PN | Performed by: PHYSICAL THERAPIST

## 2022-01-11 NOTE — PROGRESS NOTES
"[facility]   Physical Therapy Treatment Note     Name: Marti Coley  Clinic Number: [id number]    Therapy Diagnosis:   Encounter Diagnosis   Name Primary?    Pain in lateral left lower extremity Yes     Physician: Marilu Edwards NP    Visit Date: 1/11/2022    Physician Orders: PT Eval and Treat   Medical Diagnosis from Referral: M54.30 (ICD-10-CM) - Sciatica, unspecified laterality  Evaluation Date: 12/28/2021  Authorization Period Expiration: 12/22/2022  Plan of Care Expiration: 3/27/2022  Progress Note Due: 1/27/2022  Visit # / Visits authorized: 2/ 20   FOTO: 1/5 on 12/28/2021     Precautions: Standard       PTA Visit #: 0/5     Time In: 1630  Time Out: 1715  Total Billable Time: 45 minutes    SUBJECTIVE     Pt reports: doing well overall, but still having pain with standing and walking. She says she did most of her HEP but had difficulty with prone hip ext.  She was compliant with home exercise program.  Response to previous treatment: no change  Functional change: no change    Pain: 3/10  Location: left hip and leg down to foot      OBJECTIVE     Objective Measures updated at progress report unless specified.     Treatment     Marti received the treatments listed below:      therapeutic exercises to develop strength, endurance, ROM, flexibility, posture and core stabilization for 45 minutes including:    +TrA 5" x 2 min  +PPT 5" x 2 min  +Bridges 5" x 20  +BKFO 2 x 10    SL hip abd 3 x 10 x 3"  SL clams 3 x 10 x 3"  SL reverse clams 3 x 10 x 3"  Prone hip ext x 30 x 3"  +SLR flex with pilates [place] for TrA activation, 3 x 10  +Piriformis stretch 3 x 30"        Patient Education and Home Exercises     Home Exercises Provided and Patient Education Provided     Education provided:   - therapy rationale    Written Home Exercises Provided: yes. Exercises were reviewed and Marti was able to demonstrate them prior to the end of the session.  Marti demonstrated good  " understanding of the education provided. See EMR under Patient Instructions for exercises provided during therapy sessions    ASSESSMENT     Good tolerance to initial treatment session. Difficulty with isolating TrA but good progress with verbal/visual/tactile cuing. Mild mm fatigue reported at end of session, but able to perform all activities with no increase in pain reported.     Marti Is progressing well towards her goals.   Pt prognosis is Good.     Pt will continue to benefit from skilled outpatient physical therapy to address the deficits listed in the problem list box on initial evaluation, provide pt/family education and to maximize pt's level of independence in the home and community environment.     Pt's spiritual, cultural and educational needs considered and pt agreeable to plan of care and goals.     Anticipated barriers to physical therapy: standard, commute to/from work, previous back and knee dysfunction and pain     Goals: IE 12/28/2021  Short Term Goals (6 Weeks):   1. Pt will report 20% reduction in pain of the lumbar spine and LE for ease with ADL's. Not met, progressing  2. PT will demonstrate improved trunk strength by a half grade in for ease with upright posture during standing activities. Not met, progressing  3. Pt will demonstrate improved lumbar spine ROM in all directions by a half grade for ease with bending activities.  Not met, progressing  4. Pt to demonstrate improved functional ability with FOTO limitation <=44% disability. Not met, progressing     Long Term Goals (12 Weeks):   1. Pt will report being independent with HEP for maintenance of improvements gained during therapy sessions Not met, progressing  2. PT will report 50% reduction of pain of the back and LE for ease with dressing and grooming activities.  Not met, progressing  3. Pt will demonstrate trunk and extremity strength to >=4+/5 without the provocation of pain for ease with household chores Not met,  progressing  4. Pt will demonstrate appropriate upright posture without external cueing for ease with work related activities.  Not met, progressing  5. Pt to demonstrate improved functional ability with FOTO limitation <=34% disability. Not met, progressing      PLAN   Plan of care Certification: 12/28/2021 to 3/28/2022.     Continue per plan of care with focus on improving B LE and core strengthening.     Niecy Hanna, PT

## 2022-01-13 ENCOUNTER — CLINICAL SUPPORT (OUTPATIENT)
Dept: REHABILITATION | Facility: OTHER | Age: 67
End: 2022-01-13
Payer: COMMERCIAL

## 2022-01-13 DIAGNOSIS — M79.605 PAIN IN LATERAL LEFT LOWER EXTREMITY: ICD-10-CM

## 2022-01-13 PROCEDURE — 97110 THERAPEUTIC EXERCISES: CPT | Mod: PN

## 2022-01-13 NOTE — PROGRESS NOTES
"[facility]   Physical Therapy Treatment Note     Name: Marti Coley  Clinic Number: [id number]    Therapy Diagnosis:   Encounter Diagnosis   Name Primary?    Pain in lateral left lower extremity      Physician: Marilu Edwards NP    Visit Date: 1/13/2022    Physician Orders: PT Eval and Treat   Medical Diagnosis from Referral: M54.30 (ICD-10-CM) - Sciatica, unspecified laterality  Evaluation Date: 12/28/2021  Authorization Period Expiration: 12/22/2022  Plan of Care Expiration: 3/27/2022  Progress Note Due: 1/27/2022  Visit # / Visits authorized: 3/ 20   FOTO: 1/5 on 12/28/2021     Precautions: Standard       PTA Visit #: 0/5     Time In: 430  Time Out: 515  Total Billable Time: 45 minutes    SUBJECTIVE     Pt reports: continued back pain with pain down the L leg. Denies improvements since previous visit.     She was compliant with home exercise program.  Response to previous treatment: no change  Functional change: no change    Pain: 3/10  Location: left hip and leg down to foot      OBJECTIVE     Objective Measures updated at progress report unless specified.     Treatment     Marti received the treatments listed below:      therapeutic exercises to develop strength, endurance, ROM, flexibility, posture and core stabilization for 45 minutes including:    +PT assisted L quad stretch 2 x 1' with C/R holds     TrA 5" x 2 min  PPT 5" x 2 min  Bridges 5" x 20  BKFO 2 x 10 with PPT today    SL hip abd 3 x 10 x 3"  SL clams 3 x 10 x 3"  SL reverse clams 3 x 10 x 3"    +Carlos glute set 5 x 10", unilat glute sets x 2 min  Prone hip ext x 30 x 3"  +SLR flex with pilates [place] for TrA activation, 3 x 10  +Piriformis stretch 3 x 30"        Patient Education and Home Exercises     Home Exercises Provided and Patient Education Provided     Education provided:   - therapy rationale    Written Home Exercises Provided: yes. Exercises were reviewed and Marti was able to demonstrate them " prior to the end of the session.  Marti demonstrated good  understanding of the education provided. See EMR under Patient Instructions for exercises provided during therapy sessions    ASSESSMENT     Pt presents with poor L LE quad flexibility as well as poor unilateral glute activation due to decreased motor control and coordination. Improvements in tolerance with hip ext with gluteal activation following Carlos and unilat glute sets for muscle activation and coordination. Able to achieve 120 deg of knee flex in prone on the L after contract-relax stretching with PT assist. Pt presents with abnormal sequencing during bridges with poor PPT and gluteal activation despite heavy and frequent VC/TC.      Marti Is progressing well towards her goals.   Pt prognosis is Good.     Pt will continue to benefit from skilled outpatient physical therapy to address the deficits listed in the problem list box on initial evaluation, provide pt/family education and to maximize pt's level of independence in the home and community environment.     Pt's spiritual, cultural and educational needs considered and pt agreeable to plan of care and goals.     Anticipated barriers to physical therapy: standard, commute to/from work, previous back and knee dysfunction and pain     Goals: IE 12/28/2021  Short Term Goals (6 Weeks):   1. Pt will report 20% reduction in pain of the lumbar spine and LE for ease with ADL's. Not met, progressing  2. PT will demonstrate improved trunk strength by a half grade in for ease with upright posture during standing activities. Not met, progressing  3. Pt will demonstrate improved lumbar spine ROM in all directions by a half grade for ease with bending activities.  Not met, progressing  4. Pt to demonstrate improved functional ability with FOTO limitation <=44% disability. Not met, progressing     Long Term Goals (12 Weeks):   1. Pt will report being independent with HEP for maintenance of improvements  gained during therapy sessions Not met, progressing  2. PT will report 50% reduction of pain of the back and LE for ease with dressing and grooming activities.  Not met, progressing  3. Pt will demonstrate trunk and extremity strength to >=4+/5 without the provocation of pain for ease with household chores Not met, progressing  4. Pt will demonstrate appropriate upright posture without external cueing for ease with work related activities.  Not met, progressing  5. Pt to demonstrate improved functional ability with FOTO limitation <=34% disability. Not met, progressing      PLAN   Plan of care Certification: 12/28/2021 to 3/28/2022.     Continue per plan of care with focus on improving B LE and core strengthening.     Yesica Tran, PT

## 2022-01-17 ENCOUNTER — PATIENT MESSAGE (OUTPATIENT)
Dept: ADMINISTRATIVE | Facility: OTHER | Age: 67
End: 2022-01-17
Payer: COMMERCIAL

## 2022-01-25 ENCOUNTER — CLINICAL SUPPORT (OUTPATIENT)
Dept: REHABILITATION | Facility: OTHER | Age: 67
End: 2022-01-25
Payer: COMMERCIAL

## 2022-01-25 DIAGNOSIS — M79.605 PAIN IN LATERAL LEFT LOWER EXTREMITY: Primary | ICD-10-CM

## 2022-01-25 PROCEDURE — 97110 THERAPEUTIC EXERCISES: CPT | Mod: PN | Performed by: PHYSICAL THERAPIST

## 2022-01-25 NOTE — PROGRESS NOTES
"[facility]   Physical Therapy Treatment Note     Name: Marti Coley  Clinic Number: [id number]    Therapy Diagnosis:   Encounter Diagnosis   Name Primary?    Pain in lateral left lower extremity Yes     Physician: Marilu Edwards NP    Visit Date: 1/25/2022    Physician Orders: PT Eval and Treat   Medical Diagnosis from Referral: M54.30 (ICD-10-CM) - Sciatica, unspecified laterality  Evaluation Date: 12/28/2021  Authorization Period Expiration: 12/22/2022  Plan of Care Expiration: 3/27/2022  Progress Note Due: 1/27/2022  Visit # / Visits authorized: 4/ 20   FOTO: 1/5 on 12/28/2021     Precautions: Standard       PTA Visit #: 0/5     Time In: 1720  Time Out: 1810  Total Billable Time: 50 minutes    SUBJECTIVE     Pt reports: feeling good, no pain at all today. She says she feels like she's starting to see some improvements with therapy.     She was compliant with home exercise program.  Response to previous treatment: no change  Functional change: no change    Pain: 0/10  Location: left hip and leg down to foot      OBJECTIVE     Objective Measures updated at progress report unless specified.     Treatment     Marti received the treatments listed below:      therapeutic exercises to develop strength, endurance, ROM, flexibility, posture and core stabilization for 50 minutes including:    PT assisted L quad stretch 2 x 1' with C/R holds     TrA 5" x 2 min  PPT 5" x 2 min  Bridges 5" x 20 with yoga block squeeze  BKFO 2 x 10 with PPT today  SLR flex with pilates [place] for TrA activation, 3 x 10    SL hip abd 3 x 10 x 3"  SL clams 3 x 10 x 3"  SL reverse clams 3 x 10 x 3"    Carlos glute set 5 x 10", unilat glute sets x 2 min (deer in headlights)  Prone hip ext x 10 x 3"; donkey kicks 2 x 10    Piriformis stretch 3 x 30"        Patient Education and Home Exercises     Home Exercises Provided and Patient Education Provided     Education provided:   - therapy rationale    Written Home " Exercises Provided: yes. Exercises were reviewed and Marti was able to demonstrate them prior to the end of the session.  Marti demonstrated good  understanding of the education provided. See EMR under Patient Instructions for exercises provided during therapy sessions    ASSESSMENT     Good tolerance to treatment today. Pt with some c/o knee pain with prone ext (pain with returning leg to table), improved with modification to bent knee position. Good improvement in quad flexibility noted compared to last visit, continues with mm tightness noted L>R.       Marti Is progressing well towards her goals.   Pt prognosis is Good.     Pt will continue to benefit from skilled outpatient physical therapy to address the deficits listed in the problem list box on initial evaluation, provide pt/family education and to maximize pt's level of independence in the home and community environment.     Pt's spiritual, cultural and educational needs considered and pt agreeable to plan of care and goals.     Anticipated barriers to physical therapy: standard, commute to/from work, previous back and knee dysfunction and pain     Goals: IE 12/28/2021  Short Term Goals (6 Weeks):   1. Pt will report 20% reduction in pain of the lumbar spine and LE for ease with ADL's. Not met, progressing  2. PT will demonstrate improved trunk strength by a half grade in for ease with upright posture during standing activities. Not met, progressing  3. Pt will demonstrate improved lumbar spine ROM in all directions by a half grade for ease with bending activities.  Not met, progressing  4. Pt to demonstrate improved functional ability with FOTO limitation <=44% disability. Not met, progressing     Long Term Goals (12 Weeks):   1. Pt will report being independent with HEP for maintenance of improvements gained during therapy sessions Not met, progressing  2. PT will report 50% reduction of pain of the back and LE for ease with dressing and  grooming activities.  Not met, progressing  3. Pt will demonstrate trunk and extremity strength to >=4+/5 without the provocation of pain for ease with household chores Not met, progressing  4. Pt will demonstrate appropriate upright posture without external cueing for ease with work related activities.  Not met, progressing  5. Pt to demonstrate improved functional ability with FOTO limitation <=34% disability. Not met, progressing      PLAN   Plan of care Certification: 12/28/2021 to 3/28/2022.     Continue per plan of care with focus on improving B LE and core strengthening.     Niecy Hanna, PT

## 2022-01-27 ENCOUNTER — CLINICAL SUPPORT (OUTPATIENT)
Dept: REHABILITATION | Facility: OTHER | Age: 67
End: 2022-01-27
Payer: COMMERCIAL

## 2022-01-27 DIAGNOSIS — M79.605 PAIN IN LATERAL LEFT LOWER EXTREMITY: ICD-10-CM

## 2022-01-27 PROCEDURE — 97110 THERAPEUTIC EXERCISES: CPT | Mod: PN | Performed by: PHYSICAL THERAPIST

## 2022-01-27 NOTE — PROGRESS NOTES
"[facility]   Physical Therapy Treatment Note     Name: Marti Coley  Clinic Number: [id number]    Therapy Diagnosis:   Encounter Diagnosis   Name Primary?   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      She was compliant with home exercise program.  Response to previous treatment: no change  Functional change: no change    Pain: 0/10  Location: left hip and leg down to foot      OBJECTIVE     Objective Measures updated at progress report unless specified.     Treatment     Marti received the treatments listed below:      therapeutic exercises to develop strength, endurance, ROM, flexibility, posture and core stabilization for 50 minutes including:    PT assisted L quad stretch 2 x 30" with C/R holds     TrA 5" x 2 min  PPT 5" x 2 min  Bridges 5" x 20 with yoga block squeeze  BKFO 3 x 10 with PPT today  SLR flex with pilates [place] for TrA activation, 3 x 10    SL hip abd 3 x 10 x 3"  SL clams 3 x 10 x 3"  SL reverse clams 3 x 10 x 3"    Carlos glute set 5 x 10", unilat glute sets x 2 min (deer in headlights)  Prone hip ext (donkey kicks) 3 x 10    Piriformis stretch 3 x 30"    +[place] press OTB 10x        Patient Education and Home Exercises     Home Exercises Provided and Patient Education Provided     Education provided:   - therapy rationale    Written Home Exercises Provided: " yes. Exercises were reviewed and Marti was able to demonstrate them prior to the end of the session.  Marti demonstrated good  understanding of the education provided. See EMR under Patient Instructions for exercises provided during therapy sessions    ASSESSMENT     Good tolerance to treatment today. Good improvement noted to quadriceps flexibility, min deficits B L>R. No knee pain reported using bent knee for prone hip extension. Initiated anti-rotations with mild c/o mm fatigue but able to complete.       Marti Is progressing well towards her goals.   Pt prognosis is Good.     Pt will continue to benefit from skilled outpatient physical therapy to address the deficits listed in the problem list box on initial evaluation, provide pt/family education and to maximize pt's level of independence in the home and community environment.     Pt's spiritual, cultural and educational needs considered and pt agreeable to plan of care and goals.     Anticipated barriers to physical therapy: standard, commute to/from work, previous back and knee dysfunction and pain     Goals: IE 12/28/2021  Short Term Goals (6 Weeks):   1. Pt will report 20% reduction in pain of the lumbar spine and LE for ease with ADL's. Not met, progressing  2. PT will demonstrate improved trunk strength by a half grade in for ease with upright posture during standing activities. Not met, progressing  3. Pt will demonstrate improved lumbar spine ROM in all directions by a half grade for ease with bending activities.  Not met, progressing  4. Pt to demonstrate improved functional ability with FOTO limitation <=44% disability. Not met, progressing     Long Term Goals (12 Weeks):   1. Pt will report being independent with HEP for maintenance of improvements gained during therapy sessions Not met, progressing  2. PT will report 50% reduction of pain of the back and LE for ease with dressing and grooming activities.  Not met, progressing  3. Pt will  demonstrate trunk and extremity strength to >=4+/5 without the provocation of pain for ease with household chores Not met, progressing  4. Pt will demonstrate appropriate upright posture without external cueing for ease with work related activities.  Not met, progressing  5. Pt to demonstrate improved functional ability with FOTO limitation <=34% disability. Not met, progressing      PLAN   Plan of care Certification: 12/28/2021 to 3/28/2022.     Continue per plan of care with focus on improving B LE and core strengthening.     Niecy Hanna, PT

## 2022-02-01 ENCOUNTER — CLINICAL SUPPORT (OUTPATIENT)
Dept: REHABILITATION | Facility: OTHER | Age: 67
End: 2022-02-01
Payer: COMMERCIAL

## 2022-02-01 DIAGNOSIS — M79.605 PAIN IN LATERAL LEFT LOWER EXTREMITY: Primary | ICD-10-CM

## 2022-02-01 PROCEDURE — 97110 THERAPEUTIC EXERCISES: CPT | Mod: PN | Performed by: PHYSICAL THERAPIST

## 2022-02-01 NOTE — PROGRESS NOTES
"[facility]   Physical Therapy Treatment Note     Name: Marti Coley  Clinic Number: [id number]    Therapy Diagnosis:   Encounter Diagnosis   Name Primary?    Pain in lateral left lower extremity Yes     Physician: Marilu Edwards NP    Visit Date: 2/1/2022    Physician Orders: PT Eval and Treat   Medical Diagnosis from Referral: M54.30 (ICD-10-CM) - Sciatica, unspecified laterality  Evaluation Date: 12/28/2021  Authorization Period Expiration: 12/22/2022  Plan of Care Expiration: 3/27/2022  Progress Note Due: 2/25/2022  Visit # / Visits authorized: 6/ 20   FOTO: 2/5 on 12/28/2021, 2/1/2022     Precautions: Standard       PTA Visit #: 0/5     Time In: 1715  Time Out: 1800  Total Billable Time: 45 minutes    SUBJECTIVE     Pt reports: overall she's been feeling much better. She had some pain to her LE on Sunday, not sure of aggravating factors. Recently she was able to tolerate standing and talking [place] a [place] in the [place] without pain. Has not tried to increase her walking yet, she would like to be able to walk a mile without pain.       She was compliant with home exercise program.  Response to previous treatment: no change  Functional change: no change    Pain: 0/10  Location: left hip and leg down to foot      OBJECTIVE     Objective Measures updated at progress report unless specified.      2/1/2022     Thoracic/Lumbar AROM: Pain/Dysfunction with Movement:   Flexion Min alvarado, fingertips 4" above floor, tension across low back, flat back with poor lumbar curve   Extension Min alvarado   Right side bending Mod alvarado, fingertips 2" above knee joint line, tension in L flank   Left side bending Min alvarado, fingertips 2" above knee joint line   Right rotation Mild alvarado, no pain   Left rotation Mild alvarado, no pain         Lower Extremity Strength  Right LE   Left LE     Hip flexion: 4+/5 Hip flexion: 4/5   Hip extension:  4/5 Hip extension: 4-/5   Hip abduction: 4/5 Hip abduction: 4/5   Hip " "adduction:  4+/5 Hip adduction:  4+/5   Knee Flexion 4+/5 Knee Flexion 4+/5   Knee Extension 4+/5 - grinding in knee joint Knee Extension 4+/5 - grinding in knee joint         CMS Impairment/Limitation/Restriction for FOTO Lumbar Spine Survey    Therapist reviewed FOTO scores for Marti Coley on 2/1/2022.   FOTO documents entered into [employer] - see Media section.    Evaluation: 54%    Current : 28%    Goal: 36%             Treatment     Marti received the treatments listed below:      therapeutic exercises to develop strength, endurance, ROM, flexibility, posture and core stabilization for 45 minutes including:    Reassessment   PT assisted L quad stretch 2 x 30" with C/R holds     TrA 5" x 2 min  PPT 5" x 2 min  Bridges 5" x 20 with yoga block squeeze  BKFO 2 x 10 with PPT with OTB  SLR flex with pilates [place] under CL LE 2 x 10    SL hip abd 2 x 10 x 3" with 2#  SL clams 3 x 10 x 3" with OTB  SL reverse clams 3 x 10 x 3" with YTB    Carlos glute set 5 x 10", unilat glute sets x 2 min (deer in headlights)  Prone hip ext (donkey kicks) 3 x 10    Piriformis stretch 3 x 30"    +Parloff press OTB 10x        Patient Education and Home Exercises     Home Exercises Provided and Patient Education Provided     Education provided:   - therapy rationale    Written Home Exercises Provided: yes. Exercises were reviewed and Marti was able to demonstrate them prior to the end of the session.  Marti demonstrated good  understanding of the education provided. See EMR under Patient Instructions for exercises provided during therapy sessions    ASSESSMENT     Marti is making excellent progress with therapy, she has met all short term goals and is progressing well towards long term goals. She reports centralization of radicular pain, with exception of flare up Sunday that resolved. She is still limited with functional mobility and has not yet progressed walking program. With assessment, pt presents with good " improvements but continues with some limitation to lumbar AROM and strength with MMT to B hips (L>R). At this time it is recommended that pt continue with skilled intervention to improve B LE and core strength and stability for tolerance to standing and ambulation in home, community, and work settings. Pt instructed to gradually increasing walking time/distance to work towards her goal of walking 1 mile without pain exacerbation.   Good tolerance to treatment today, increased resistance with hip strengthening as noted. Mild difficulty with added resistance, but able to complete. Verbal cuing for posterior pelvic tilt required with SLR in flexion.       Marti Is progressing well towards her goals.   Pt prognosis is Good.     Pt will continue to benefit from skilled outpatient physical therapy to address the deficits listed in the problem list box on initial evaluation, provide pt/family education and to maximize pt's level of independence in the home and community environment.     Pt's spiritual, cultural and educational needs considered and pt agreeable to plan of care and goals.     Anticipated barriers to physical therapy: standard, commute to/from work, previous back and knee dysfunction and pain     Goals: IE 12/28/2021  Short Term Goals (6 Weeks):   1. Pt will report 20% reduction in pain of the lumbar spine and LE for ease with ADL's. Met 2/1/2022  2. PT will demonstrate improved trunk strength by a half grade in for ease with upright posture during standing activities. Met 2/1/2022  3. Pt will demonstrate improved lumbar spine ROM in all directions by a half grade for ease with bending activities.  Met 2/1/2022  4. Pt to demonstrate improved functional ability with FOTO limitation <=44% disability. Met 2/1/2022     Long Term Goals (12 Weeks):   1. Pt will report being independent with HEP for maintenance of improvements gained during therapy sessions Not met, progressing  2. PT will report 50% reduction  of pain of the back and LE for ease with dressing and grooming activities.  Not met, progressing  3. Pt will demonstrate trunk and extremity strength to >=4+/5 without the provocation of pain for ease with household chores Not met, progressing  4. Pt will demonstrate appropriate upright posture without external cueing for ease with work related activities.  Not met, progressing  5. Pt to demonstrate improved functional ability with FOTO limitation <=34% disability. Met 2/1/2022      PLAN   Plan of care Certification: 12/28/2021 to 3/28/2022.     Continue per plan of care with focus on improving B LE and core strengthening.     Niecy Hanna, PT

## 2022-02-03 ENCOUNTER — CLINICAL SUPPORT (OUTPATIENT)
Dept: REHABILITATION | Facility: OTHER | Age: 67
End: 2022-02-03
Payer: COMMERCIAL

## 2022-02-03 DIAGNOSIS — M79.605 PAIN IN LATERAL LEFT LOWER EXTREMITY: ICD-10-CM

## 2022-02-03 PROCEDURE — 97110 THERAPEUTIC EXERCISES: CPT | Mod: PN

## 2022-02-03 NOTE — PROGRESS NOTES
"[facility]   Physical Therapy Treatment Note     Name: Marti Coley  Clinic Number: [id number]    Therapy Diagnosis:   Encounter Diagnosis   Name Primary?    Pain in lateral left lower extremity      Physician: Marilu Edwards NP    Visit Date: 2/3/2022    Physician Orders: PT Eval and Treat   Medical Diagnosis from Referral: M54.30 (ICD-10-CM) - Sciatica, unspecified laterality  Evaluation Date: 12/28/2021  Authorization Period Expiration: 12/22/2022  Plan of Care Expiration: 3/27/2022  Progress Note Due: 2/25/2022  Visit # / Visits authorized: 6/ 20   FOTO: 2/5 on 12/28/2021, 2/1/2022     Precautions: Standard       PTA Visit #: 0/5     Time In: 4:30  Time Out: 5:15  Total Billable Time: 45 minutes    SUBJECTIVE     Pt reports: overall she's been feeling much better. She had some pain to her LE on Sunday, not sure of aggravating factors. Recently she was able to tolerate standing and talking [place] a [place] in the [place] without pain. Has not tried to increase her walking yet, she would like to be able to walk a mile without pain.       She was compliant with home exercise program.  Response to previous treatment: no change  Functional change: no change    Pain: 0/10  Location: left hip and leg down to foot      OBJECTIVE     Objective Measures updated at progress report unless specified.      2/1/2022         CMS Impairment/Limitation/Restriction for FOTO Lumbar Spine Survey    Therapist reviewed FOTO scores for Marti Coley on 2/1/2022.   FOTO documents entered into [employer] - see Media section.    Evaluation: 54%    Current : 28%    Goal: 36%             Treatment     Marti received the treatments listed below:      therapeutic exercises to develop strength, endurance, ROM, flexibility, posture and core stabilization for 45 minutes including:    PT assisted L quad stretch 2 x 30" with C/R holds     TrA 5" x 2 min  PPT 5" x 2 min  Bridges 5" x 20 with yoga block squeeze  BKFO 2 " "x 10 with PPT with OTB  SLR flex with pilates [place] under CL LE 2 x 10    SL hip abd 2 x 10 x 3" with 2#  SL clams 3 x 10 x 3" with OTB  SL reverse clams 3 x 10 x 3" with YTB    Carlos glute set 5 x 10", unilat glute sets x 2 min (deer in headlights)  Prone hip ext (donkey kicks) 1 x 15 x 2# - unable to complete on L knee due to c/o pain    Piriformis stretch 3 x 30"    Parloff press OTB 15x          Patient Education and Home Exercises     Home Exercises Provided and Patient Education Provided     Education provided:   - therapy rationale    Written Home Exercises Provided: yes. Exercises were reviewed and Marti was able to demonstrate them prior to the end of the session.  Marti demonstrated good  understanding of the education provided. See EMR under Patient Instructions for exercises provided during therapy sessions    ASSESSMENT     New exercises added in standing to promote core and trunk strength and coordination. Good tolerance with appropriate training effect noted. Poor tolerance with prone donkey kicks, with limited ability to perform using LLE due to c/o pain.        Marti Is progressing well towards her goals.   Pt prognosis is Good.     Pt will continue to benefit from skilled outpatient physical therapy to address the deficits listed in the problem list box on initial evaluation, provide pt/family education and to maximize pt's level of independence in the home and community environment.     Pt's spiritual, cultural and educational needs considered and pt agreeable to plan of care and goals.     Anticipated barriers to physical therapy: standard, commute to/from work, previous back and knee dysfunction and pain     Goals: IE 12/28/2021  Short Term Goals (6 Weeks):   1. Pt will report 20% reduction in pain of the lumbar spine and LE for ease with ADL's. Met 2/1/2022  2. PT will demonstrate improved trunk strength by a half grade in for ease with upright posture during standing activities. Met " 2/1/2022  3. Pt will demonstrate improved lumbar spine ROM in all directions by a half grade for ease with bending activities.  Met 2/1/2022  4. Pt to demonstrate improved functional ability with FOTO limitation <=44% disability. Met 2/1/2022     Long Term Goals (12 Weeks):   1. Pt will report being independent with HEP for maintenance of improvements gained during therapy sessions Not met, progressing  2. PT will report 50% reduction of pain of the back and LE for ease with dressing and grooming activities.  Not met, progressing  3. Pt will demonstrate trunk and extremity strength to >=4+/5 without the provocation of pain for ease with household chores Not met, progressing  4. Pt will demonstrate appropriate upright posture without external cueing for ease with work related activities.  Not met, progressing  5. Pt to demonstrate improved functional ability with FOTO limitation <=34% disability. Met 2/1/2022      PLAN   Plan of care Certification: 12/28/2021 to 3/28/2022.     Continue per plan of care with focus on improving B LE and core strengthening.     Yesica Tran, PT

## 2022-02-08 ENCOUNTER — CLINICAL SUPPORT (OUTPATIENT)
Dept: REHABILITATION | Facility: OTHER | Age: 67
End: 2022-02-08
Payer: COMMERCIAL

## 2022-02-08 DIAGNOSIS — M79.605 PAIN IN LATERAL LEFT LOWER EXTREMITY: ICD-10-CM

## 2022-02-08 PROCEDURE — 97110 THERAPEUTIC EXERCISES: CPT | Mod: PN

## 2022-02-08 NOTE — PROGRESS NOTES
"[facility]   Physical Therapy Treatment Note     Name: Marti Coley  Clinic Number: [id number]    Therapy Diagnosis:   Encounter Diagnosis   Name Primary?    Pain in lateral left lower extremity      Physician: Marilu Edwards NP    Visit Date: 2/8/2022    Physician Orders: PT Eval and Treat   Medical Diagnosis from Referral: M54.30 (ICD-10-CM) - Sciatica, unspecified laterality  Evaluation Date: 12/28/2021  Authorization Period Expiration: 12/22/2022  Plan of Care Expiration: 3/27/2022  Progress Note Due: 2/25/2022  Visit # / Visits authorized: 7/ 20   FOTO: 2/5 on 12/28/2021, 2/1/2022     Precautions: Standard       PTA Visit #: 0/5     Time In: 4:30  Time Out: 5:15  Total Billable Time: 45 minutes    SUBJECTIVE     Pt reports: she is sad because she lost her 96 y/o father over the weekend. But she is happy because since last visit she was able to walk 30 minutes at a time for cardiovascular health without back or leg pain. She is not back to her old walking routine yet but she feels hopeful!    She was compliant with home exercise program.  Response to previous treatment: no change  Functional change: no change    Pain: 0/10  Location: left hip and leg down to foot      OBJECTIVE     Objective Measures updated at progress report unless specified.      2/1/2022       Treatment     Marti received the treatments listed below:      therapeutic exercises to develop strength, endurance, ROM, flexibility, posture and core stabilization for 45 minutes including:    PT assisted L quad stretch 2 x 30" with C/R holds     TrA 5" x 2 min  PPT 5" x 2 min  Bridges 5" x 20 with yoga block squeeze  BKFO 2 x 10 with PPT with GTB  SLR flex with pilates [place] under CL LE 2 x 10    SL hip abd 2 x 10 x 3" with 2#  SL clams 2 x 10 x 3" with GTB  SL reverse clams 2 x 10 x 3" with OTB    Carlos glute set 5 x 10", unilat glute sets x 2 min (deer in headlights)  Prone hip ext (donkey kicks) 1 x 15 x 2# - " "unable to complete on L knee due to c/o pain    Piriformis stretch 3 x 30"    Parloff press OTB 15x  +narrow rows: 20 x GTB  +SALPD: 20 x OTB  +sit to stands with pilates ring squeeze: 1 x 10        Patient Education and Home Exercises     Home Exercises Provided and Patient Education Provided     Education provided:   - therapy rationale    Written Home Exercises Provided: yes. Exercises were reviewed and Marti was able to demonstrate them prior to the end of the session.  Marti demonstrated good  understanding of the education provided. See EMR under Patient Instructions for exercises provided during therapy sessions    ASSESSMENT     Defer prone exercises today due to previous c/o increased leg pain. New exercises added to promote functional strength and postural endurance. Good tolerance overall with appropriate training effect noted.        Marti Is progressing well towards her goals.   Pt prognosis is Good.     Pt will continue to benefit from skilled outpatient physical therapy to address the deficits listed in the problem list box on initial evaluation, provide pt/family education and to maximize pt's level of independence in the home and community environment.     Pt's spiritual, cultural and educational needs considered and pt agreeable to plan of care and goals.     Anticipated barriers to physical therapy: standard, commute to/from work, previous back and knee dysfunction and pain     Goals: IE 12/28/2021  Short Term Goals (6 Weeks):   1. Pt will report 20% reduction in pain of the lumbar spine and LE for ease with ADL's. Met 2/1/2022  2. PT will demonstrate improved trunk strength by a half grade in for ease with upright posture during standing activities. Met 2/1/2022  3. Pt will demonstrate improved lumbar spine ROM in all directions by a half grade for ease with bending activities.  Met 2/1/2022  4. Pt to demonstrate improved functional ability with FOTO limitation <=44% disability. Met " 2/1/2022     Long Term Goals (12 Weeks):   1. Pt will report being independent with HEP for maintenance of improvements gained during therapy sessions Not met, progressing  2. PT will report 50% reduction of pain of the back and LE for ease with dressing and grooming activities.  Not met, progressing  3. Pt will demonstrate trunk and extremity strength to >=4+/5 without the provocation of pain for ease with household chores Not met, progressing  4. Pt will demonstrate appropriate upright posture without external cueing for ease with work related activities.  Not met, progressing  5. Pt to demonstrate improved functional ability with FOTO limitation <=34% disability. Met 2/1/2022      PLAN   Plan of care Certification: 12/28/2021 to 3/28/2022.     Continue per plan of care with focus on improving B LE and core strengthening.     Yesica Tran, PT

## 2022-02-09 NOTE — PROGRESS NOTES
"[facility]   Physical Therapy Treatment Note     Name: Marti Coley  Clinic Number: [id number]    Therapy Diagnosis:   Encounter Diagnosis   Name Primary?    Pain in lateral left lower extremity      Physician: Marilu Edwards NP    Visit Date: 2/10/2022    Physician Orders: PT Eval and Treat   Medical Diagnosis from Referral: M54.30 (ICD-10-CM) - Sciatica, unspecified laterality  Evaluation Date: 12/28/2021  Authorization Period Expiration: 12/22/2022  Plan of Care Expiration: 3/27/2022  Progress Note Due: 2/25/2022  Visit # / Visits authorized: 7/ 20   FOTO: 2/5 on 12/28/2021, 2/1/2022     Precautions: Standard       PTA Visit #: 0/5     Time In: 4:30  Time Out: 5:15  Total Billable Time: 45 minutes    SUBJECTIVE     Pt reports: feeling good today and thought that the last session really helped.   She was compliant with home exercise program.  Response to previous treatment: no change  Functional change: no change    Pain: 0/10  Location: left hip and leg down to foot      OBJECTIVE     Objective Measures updated at progress report unless specified.      2/1/2022       Treatment     Marti received the treatments listed below:      therapeutic exercises to develop strength, endurance, ROM, flexibility, posture and core stabilization for 45 minutes including:    PT assisted L quad stretch 2 x 30" with C/R holds     TrA 5" x 2 min  PPT 5" x 2 min  Bridges 5" x 20 with yoga block squeeze  BKFO 3 x 10 with PPT with GTB  SLR flex with pilates [place] under CL LE 2 x 10 x 2#    SL hip abd 2 x 10 x 3" with 2#  SL clams 2 x 10 x 3" with GTB  SL reverse clams 2 x 10 x 3" with OTB    Carlos glute set 5 x 10", unilat glute sets x 2 min (deer in headlights)  Prone hip ext (donkey kicks) 1 x 15 x 2# - unable to complete on L knee due to c/o pain    Piriformis stretch 3 x 30"    Parloff press OTB 15x  narrow rows: 20 x GTB  SALPD: 20 x OTB  sit to stands with pilates ring squeeze: 2 x 10 -- blue " cushion on chair    Consider adding in the future: side stepping, monster walks, standing 3-way hip?        Patient Education and Home Exercises     Home Exercises Provided and Patient Education Provided     Education provided:   - therapy rationale    Written Home Exercises Provided: yes. Exercises were reviewed and Marti was able to demonstrate them prior to the end of the session.  Marti demonstrated good  understanding of the education provided. See EMR under Patient Instructions for exercises provided during therapy sessions    ASSESSMENT     Modified sit to stands using cushion to elevate seat height as pt c/o increased knee pain STACI with lower chair height. Increased resistance during SLR and SL hip abd with pt noting good fatigue and appropriate training effect. Plan to progress functional hip strengthening next visit.      Marti Is progressing well towards her goals.   Pt prognosis is Good.     Pt will continue to benefit from skilled outpatient physical therapy to address the deficits listed in the problem list box on initial evaluation, provide pt/family education and to maximize pt's level of independence in the home and community environment.     Pt's spiritual, cultural and educational needs considered and pt agreeable to plan of care and goals.     Anticipated barriers to physical therapy: standard, commute to/from work, previous back and knee dysfunction and pain     Goals: IE 12/28/2021  Short Term Goals (6 Weeks):   1. Pt will report 20% reduction in pain of the lumbar spine and LE for ease with ADL's. Met 2/1/2022  2. PT will demonstrate improved trunk strength by a half grade in for ease with upright posture during standing activities. Met 2/1/2022  3. Pt will demonstrate improved lumbar spine ROM in all directions by a half grade for ease with bending activities.  Met 2/1/2022  4. Pt to demonstrate improved functional ability with FOTO limitation <=44% disability. Met 2/1/2022      Long Term Goals (12 Weeks):   1. Pt will report being independent with HEP for maintenance of improvements gained during therapy sessions Not met, progressing  2. PT will report 50% reduction of pain of the back and LE for ease with dressing and grooming activities.  Not met, progressing  3. Pt will demonstrate trunk and extremity strength to >=4+/5 without the provocation of pain for ease with household chores Not met, progressing  4. Pt will demonstrate appropriate upright posture without external cueing for ease with work related activities.  Not met, progressing  5. Pt to demonstrate improved functional ability with FOTO limitation <=34% disability. Met 2/1/2022      PLAN   Plan of care Certification: 12/28/2021 to 3/28/2022.     Continue per plan of care with focus on improving B LE and core strengthening.     Yesica Tran, PT

## 2022-02-10 ENCOUNTER — CLINICAL SUPPORT (OUTPATIENT)
Dept: REHABILITATION | Facility: OTHER | Age: 67
End: 2022-02-10
Payer: COMMERCIAL

## 2022-02-10 DIAGNOSIS — M79.605 PAIN IN LATERAL LEFT LOWER EXTREMITY: ICD-10-CM

## 2022-02-10 PROCEDURE — 97110 THERAPEUTIC EXERCISES: CPT | Mod: PN

## 2022-02-17 ENCOUNTER — CLINICAL SUPPORT (OUTPATIENT)
Dept: REHABILITATION | Facility: OTHER | Age: 67
End: 2022-02-17
Payer: COMMERCIAL

## 2022-02-17 DIAGNOSIS — M79.605 PAIN IN LATERAL LEFT LOWER EXTREMITY: ICD-10-CM

## 2022-02-17 PROCEDURE — 97110 THERAPEUTIC EXERCISES: CPT | Mod: PN

## 2022-02-17 NOTE — PROGRESS NOTES
"[facility]   Physical Therapy Treatment Note     Name: Marti Coley  Clinic Number: [id number]    Therapy Diagnosis:   Encounter Diagnosis   Name Primary?    Pain in lateral left lower extremity      Physician: Marilu Edwards NP    Visit Date: 2/17/2022    Physician Orders: PT Eval and Treat   Medical Diagnosis from Referral: M54.30 (ICD-10-CM) - Sciatica, unspecified laterality  Evaluation Date: 12/28/2021  Authorization Period Expiration: 12/22/2022  Plan of Care Expiration: 3/27/2022  Progress Note Due: 2/25/2022  Visit # / Visits authorized: 7/ 20   FOTO: 2/5 on 12/28/2021, 2/1/2022     Precautions: Standard       PTA Visit #: 0/5     Time In: 4:30  Time Out: 5:15  Total Billable Time: 45 minutes    SUBJECTIVE     Pt reports: feeling good today. She gets occasional leg pain but it is low level and does not last. Says that she is looking to wind down therapy after [facility]. She is fearful of a flare up with all the walking and standing she will be doing at [facility]. She is leaving to go OOT x 2 weeks, but has a few appointments once she returns to make sure she is confident with HEP and that she has no flare ups while OOT.    She was compliant with home exercise program.  Response to previous treatment: no change  Functional change: no change    Pain: 0/10  Location: left hip and leg down to foot      OBJECTIVE     Objective Measures updated at progress report unless specified.      2/1/2022       Treatment     Marti received the treatments listed below:      therapeutic exercises to develop strength, endurance, ROM, flexibility, posture and core stabilization for 45 minutes including:    PT assisted L quad stretch 2 x 30" with C/R holds     TrA 5" x 2 min  PPT 5" x 2 min  Bridges 5" x 20 with yoga block squeeze  BKFO 3 x 10 with PPT with GTB  SLR flex with pilates [place] under CL LE 3 x 10 x 2#    SL hip abd 3 x 10 x 3" with 2#  SL clams 3 x 10 x 3" with GTB  SL reverse " "clams 3 x 10 x 3" with OTB    Carlos glute set 5 x 10", unilat glute sets x 2 min (deer in headlights)  Prone hip ext (donkey kicks) 1 x 15 x 2# - unable to complete on L knee due to c/o pain    Piriformis stretch 3 x 30"    Parloff press OTB 15x  narrow rows: 20 x GTB  SALPD: 20 x OTB  sit to stands with pilates ring squeeze: 2 x 10 -- blue cushion on chair    Consider adding in the future: side stepping, monster walks, standing 3-way hip?        Patient Education and Home Exercises     Home Exercises Provided and Patient Education Provided     Education provided:   - therapy rationale    Written Home Exercises Provided: yes. Exercises were reviewed and Marti was able to demonstrate them prior to the end of the session.  Marti demonstrated good  understanding of the education provided. See EMR under Patient Instructions for exercises provided during therapy sessions    ASSESSMENT     Reviewed HEP as pt will be going OOT x 2 weeks. Pt verbalized understanding and demonstrates good return technique. Plan to progress strengthening upon return and updated HEP to promote compliance and consistency at home.      Marti Is progressing well towards her goals.   Pt prognosis is Good.     Pt will continue to benefit from skilled outpatient physical therapy to address the deficits listed in the problem list box on initial evaluation, provide pt/family education and to maximize pt's level of independence in the home and community environment.     Pt's spiritual, cultural and educational needs considered and pt agreeable to plan of care and goals.     Anticipated barriers to physical therapy: standard, commute to/from work, previous back and knee dysfunction and pain     Goals: IE 12/28/2021  Short Term Goals (6 Weeks):   1. Pt will report 20% reduction in pain of the lumbar spine and LE for ease with ADL's. Met 2/1/2022  2. PT will demonstrate improved trunk strength by a half grade in for ease with upright posture " during standing activities. Met 2/1/2022  3. Pt will demonstrate improved lumbar spine ROM in all directions by a half grade for ease with bending activities.  Met 2/1/2022  4. Pt to demonstrate improved functional ability with FOTO limitation <=44% disability. Met 2/1/2022     Long Term Goals (12 Weeks):   1. Pt will report being independent with HEP for maintenance of improvements gained during therapy sessions Not met, progressing  2. PT will report 50% reduction of pain of the back and LE for ease with dressing and grooming activities.  Not met, progressing  3. Pt will demonstrate trunk and extremity strength to >=4+/5 without the provocation of pain for ease with household chores Not met, progressing  4. Pt will demonstrate appropriate upright posture without external cueing for ease with work related activities.  Not met, progressing  5. Pt to demonstrate improved functional ability with FOTO limitation <=34% disability. Met 2/1/2022      PLAN   Plan of care Certification: 12/28/2021 to 3/28/2022.     Continue per plan of care with focus on improving B LE and core strengthening.     Yesica Tran, PT

## 2022-03-03 ENCOUNTER — CLINICAL SUPPORT (OUTPATIENT)
Dept: REHABILITATION | Facility: OTHER | Age: 67
End: 2022-03-03
Payer: COMMERCIAL

## 2022-03-03 DIAGNOSIS — M79.605 PAIN IN LATERAL LEFT LOWER EXTREMITY: Primary | ICD-10-CM

## 2022-03-03 PROCEDURE — 97110 THERAPEUTIC EXERCISES: CPT | Mod: PN

## 2022-03-03 NOTE — PROGRESS NOTES
"[facility]   Physical Therapy Treatment Note     Name: Marti Coley  Clinic Number: [id number]    Therapy Diagnosis:   Encounter Diagnosis   Name Primary?    Pain in lateral left lower extremity Yes     Physician: Marilu Edwards NP    Visit Date: 3/3/2022    Physician Orders: PT Eval and Treat   Medical Diagnosis from Referral: M54.30 (ICD-10-CM) - Sciatica, unspecified laterality  Evaluation Date: 12/28/2021  Authorization Period Expiration: 12/22/2022  Plan of Care Expiration: 3/27/2022  Progress Note Due: 2/25/2022  Visit # / Visits authorized: 7/ 20   FOTO: 2/5 on 12/28/2021, 2/1/2022, 3/3/2022     Precautions: Standard       PTA Visit #: 0/5     Time In: 5:15  Time Out: 6:00  Total Billable Time: 45 minutes    SUBJECTIVE     Pt reports: being OOT x 2 weeks on vacation with family, and then was out for a 3rd week from PT as she had company in town for [place]. Says that her legs felt fine throughout vacation. Notes she slipped and fell 1 hour prior to coming to PT. Pt reports feeling flustered by the fall but notes the only injury sustained was scraping her left knee. Says that she would like 1-2 more weeks of PT and then would like to discharge. Feels unsure of discharge because of being away for so long then having a fall today.    She was compliant with home exercise program.  Response to previous treatment: no change  Functional change: no change    Pain: 0/10  Location: left hip and leg down to foot      OBJECTIVE     Objective Measures updated at progress report unless specified.      2/1/2022    3/3/2022 - FOTO limitation = 25% disability -- reports her knee OA limits her more than her back    Treatment     Marti received the treatments listed below:      therapeutic exercises to develop strength, endurance, ROM, flexibility, posture and core stabilization for 45 minutes including:    PT assisted L quad stretch 2 x 30" with C/R holds     TrA 5" x 2 min  PPT 5" x 2 " "min  Bridges 5" x 20 with yoga block squeeze  BKFO 3 x 10 with PPT with GTB  SLR flex with pilates [place] under CL LE 3 x 10 x 2#    SL hip abd 3 x 10 x 3" with 2#  SL clams 3 x 10 x 3" with GTB  SL reverse clams 3 x 10 x 3" with OTB    Carlos glute set 5 x 10", unilat glute sets x 2 min (deer in headlights)  Prone hip ext (donkey kicks) 1 x 15 x 2# - unable to complete on L knee due to c/o pain    Piriformis stretch 3 x 30"    Parloff press OTB 15x  narrow rows: 20 x GTB  SALPD: 20 x OTB  sit to stands with pilates ring squeeze: 2 x 10 -- blue cushion on chair    Consider adding in the future: side stepping, monster walks, standing 3-way hip?        Patient Education and Home Exercises     Home Exercises Provided and Patient Education Provided     Education provided:   - therapy rationale    Written Home Exercises Provided: yes. Exercises were reviewed and Marti was able to demonstrate them prior to the end of the session.  Marti demonstrated good  understanding of the education provided. See EMR under Patient Instructions for exercises provided during therapy sessions    ASSESSMENT     Pt presents with red skin and light scrapping over left patella s/p a fall today. Says knee feels stiff but denies significant pain. Defer sit to stands and progression of therex today due to fall prior to attending PT. Plan to progress strengthening next visit pending pt tolerance.    Marti Is progressing well towards her goals.   Pt prognosis is Good.     Pt will continue to benefit from skilled outpatient physical therapy to address the deficits listed in the problem list box on initial evaluation, provide pt/family education and to maximize pt's level of independence in the home and community environment.     Pt's spiritual, cultural and educational needs considered and pt agreeable to plan of care and goals.     Anticipated barriers to physical therapy: standard, commute to/from work, previous back and knee dysfunction " and pain     Goals: IE 12/28/2021  Short Term Goals (6 Weeks):   1. Pt will report 20% reduction in pain of the lumbar spine and LE for ease with ADL's. Met 2/1/2022  2. PT will demonstrate improved trunk strength by a half grade in for ease with upright posture during standing activities. Met 2/1/2022  3. Pt will demonstrate improved lumbar spine ROM in all directions by a half grade for ease with bending activities.  Met 2/1/2022  4. Pt to demonstrate improved functional ability with FOTO limitation <=44% disability. Met 2/1/2022     Long Term Goals (12 Weeks):   1. Pt will report being independent with HEP for maintenance of improvements gained during therapy sessions Not met, progressing  2. PT will report 50% reduction of pain of the back and LE for ease with dressing and grooming activities.  Not met, progressing  3. Pt will demonstrate trunk and extremity strength to >=4+/5 without the provocation of pain for ease with household chores Not met, progressing  4. Pt will demonstrate appropriate upright posture without external cueing for ease with work related activities.  Not met, progressing  5. Pt to demonstrate improved functional ability with FOTO limitation <=34% disability. Met 2/1/2022      PLAN   Plan of care Certification: 12/28/2021 to 3/28/2022.     Continue per plan of care with focus on improving B LE and core strengthening.     Yesica Tran, PT

## 2022-03-08 ENCOUNTER — CLINICAL SUPPORT (OUTPATIENT)
Dept: REHABILITATION | Facility: OTHER | Age: 67
End: 2022-03-08
Payer: COMMERCIAL

## 2022-03-08 DIAGNOSIS — M79.605 PAIN IN LATERAL LEFT LOWER EXTREMITY: Primary | ICD-10-CM

## 2022-03-08 PROCEDURE — 97110 THERAPEUTIC EXERCISES: CPT | Mod: PN

## 2022-03-08 NOTE — PROGRESS NOTES
[facility]   Physical Therapy Treatment Note     Name: Marti Coley  Clinic Number: [id number]    Therapy Diagnosis:   Encounter Diagnosis   Name Primary?    Pain in lateral left lower extremity Yes     Physician: Marilu Edwards NP    Visit Date: 3/8/2022    Physician Orders: PT Eval and Treat   Medical Diagnosis from Referral: M54.30 (ICD-10-CM) - Sciatica, unspecified laterality  Evaluation Date: 12/28/2021  Authorization Period Expiration: 12/22/2022  Plan of Care Expiration: 3/27/2022  Progress Note Due: 2/25/2022  Visit # / Visits authorized: 7/ 20   FOTO: 12/28/2021, 2/1/2022, 3/3/2022, 3/8/2022     Precautions: Standard       PTA Visit #: 0/5     Time In: 5:15  Time Out: 5:40  Total Billable Time: 25 minutes    SUBJECTIVE     Pt reports: feeling good, no difficulties at home. Denies recent flare ups. Would like today to be her last day. Would like an updated HEP.    She was compliant with home exercise program.  Response to previous treatment: no change  Functional change: no change    Pain: 0/10  Location: left hip and leg down to foot      OBJECTIVE     Objective Measures updated at progress report unless specified.      2/1/2022    3/3/2022 - FOTO limitation = 25% disability -- reports her knee OA limits her more than her back    3/8/2022    Thoracic/Lumbar AROM: Pain/Dysfunction with Movement:   Flexion WFL, no pain   Extension Min alvarado   Right side bending WFL, no pain   Left side bending WFL, no pain   Right rotation WFL, no pian   Left rotation WFL, no pain         Lower Extremity Strength  Right LE   Left LE     Hip flexion: 5/5 Hip flexion: 5/5   Hip extension:  4+/5 Hip extension: 4+/5   Hip abduction: 4+/5 Hip abduction: 4+/5   Hip adduction:  5/5 Hip adduction:  5/5   Knee Flexion 5/5 Knee Flexion 5/5   Knee Extension 4+/5 - grinding in knee joint Knee Extension 4+/5 - grinding in knee joint               Treatment     Marti received the treatments listed  "below:      therapeutic exercises to develop strength, endurance, ROM, flexibility, posture and core stabilization for 25 minutes including:    Reassessment x 10'  +Added to update HEP and progress functional strengthening:   side stepping 2 x 20 ft x OTB at knees   monster walks 2 x 20 ft x OTB at knees   standing 3-way hip x 15 x OTB at knees    Not performed  PT assisted L quad stretch 2 x 30" with C/R holds   TrA 5" x 2 min  PPT 5" x 2 min  Bridges 5" x 20 with yoga block squeeze  BKFO 3 x 10 with PPT with GTB  SLR flex with pilates [place] under CL LE 3 x 10 x 2#  SL hip abd 3 x 10 x 3" with 2#  SL clams 3 x 10 x 3" with GTB  SL reverse clams 3 x 10 x 3" with OTB  Parloff press OTB 15x  narrow rows: 20 x GTB  SALPD: 20 x OTB  sit to stands with pilates ring squeeze: 2 x 10 -- blue cushion on chair          Patient Education and Home Exercises     Home Exercises Provided and Patient Education Provided     Education provided:   - therapy rationale    Written Home Exercises Provided: yes. Exercises were reviewed and Marti was able to demonstrate them prior to the end of the session.  Marti demonstrated good  understanding of the education provided. See EMR under Patient Instructions for exercises provided during therapy sessions    ASSESSMENT     Pt presents with functional strength, endurance, and mobility to allow for increased ease with all ADLs and work activities without c/o LBP. Knee OA remains primary limiting factor with more strenuous activities. Pt is independent with HEP and demonstrates good return technique. Pt no longer has pain or difficulty with functional activities and has returned to PLOF. Pt has progressed well and has met 9 of 9 therapeutic goals. Pt no longer requires skilled PT. Recommend D/C from skilled care.    Goals:  Short Term Goals (6 Weeks):   1. Pt will report 20% reduction in pain of the lumbar spine and LE for ease with ADL's. Met 2/1/2022  2. PT will demonstrate improved " trunk strength by a half grade in for ease with upright posture during standing activities. Met 2/1/2022  3. Pt will demonstrate improved lumbar spine ROM in all directions by a half grade for ease with bending activities.  Met 2/1/2022  4. Pt to demonstrate improved functional ability with FOTO limitation <=44% disability. Met 2/1/2022     Long Term Goals (12 Weeks):   1. Pt will report being independent with HEP for maintenance of improvements gained during therapy sessions  Met, 3/8/2022  2. PT will report 50% reduction of pain of the back and LE for ease with dressing and grooming activities.   Met, 3/8/2022  3. Pt will demonstrate trunk and extremity strength to >=4+/5 without the provocation of pain for ease with household chores  Met, 3/8/2022  4. Pt will demonstrate appropriate upright posture without external cueing for ease with work related activities.  Met, 3/8/2022  5. Pt to demonstrate improved functional ability with FOTO limitation <=34% disability. Met 2/1/2022      PLAN     D/C with HEP. Advised pt to F/U with MD for Carlos knee pain.    Yesica Tran, PT

## 2022-04-16 ENCOUNTER — IMMUNIZATION (OUTPATIENT)
Dept: PRIMARY CARE CLINIC | Facility: CLINIC | Age: 67
End: 2022-04-16
Payer: COMMERCIAL

## 2022-04-16 DIAGNOSIS — Z23 NEED FOR VACCINATION: Primary | ICD-10-CM

## 2022-04-16 PROCEDURE — 91305 COVID-19, MRNA, LNP-S, PF, 30 MCG/0.3 ML DOSE VACCINE (PFIZER): CPT | Mod: PBBFAC | Performed by: INTERNAL MEDICINE

## 2022-05-30 ENCOUNTER — PATIENT MESSAGE (OUTPATIENT)
Dept: ORTHOPEDICS | Facility: CLINIC | Age: 67
End: 2022-05-30
Payer: COMMERCIAL

## 2022-06-06 ENCOUNTER — PATIENT MESSAGE (OUTPATIENT)
Dept: ADMINISTRATIVE | Facility: OTHER | Age: 67
End: 2022-06-06
Payer: COMMERCIAL

## 2022-06-15 ENCOUNTER — HOSPITAL ENCOUNTER (OUTPATIENT)
Dept: RADIOLOGY | Facility: HOSPITAL | Age: 67
Discharge: HOME | End: 2022-06-15
Attending: NURSE PRACTITIONER
Payer: COMMERCIAL

## 2022-06-15 ENCOUNTER — OFFICE VISIT (OUTPATIENT)
Dept: ORTHOPEDICS | Facility: CLINIC | Age: 67
End: 2022-06-15
Payer: COMMERCIAL

## 2022-06-15 DIAGNOSIS — M70.32 BURSITIS OF LEFT ELBOW, UNSPECIFIED BURSA: ICD-10-CM

## 2022-06-15 DIAGNOSIS — M25.522 LEFT ELBOW PAIN: Primary | ICD-10-CM

## 2022-06-15 DIAGNOSIS — M25.522 LEFT ELBOW PAIN: ICD-10-CM

## 2022-06-15 PROCEDURE — 20605 DRAIN/INJ JOINT/BURSA W/O US: CPT | Mod: LT,S$GLB,, | Performed by: NURSE PRACTITIONER

## 2022-06-15 PROCEDURE — 99999 PR PBB SHADOW E&M-EST. PATIENT-LVL III: ICD-10-PCS | Mod: PBBFAC,,, | Performed by: NURSE PRACTITIONER

## 2022-06-15 PROCEDURE — 1160F RVW MEDS BY RX/DR IN RCRD: CPT | Mod: CPTII,S$GLB,, | Performed by: NURSE PRACTITIONER

## 2022-06-15 PROCEDURE — 73080 XR ELBOW COMPLETE 3 VIEW LEFT: ICD-10-PCS | Mod: 26,LT,, | Performed by: RADIOLOGY

## 2022-06-15 PROCEDURE — 20605 PR DRAIN/INJECT INTERMEDIATE JOINT/BURSA: ICD-10-PCS | Mod: LT,S$GLB,, | Performed by: NURSE PRACTITIONER

## 2022-06-15 PROCEDURE — 99213 PR OFFICE/OUTPT VISIT, EST, LEVL III, 20-29 MIN: ICD-10-PCS | Mod: 25,S$GLB,, | Performed by: NURSE PRACTITIONER

## 2022-06-15 PROCEDURE — 99213 OFFICE O/P EST LOW 20 MIN: CPT | Mod: 25,S$GLB,, | Performed by: NURSE PRACTITIONER

## 2022-06-15 PROCEDURE — 73080 X-RAY EXAM OF ELBOW: CPT | Mod: TC,LT

## 2022-06-15 PROCEDURE — 1160F PR REVIEW ALL MEDS BY PRESCRIBER/CLIN PHARMACIST DOCUMENTED: ICD-10-PCS | Mod: CPTII,S$GLB,, | Performed by: NURSE PRACTITIONER

## 2022-06-15 PROCEDURE — 99999 PR PBB SHADOW E&M-EST. PATIENT-LVL III: CPT | Mod: PBBFAC,,, | Performed by: NURSE PRACTITIONER

## 2022-06-15 PROCEDURE — 73080 X-RAY EXAM OF ELBOW: CPT | Mod: 26,LT,, | Performed by: RADIOLOGY

## 2022-06-15 PROCEDURE — 1159F MED LIST DOCD IN RCRD: CPT | Mod: CPTII,S$GLB,, | Performed by: NURSE PRACTITIONER

## 2022-06-15 PROCEDURE — 1159F PR MEDICATION LIST DOCUMENTED IN MEDICAL RECORD: ICD-10-PCS | Mod: CPTII,S$GLB,, | Performed by: NURSE PRACTITIONER

## 2022-06-15 RX ADMIN — TRIAMCINOLONE ACETONIDE 40 MG: 40 INJECTION, SUSPENSION INTRA-ARTICULAR; INTRAMUSCULAR at 01:06

## 2022-06-19 RX ORDER — TRIAMCINOLONE ACETONIDE 40 MG/ML
40 INJECTION, SUSPENSION INTRA-ARTICULAR; INTRAMUSCULAR
Status: COMPLETED | OUTPATIENT
Start: 2022-06-15 | End: 2022-06-15

## 2022-06-20 NOTE — PROGRESS NOTES
CC: left elbow swelling    HPI:Pt typically sees me for knee pain, but today she comes in for left elbow swelling. The swelling started after she fell in the street a few weeks ago. There wasn't pain or swelling initially and there wasn't any open cut or injury. There isn't a lot of pain, but the swelling is annoying and there is pain when she hits it on things. She was seen in urgent care for the elbow swelling and had an aspiration, but she didn't have a cortisone injection at that time. There is no limitation of movement in the elbow.     ROS:  C/o left elbow swelling  Denies erythema or warmth  Denies fever    Left elbow exam  Full range of motion without pain  + swelling of the bursa  No warmth or erythema    Assessment  Left elbow bursitis    Plan  Aspiration of the left elbow today followed by cortisone injection with compression for 24 hours  Continue mobic as prescribed to help with the swelling        Pre-operative Diagnosis: Left elbow bursitis    Post-operative Diagnosis: same    Indications: Left elbow swelling    Anesthesia: none    Procedure Details     Verbal consent was obtained for the procedure.An 18 gauge needle was inserted into the superior aspect of the left elbow bursa from a lateral approach. 40 ml of serosanguinous fluid was removed from the joint and discarded. 1% lidocaine 2 ml and 40mg of kenalog was then injected into the joint through the same needle. The needle was removed and the area cleansed and dressed.    Complications:  None; patient tolerated the procedure well.

## 2022-06-27 DIAGNOSIS — Z12.11 SPECIAL SCREENING FOR MALIGNANT NEOPLASMS, COLON: Primary | ICD-10-CM

## 2022-06-27 DIAGNOSIS — Z12.11 COLON CANCER SCREENING: Primary | ICD-10-CM

## 2022-06-27 RX ORDER — POLYETHYLENE GLYCOL 3350, SODIUM SULFATE ANHYDROUS, SODIUM BICARBONATE, SODIUM CHLORIDE, POTASSIUM CHLORIDE 236; 22.74; 6.74; 5.86; 2.97 G/4L; G/4L; G/4L; G/4L; G/4L
4 POWDER, FOR SOLUTION ORAL ONCE
Qty: 4000 ML | Refills: 0 | Status: SHIPPED | OUTPATIENT
Start: 2022-06-27 | End: 2022-06-27

## 2022-06-29 ENCOUNTER — TELEPHONE (OUTPATIENT)
Dept: OBSTETRICS AND GYNECOLOGY | Facility: CLINIC | Age: 67
End: 2022-06-29
Payer: COMMERCIAL

## 2022-06-29 DIAGNOSIS — Z12.31 SCREENING MAMMOGRAM, ENCOUNTER FOR: Primary | ICD-10-CM

## 2022-06-29 NOTE — TELEPHONE ENCOUNTER
----- Message from Emilee Casanova sent at 6/29/2022  3:51 PM CDT -----  Type:  Patient Returning Call    Who Called:pt     Does the patient know what this is regarding?:mammogram   Would the patient rather a call back or a response via [facility]? Call back   Best Call Back Number:[phone]  Additional Information: pt would like to schedule mammo and needs orders put in

## 2022-07-01 ENCOUNTER — PATIENT MESSAGE (OUTPATIENT)
Dept: INTERNAL MEDICINE | Facility: CLINIC | Age: 67
End: 2022-07-01
Payer: COMMERCIAL

## 2022-07-01 DIAGNOSIS — Z00.00 ANNUAL PHYSICAL EXAM: Primary | ICD-10-CM

## 2022-07-22 ENCOUNTER — ANESTHESIA EVENT (OUTPATIENT)
Dept: ENDOSCOPY | Facility: HOSPITAL | Age: 67
End: 2022-07-22
Payer: COMMERCIAL

## 2022-07-22 ENCOUNTER — HOSPITAL ENCOUNTER (OUTPATIENT)
Facility: HOSPITAL | Age: 67
Discharge: HOME | End: 2022-07-22
Attending: INTERNAL MEDICINE | Admitting: INTERNAL MEDICINE
Payer: COMMERCIAL

## 2022-07-22 ENCOUNTER — ANESTHESIA (OUTPATIENT)
Dept: ENDOSCOPY | Facility: HOSPITAL | Age: 67
End: 2022-07-22
Payer: COMMERCIAL

## 2022-07-22 VITALS
RESPIRATION RATE: 18 BRPM | WEIGHT: 172 LBS | HEART RATE: 67 BPM | HEIGHT: 63 IN | DIASTOLIC BLOOD PRESSURE: 88 MMHG | BODY MASS INDEX: 30.48 KG/M2 | OXYGEN SATURATION: 99 % | TEMPERATURE: 98 F | SYSTOLIC BLOOD PRESSURE: 136 MMHG

## 2022-07-22 DIAGNOSIS — Z12.11 SPECIAL SCREENING FOR MALIGNANT NEOPLASMS, COLON: Primary | ICD-10-CM

## 2022-07-22 DIAGNOSIS — Z12.11 COLON CANCER SCREENING: ICD-10-CM

## 2022-07-22 PROCEDURE — 63600175 PHARM REV CODE 636 W HCPCS: Performed by: NURSE ANESTHETIST, CERTIFIED REGISTERED

## 2022-07-22 PROCEDURE — 88305 TISSUE EXAM BY PATHOLOGIST: CPT | Mod: 26,,, | Performed by: PATHOLOGY

## 2022-07-22 PROCEDURE — 25000003 PHARM REV CODE 250: Performed by: INTERNAL MEDICINE

## 2022-07-22 PROCEDURE — 45385 COLONOSCOPY W/LESION REMOVAL: CPT | Mod: PT | Performed by: INTERNAL MEDICINE

## 2022-07-22 PROCEDURE — 45380 COLONOSCOPY AND BIOPSY: CPT | Mod: PT,59,, | Performed by: INTERNAL MEDICINE

## 2022-07-22 PROCEDURE — 37000008 HC ANESTHESIA 1ST 15 MINUTES: Performed by: INTERNAL MEDICINE

## 2022-07-22 PROCEDURE — 27201089 HC SNARE, DISP (ANY): Performed by: INTERNAL MEDICINE

## 2022-07-22 PROCEDURE — 45385 COLONOSCOPY W/LESION REMOVAL: CPT | Mod: PT,,, | Performed by: INTERNAL MEDICINE

## 2022-07-22 PROCEDURE — 25000003 PHARM REV CODE 250: Performed by: NURSE ANESTHETIST, CERTIFIED REGISTERED

## 2022-07-22 PROCEDURE — 88305 TISSUE EXAM BY PATHOLOGIST: CPT | Mod: 59 | Performed by: PATHOLOGY

## 2022-07-22 PROCEDURE — 37000009 HC ANESTHESIA EA ADD 15 MINS: Performed by: INTERNAL MEDICINE

## 2022-07-22 PROCEDURE — 45385 PR COLONOSCOPY,REMV LESN,SNARE: ICD-10-PCS | Mod: PT,,, | Performed by: INTERNAL MEDICINE

## 2022-07-22 PROCEDURE — 45380 COLONOSCOPY AND BIOPSY: CPT | Mod: PT,59 | Performed by: INTERNAL MEDICINE

## 2022-07-22 PROCEDURE — E9220 PRA ENDO ANESTHESIA: HCPCS | Mod: 33,,, | Performed by: NURSE ANESTHETIST, CERTIFIED REGISTERED

## 2022-07-22 PROCEDURE — E9220 PRA ENDO ANESTHESIA: ICD-10-PCS | Mod: 33,,, | Performed by: NURSE ANESTHETIST, CERTIFIED REGISTERED

## 2022-07-22 PROCEDURE — 45380 PR COLONOSCOPY,BIOPSY: ICD-10-PCS | Mod: PT,59,, | Performed by: INTERNAL MEDICINE

## 2022-07-22 PROCEDURE — 27201012 HC FORCEPS, HOT/COLD, DISP: Performed by: INTERNAL MEDICINE

## 2022-07-22 PROCEDURE — 88305 TISSUE EXAM BY PATHOLOGIST: ICD-10-PCS | Mod: 26,,, | Performed by: PATHOLOGY

## 2022-07-22 RX ORDER — LIDOCAINE HYDROCHLORIDE 20 MG/ML
INJECTION INTRAVENOUS
Status: DISCONTINUED | OUTPATIENT
Start: 2022-07-22 | End: 2022-07-22

## 2022-07-22 RX ORDER — PROPOFOL 10 MG/ML
VIAL (ML) INTRAVENOUS CONTINUOUS PRN
Status: DISCONTINUED | OUTPATIENT
Start: 2022-07-22 | End: 2022-07-22

## 2022-07-22 RX ORDER — PROPOFOL 10 MG/ML
VIAL (ML) INTRAVENOUS
Status: DISCONTINUED | OUTPATIENT
Start: 2022-07-22 | End: 2022-07-22

## 2022-07-22 RX ORDER — SODIUM CHLORIDE 9 MG/ML
INJECTION, SOLUTION INTRAVENOUS CONTINUOUS
Status: DISCONTINUED | OUTPATIENT
Start: 2022-07-22 | End: 2022-07-22 | Stop reason: HOSPADM

## 2022-07-22 RX ADMIN — Medication 150 MCG/KG/MIN: at 07:07

## 2022-07-22 RX ADMIN — PROPOFOL 100 MG: 10 INJECTION, EMULSION INTRAVENOUS at 07:07

## 2022-07-22 RX ADMIN — LIDOCAINE HYDROCHLORIDE 80 MG: 20 INJECTION, SOLUTION INTRAVENOUS at 07:07

## 2022-07-22 RX ADMIN — SODIUM CHLORIDE: 0.9 INJECTION, SOLUTION INTRAVENOUS at 07:07

## 2022-07-22 NOTE — ANESTHESIA PREPROCEDURE EVALUATION
[facility]  Anesthesia Pre-Operative Evaluation       Patient Name: Marti Coley  YOB: 1955  MRN: [id number]  [facility]: [id number]      Code Status: No Order   Date of Procedure: 7/22/2022  Anesthesia: General Procedure: Procedure(s) (LRB):  COLONOSCOPY (N/A)  Pre-Operative Diagnosis: Colon cancer screening [Z12.11]  Proceduralist: Surgeon(s) and Role:     * Arnaldo Gallagher MD - Primary        SUBJECTIVE:   Marti Coley is a 66 y.o. female who  has a past medical history of Allergy, Breast cancer (2008), Cancer (4/2008), Genetic testing (4/28/2008), HTN (hypertension), Hyperlipidemia, Invasive lobular carcinoma of breast, stage 1 (7/11/2012), and Osteoarthritis. No notes on file    Revised Cardiac Risk Index for Pre-Operative Risk Yes +1, No 0   1. High-risk surgery: Intraperitoneal; intrathoracic; suprainguinal vascular    0    2. History of ischemic heart disease:  History of myocardial infarction (MI); history of positive exercise test; current chest paint considered due to myocardial ischemia; use of nitrate therapy or ECG with pathological Q waves    0    3. History of congestive heart failure:  Pulmonary edema, bilateral rales or S3 gallop; paroxysmal nocturnal dyspnea; chest x-ray (CXR) showing pulmonary vascular redistribution    0   4. History of cerebrovascular disease: Prior transient ischemic attack (TIA) or stroke    0    5.  Pre-operative treatment with insulin  0   6.  Creatinine >2   Lab Results   Component Value Date    CREATININE 0.67 07/07/2021     0      Revised cardiac risk index (RCRI) score is 0.    she has a current medication list which includes the following long-term medication(s): amlodipine and clobetasol 0.05%.   ALLERGIES:     Review of patient's allergies indicates:   Allergen Reactions    Ciprofloxacin Rash    Penicillins Rash     LDA:      Lines/Drains/Airways     None               MEDICATIONS:     Antibiotics (From admission, onward)            None         VTE Risk Mitigation (From admission, onward)    None        Current Facility-Administered Medications   Medication Dose Route Frequency Provider Last Rate Last Admin    0.9%  NaCl infusion   Intravenous Continuous Arnaldo Gallagher MD              History:   There are no hospital problems to display for this patient.    Surgical History:    has a past surgical history that includes Endometrial biopsy (5/27/11); Breast surgery (12/2008); and Breast lumpectomy (Left, 2008).   Social History:    reports being sexually active and has had partner(s) who are male. She reports using the following method of birth control/protection: Post-menopausal.  reports that she has never smoked. She has never used smokeless tobacco. She reports that she does not drink alcohol and does not use drugs.     OBJECTIVE:     Vital Signs (Most Recent):    Vital Signs Range (Last 24H):          There is no height or weight on file to calculate BMI.   Wt Readings from Last 4 Encounters:   08/14/21 76.7 kg (169 lb)   07/26/21 70 kg (154 lb 5.2 oz)   07/12/21 77.7 kg (171 lb 4.8 oz)   07/16/20 79.8 kg (175 lb 14.8 oz)     Significant Labs:  Lab Results   Component Value Date    WBC 7.8 07/07/2021    HGB 13.9 07/07/2021    HCT 41.9 07/07/2021     07/07/2021     07/07/2021    K 4.2 07/07/2021     07/07/2021    CREATININE 0.67 07/07/2021    BUN 14 07/07/2021    CO2 30 07/07/2021    GLU 87 07/07/2021    CALCIUM 8.9 07/07/2021    ALKPHOS 70 07/01/2020    ALT 16 07/07/2021    AST 13 07/07/2021    ALBUMIN 4.0 07/07/2021    HGBA1C 5.6 07/07/2021     10/11/2017    HCGQUANT <2.0 05/16/2008     Patient's last menstrual period was 03/01/2008 (approximate).  No results found for this or any previous visit (from the past 72 hour(s)).    EKG:   Results for orders placed or performed during the hospital encounter of 07/28/21   EKG 12-lead    Collection Time: 07/28/21 10:28 AM    Narrative    Test Reason : I10,    Vent. Rate : 078 BPM      Atrial Rate : 078 BPM     P-R Int : 154 ms          QRS Dur : 100 ms      QT Int : 372 ms       P-R-T Axes : 059 -31 024 degrees     QTc Int : 424 ms    Normal sinus rhythm  Left axis deviation  Minimal voltage criteria for LVH, may be normal variant  Abnormal ECG  When compared with ECG of 03-MAR-2015 15:52,  No significant change was found  Confirmed by VIKTOR DUKE MD [id number]) on 7/28/2021 11:03:50 AM    Referred By: FRAN ZAVALA           Confirmed By:VITKOR DUKE MD     TTE:  No results found for this or any previous visit.  No results found for: EF   No results found for this or any previous visit.  CHINO:  No results found for this or any previous visit.  Stress Test:  No results found for this or any previous visit.     LHC:  No results found for this or any previous visit.     PFT:  No results found for: FEV1, FVC, KZT2YTI, TLC, DLCO   ASSESSMENT/PLAN:     Pre-op Assessment    I have reviewed the Patient Summary Reports.     I have reviewed the Nursing Notes.    I have reviewed the Medications.     Review of Systems  Anesthesia Hx:  No problems with previous Anesthesia    Hematology/Oncology:  Hematology Normal   Oncology Normal     EENT/Dental:EENT/Dental Normal   Cardiovascular:   Exercise tolerance: good Hypertension    Pulmonary:  Pulmonary Normal    Renal/:  Renal/ Normal     Hepatic/GI:  Hepatic/GI Normal    Musculoskeletal:   Arthritis     Neurological:  Neurology Normal    Endocrine:  Endocrine Normal    Dermatological:  Skin Normal    Psych:  Psychiatric Normal           Physical Exam  General: Well nourished    Airway:  Mallampati: III / II  Mouth Opening: Normal  TM Distance: Normal  Tongue: Normal  Neck ROM: Normal ROM    Dental:  Intact        Anesthesia Plan  Type of Anesthesia, risks & benefits discussed:    Anesthesia Type: Gen ETT, Gen Natural Airway  Intra-op Monitoring Plan: Standard ASA Monitors  Post Op Pain Control Plan: multimodal analgesia and IV/PO Opioids PRN  Induction:   IV  Airway Plan: Direct and Video, Post-Induction  Informed Consent: Informed consent signed with the Patient and all parties understand the risks and agree with anesthesia plan.  All questions answered.   ASA Score: 2  Day of Surgery Review of History & Physical: H&P completed by Anesthesiologist.  Anesthesia Plan Notes: Chart reviewed, patient interviewed and examined.  The anesthetic plan was explained.  Risks, benefits, and alternatives were discussed. Questions were answered and the consent was signed.        CHRISTELLE Greenberg M.D.         Ready For Surgery From Anesthesia Perspective.     .

## 2022-07-22 NOTE — H&P
Short Stay Endoscopy History and Physical    PCP - Lucero Mccrary MD    Procedure - Colonoscopy  ASA - per anesthesia  Mallampati - per anesthesia  History of Anesthesia problems - no  Family history Anesthesia problems - no   Plan of anesthesia - General    HPI:  This is a 66 y.o. female here for evaluation of : asymptomatic screening exam      ROS:  Constitutional: No fevers, chills, No weight loss  CV: No chest pain  Pulm: No cough, No shortness of breath  GI: see HPI  Derm: No rash    Medical History:  has a past medical history of Allergy, Breast cancer (2008), Cancer (4/2008), Genetic testing (4/28/2008), HTN (hypertension), Hyperlipidemia, Invasive lobular carcinoma of breast, stage 1 (7/11/2012), and Osteoarthritis.    Surgical History:  has a past surgical history that includes Endometrial biopsy (5/27/11); Breast surgery (12/2008); and Breast lumpectomy (Left, 2008).    Family History: family history includes Aneurysm in her father; Breast cancer (age of onset: 32) in her sister; Breast cancer (age of onset: 80) in her mother and paternal grandmother; COPD in her father; Cancer in her brother, father, and sister; Endometrial cancer in her sister; Heart disease in her brother and father; Hyperlipidemia in her mother; Hypertension in her mother; Melanoma in her brother; Uterine cancer (age of onset: 55) in her sister.. Otherwise no colon cancer, inflammatory bowel disease, or GI malignancies.    Social History:  reports that she has never smoked. She has never used smokeless tobacco. She reports that she does not drink alcohol and does not use drugs.    Review of patient's allergies indicates:   Allergen Reactions    Ciprofloxacin Rash    Penicillins Rash       Medications:   Medications Prior to Admission   Medication Sig Dispense Refill Last Dose    amLODIPine (NORVASC) 5 MG tablet Take 1 tablet (5 mg total) by mouth once daily. 90 tablet 3 Past Week at Unknown time    ergocalciferol, vitamin  D2, (VITAMIN D ORAL)    Past Week at Unknown time    omega-3 fatty acids/fish oil (FISH OIL-OMEGA-3 FATTY ACIDS) 300-1,000 mg capsule Take 1 capsule by mouth once daily.   Past Week at Unknown time    celecoxib (CELEBREX) 200 MG capsule TAKE 1 CAPSULE BY MOUTH TWICE A DAY 90 capsule 1 More than a month at Unknown time    clobetasol 0.05% (TEMOVATE) 0.05 % Oint Apply topically 2 (two) times daily. 30 g 6 More than a month at Unknown time    clotrimazole-betamethasone 1-0.05% (LOTRISONE) cream Apply topically 2 (two) times daily. Apply to affected area 15 g 6 More than a month at Unknown time         Physical Exam:    Vital Signs:   Vitals:    07/22/22 0732   BP: (!) 138/91   Pulse:    Resp:    Temp:        General Appearance: Well appearing in no acute distress  Eyes:    No scleral icterus  ENT: Neck supple, Lips, mucosa, and tongue normal; teeth and gums normal  Abdomen: Soft, non tender, non distended with positive bowel sounds. No hepatosplenomegaly, ascites, or mass.  Extremities: 2+ pulses, no clubbing, cyanosis or edema  Skin: No rash      Labs:  Lab Results   Component Value Date    WBC 7.8 07/07/2021    HGB 13.9 07/07/2021    HCT 41.9 07/07/2021     07/07/2021    CHOL 239 (H) 07/07/2021    TRIG 91 07/07/2021    HDL 59 07/07/2021    ALT 16 07/07/2021    AST 13 07/07/2021     07/07/2021    K 4.2 07/07/2021     07/07/2021    CREATININE 0.67 07/07/2021    BUN 14 07/07/2021    CO2 30 07/07/2021    TSH 1.50 07/07/2021    HGBA1C 5.6 07/07/2021       I have explained the risks and benefits of endoscopy procedures to the patient including but not limited to bleeding, perforation, infection, and death.  The patient was asked if they understand and allowed to ask any further questions to their satisfaction.    Arnaldo Gallagher MD

## 2022-07-22 NOTE — TRANSFER OF CARE
"Anesthesia Transfer of Care Note    Patient: Marti Coley    Procedure(s) Performed: Procedure(s) (LRB):  COLONOSCOPY (N/A)    Patient location: GI    Anesthesia Type: general    Transport from OR: Transported from OR on 6-10 L/min O2 by face mask with adequate spontaneous ventilation    Post pain: adequate analgesia    Post assessment: no apparent anesthetic complications and tolerated procedure well    Post vital signs: stable    Level of consciousness: awake and alert    Nausea/Vomiting: no nausea/vomiting    Complications: none    Transfer of care protocol was followed      Last vitals:   Visit Vitals  BP (!) 99/57 (BP Location: Right arm, Patient Position: Lying)   Pulse 78   Temp 36.8 °C (98.2 °F) (Skin)   Resp 18   Ht 5' 3" (1.6 m)   Wt 78 kg (172 lb)   LMP 03/01/2008 (Approximate)   SpO2 98%   Breastfeeding No   BMI 30.47 kg/m²     "

## 2022-07-22 NOTE — PROVATION PATIENT INSTRUCTIONS
Discharge Summary/Instructions after an Endoscopic Procedure  Patient Name: Marti Coley  Patient MRN: [id number]  Patient YOB: 1955 Friday, July 22, 2022  Arnaldo Gallagher MD  Dear patient,  As a result of recent federal legislation (The Federal Cures Act), you may   receive lab or pathology results from your procedure in your [facility]   account before your physician is able to contact you. Your physician or   their representative will relay the results to you with their   recommendations at their soonest availability.  Thank you,  RESTRICTIONS:  During your procedure today, you received medications for sedation.  These   medications may affect your judgment, balance and coordination.  Therefore,   for 24 hours, you have the following restrictions:   - DO NOT drive a car, operate machinery, make legal/financial decisions,   sign important papers or drink alcohol.    ACTIVITY:  Today: no heavy lifting, straining or running due to procedural   sedation/anesthesia.  The following day: return to full activity including work.  DIET:  Eat and drink normally unless instructed otherwise.     TREATMENT FOR COMMON SIDE EFFECTS:  - Mild abdominal pain, nausea, belching, bloating or excessive gas:  rest,   eat lightly and use a heating pad.  - Sore Throat: treat with throat lozenges and/or gargle with warm salt   water.  - Because air was used during the procedure, expelling large amounts of air   from your rectum or belching is normal.  - If a bowel prep was taken, you may not have a bowel movement for 1-3 days.    This is normal.  SYMPTOMS TO WATCH FOR AND REPORT TO YOUR PHYSICIAN:  1. Abdominal pain or bloating, other than gas cramps.  2. Chest pain.  3. Back pain.  4. Signs of infection such as: chills or fever occurring within 24 hours   after the procedure.  5. Rectal bleeding, which would show as bright red, maroon, or black stools.   (A tablespoon of blood from the rectum is not serious, especially if    hemorrhoids are present.)  6. Vomiting.  7. Weakness or dizziness.  GO DIRECTLY TO THE NEAREST EMERGENCY ROOM IF YOU HAVE ANY OF THE FOLLOWING:      Difficulty breathing              Chills and/or fever over 101 F   Persistent vomiting and/or vomiting blood   Severe abdominal pain   Severe chest pain   Black, tarry stools   Bleeding- more than one tablespoon   Any other symptom or condition that you feel may need urgent attention  Your doctor recommends these additional instructions:  If any biopsies were taken, your doctors clinic will contact you in 1 to 2   weeks with any results.  - Patient has a contact number available for emergencies.  The signs and   symptoms of potential delayed complications were discussed with the   patient.  Return to normal activities tomorrow.  Written discharge   instructions were provided to the patient.   - Discharge patient to home.   - Await pathology results.   - Repeat colonoscopy in 3 years for surveillance.   - The findings and recommendations were discussed with the designated   responsible adult.  For questions, problems or results please call your physician - Arnaldo Gallagher MD at Work:  [phone].  [facility] [place], EMERGENCY ROOM PHONE NUMBER: [phone]  IF A COMPLICATION OR EMERGENCY SITUATION ARISES AND YOU ARE UNABLE TO REACH   YOUR PHYSICIAN - GO DIRECTLY TO THE EMERGENCY ROOM.  Arnaldo Gallagher MD  7/22/2022 8:10:59 AM  This report has been verified and signed electronically.  Dear patient,  As a result of recent federal legislation (The Federal Cures Act), you may   receive lab or pathology results from your procedure in your [facility]   account before your physician is able to contact you. Your physician or   their representative will relay the results to you with their   recommendations at their soonest availability.  Thank you,  PROVATION

## 2022-07-22 NOTE — ANESTHESIA POSTPROCEDURE EVALUATION
Anesthesia Post Evaluation    Patient: Marti Coley    Procedure(s) Performed: Procedure(s) (LRB):  COLONOSCOPY (N/A)    Final Anesthesia Type: general      Patient location during evaluation: PACU  Patient participation: Yes- Able to Participate  Level of consciousness: awake and alert  Post-procedure vital signs: reviewed and stable  Pain management: adequate  Airway patency: patent    PONV status at discharge: No PONV  Anesthetic complications: no      Cardiovascular status: blood pressure returned to baseline  Respiratory status: unassisted  Hydration status: euvolemic  Follow-up not needed.          Vitals Value Taken Time   /88 07/22/22 0847   Temp 36.8 °C (98.2 °F) 07/22/22 0815   Pulse 67 07/22/22 0847   Resp 18 07/22/22 0847   SpO2 99 % 07/22/22 0847         No case tracking events are documented in the log.      Pain/Zackery Score: Zackery Score: 10 (7/22/2022  8:48 AM)

## 2022-08-01 LAB
FINAL PATHOLOGIC DIAGNOSIS: NORMAL
Lab: NORMAL

## 2022-08-03 ENCOUNTER — PATIENT MESSAGE (OUTPATIENT)
Dept: ORTHOPEDICS | Facility: CLINIC | Age: 67
End: 2022-08-03
Payer: COMMERCIAL

## 2022-08-06 LAB
25(OH)D3 SERPL-MCNC: 37 NG/ML (ref 30–100)
ALBUMIN SERPL-MCNC: 4 G/DL (ref 3.6–5.1)
ALBUMIN/GLOB SERPL: 1.7 (CALC) (ref 1–2.5)
ALP SERPL-CCNC: 61 U/L (ref 37–153)
ALT SERPL-CCNC: 13 U/L (ref 6–29)
AST SERPL-CCNC: 15 U/L (ref 10–35)
BASOPHILS # BLD AUTO: 20 CELLS/UL (ref 0–200)
BASOPHILS NFR BLD AUTO: 0.4 %
BILIRUB SERPL-MCNC: 0.6 MG/DL (ref 0.2–1.2)
BUN SERPL-MCNC: 12 MG/DL (ref 7–25)
BUN/CREAT SERPL: NORMAL (CALC) (ref 6–22)
CALCIUM SERPL-MCNC: 9 MG/DL (ref 8.6–10.4)
CHLORIDE SERPL-SCNC: 106 MMOL/L (ref 98–110)
CHOLEST SERPL-MCNC: 243 MG/DL
CHOLEST/HDLC SERPL: 5.1 (CALC)
CO2 SERPL-SCNC: 30 MMOL/L (ref 20–32)
CREAT SERPL-MCNC: 0.6 MG/DL (ref 0.5–1.05)
EGFR: 100 ML/MIN/1.73M2
EOSINOPHIL # BLD AUTO: 80 CELLS/UL (ref 15–500)
EOSINOPHIL NFR BLD AUTO: 1.6 %
ERYTHROCYTE [DISTWIDTH] IN BLOOD BY AUTOMATED COUNT: 13.5 % (ref 11–15)
GLOBULIN SER CALC-MCNC: 2.4 G/DL (CALC) (ref 1.9–3.7)
GLUCOSE SERPL-MCNC: 98 MG/DL (ref 65–99)
HBA1C MFR BLD: 5.3 % OF TOTAL HGB
HCT VFR BLD AUTO: 42.8 % (ref 35–45)
HDLC SERPL-MCNC: 48 MG/DL
HGB BLD-MCNC: 14 G/DL (ref 11.7–15.5)
LDLC SERPL CALC-MCNC: 157 MG/DL (CALC)
LYMPHOCYTES # BLD AUTO: 1605 CELLS/UL (ref 850–3900)
LYMPHOCYTES NFR BLD AUTO: 32.1 %
MCH RBC QN AUTO: 28.4 PG (ref 27–33)
MCHC RBC AUTO-ENTMCNC: 32.7 G/DL (ref 32–36)
MCV RBC AUTO: 86.8 FL (ref 80–100)
MONOCYTES # BLD AUTO: 320 CELLS/UL (ref 200–950)
MONOCYTES NFR BLD AUTO: 6.4 %
NEUTROPHILS # BLD AUTO: 2975 CELLS/UL (ref 1500–7800)
NEUTROPHILS NFR BLD AUTO: 59.5 %
NONHDLC SERPL-MCNC: 195 MG/DL (CALC)
PLATELET # BLD AUTO: 223 THOUSAND/UL (ref 140–400)
PMV BLD REES-ECKER: 9.9 FL (ref 7.5–12.5)
POTASSIUM SERPL-SCNC: 4.4 MMOL/L (ref 3.5–5.3)
PROT SERPL-MCNC: 6.4 G/DL (ref 6.1–8.1)
RBC # BLD AUTO: 4.93 MILLION/UL (ref 3.8–5.1)
SODIUM SERPL-SCNC: 142 MMOL/L (ref 135–146)
TRIGL SERPL-MCNC: 211 MG/DL
TSH SERPL-ACNC: 2.58 MIU/L (ref 0.4–4.5)
WBC # BLD AUTO: 5 THOUSAND/UL (ref 3.8–10.8)

## 2022-08-10 ENCOUNTER — OFFICE VISIT (OUTPATIENT)
Dept: INTERNAL MEDICINE | Facility: CLINIC | Age: 67
End: 2022-08-10
Payer: COMMERCIAL

## 2022-08-10 VITALS
HEIGHT: 63 IN | BODY MASS INDEX: 30.46 KG/M2 | DIASTOLIC BLOOD PRESSURE: 70 MMHG | HEART RATE: 90 BPM | WEIGHT: 171.94 LBS | SYSTOLIC BLOOD PRESSURE: 125 MMHG | TEMPERATURE: 99 F | OXYGEN SATURATION: 99 %

## 2022-08-10 DIAGNOSIS — Z86.010 HX OF COLONIC POLYPS: ICD-10-CM

## 2022-08-10 DIAGNOSIS — Z00.00 ANNUAL PHYSICAL EXAM: Primary | ICD-10-CM

## 2022-08-10 DIAGNOSIS — Z85.3 HISTORY OF BREAST CANCER: ICD-10-CM

## 2022-08-10 DIAGNOSIS — Z78.0 POSTMENOPAUSAL: ICD-10-CM

## 2022-08-10 DIAGNOSIS — S92.355S CLOSED NONDISPLACED FRACTURE OF FIFTH METATARSAL BONE OF LEFT FOOT, SEQUELA: ICD-10-CM

## 2022-08-10 DIAGNOSIS — I10 PRIMARY HYPERTENSION: ICD-10-CM

## 2022-08-10 DIAGNOSIS — E78.2 ELEVATED TRIGLYCERIDES WITH HIGH CHOLESTEROL: ICD-10-CM

## 2022-08-10 DIAGNOSIS — H26.9 CATARACT OF BOTH EYES, UNSPECIFIED CATARACT TYPE: ICD-10-CM

## 2022-08-10 PROCEDURE — 3078F DIAST BP <80 MM HG: CPT | Mod: CPTII,S$GLB,, | Performed by: INTERNAL MEDICINE

## 2022-08-10 PROCEDURE — 99396 PREV VISIT EST AGE 40-64: CPT | Mod: S$GLB,,, | Performed by: INTERNAL MEDICINE

## 2022-08-10 PROCEDURE — 1159F PR MEDICATION LIST DOCUMENTED IN MEDICAL RECORD: ICD-10-PCS | Mod: CPTII,S$GLB,, | Performed by: INTERNAL MEDICINE

## 2022-08-10 PROCEDURE — 1160F PR REVIEW ALL MEDS BY PRESCRIBER/CLIN PHARMACIST DOCUMENTED: ICD-10-PCS | Mod: CPTII,S$GLB,, | Performed by: INTERNAL MEDICINE

## 2022-08-10 PROCEDURE — 3008F BODY MASS INDEX DOCD: CPT | Mod: CPTII,S$GLB,, | Performed by: INTERNAL MEDICINE

## 2022-08-10 PROCEDURE — 1159F MED LIST DOCD IN RCRD: CPT | Mod: CPTII,S$GLB,, | Performed by: INTERNAL MEDICINE

## 2022-08-10 PROCEDURE — 3074F SYST BP LT 130 MM HG: CPT | Mod: CPTII,S$GLB,, | Performed by: INTERNAL MEDICINE

## 2022-08-10 PROCEDURE — 1160F RVW MEDS BY RX/DR IN RCRD: CPT | Mod: CPTII,S$GLB,, | Performed by: INTERNAL MEDICINE

## 2022-08-10 PROCEDURE — 99999 PR PBB SHADOW E&M-EST. PATIENT-LVL V: ICD-10-PCS | Mod: PBBFAC,,, | Performed by: INTERNAL MEDICINE

## 2022-08-10 PROCEDURE — 3044F PR MOST RECENT HEMOGLOBIN A1C LEVEL <7.0%: ICD-10-PCS | Mod: CPTII,S$GLB,, | Performed by: INTERNAL MEDICINE

## 2022-08-10 PROCEDURE — 3044F HG A1C LEVEL LT 7.0%: CPT | Mod: CPTII,S$GLB,, | Performed by: INTERNAL MEDICINE

## 2022-08-10 PROCEDURE — 3078F PR MOST RECENT DIASTOLIC BLOOD PRESSURE < 80 MM HG: ICD-10-PCS | Mod: CPTII,S$GLB,, | Performed by: INTERNAL MEDICINE

## 2022-08-10 PROCEDURE — 99396 PR PREVENTIVE VISIT,EST,40-64: ICD-10-PCS | Mod: S$GLB,,, | Performed by: INTERNAL MEDICINE

## 2022-08-10 PROCEDURE — 99999 PR PBB SHADOW E&M-EST. PATIENT-LVL V: CPT | Mod: PBBFAC,,, | Performed by: INTERNAL MEDICINE

## 2022-08-10 PROCEDURE — 3074F PR MOST RECENT SYSTOLIC BLOOD PRESSURE < 130 MM HG: ICD-10-PCS | Mod: CPTII,S$GLB,, | Performed by: INTERNAL MEDICINE

## 2022-08-10 PROCEDURE — 3008F PR BODY MASS INDEX (BMI) DOCUMENTED: ICD-10-PCS | Mod: CPTII,S$GLB,, | Performed by: INTERNAL MEDICINE

## 2022-08-10 RX ORDER — AMLODIPINE BESYLATE 5 MG/1
5 TABLET ORAL DAILY
Qty: 90 TABLET | Refills: 3 | Status: SHIPPED | OUTPATIENT
Start: 2022-08-10 | End: 2023-08-31 | Stop reason: SDUPTHER

## 2022-08-10 NOTE — PROGRESS NOTES
"Subjective:       Patient ID: Marti Coley is a 66 y.o. female.    Chief Complaint: Annual Exam  This is a 66-year-old who presents today for physical.  Patient reports in general has been doing well although she did have a fall recently when she tripped while walking and fractured her left foot she went to urgent care and reports going to be placed and now is going to be following up with orthopedist for evaluation she also occasionally will get some discomfort in her left elbow patient reports she has had a traumatic bursitis before that was drained but has some discomfort from her more recent fall but it does seem to be improving.  She reports some increased stressors with her has been going through some prostate test more recently.  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  She was hoping to travel but then injured her foot.  She reports she does try to stay active with walking and doing that before her injury she feels that her diet in general is fairly healthy.  She has also had some issues with her vision and reports that she is planning cataract surgery in the next few weeks    HPI  Review of Systems   HENT:        Stable hair thinning    Eyes:        Vision changes planning catract surgery    Cardiovascular: Negative for chest pain and leg swelling.   Musculoskeletal:        Fracture left foot     Elbow injured as well        Objective:     Blood pressure 125/70, pulse 90, temperature 98.8 °F (37.1 °C), height 5' 3" (1.6 m), weight 78 kg (171 lb 15.3 oz), last menstrual period 03/01/2008, SpO2 99 %.  waist ciurcumf 38 inches   Physical Exam  Constitutional:       General: She is not in acute distress.  HENT:      Head: Normocephalic.      Comments: Hair thinning   Eyes:      General: No scleral icterus.  Cardiovascular:      Rate and Rhythm: Normal rate and regular rhythm.      Heart sounds: Normal heart sounds. No murmur heard.    No friction rub. " No gallop.      Comments: Pt defers exam   Pulmonary:      Effort: Pulmonary effort is normal. No respiratory distress.      Breath sounds: Normal breath sounds.   Abdominal:      General: Bowel sounds are normal.      Palpations: Abdomen is soft. There is no mass.      Tenderness: There is no abdominal tenderness.   Musculoskeletal:      Cervical back: Neck supple.      Comments: Left foot boot  Some bruising localized swelling     Elbow some localized swelling forerm   Mild discomfort no erythema    Skin:     Findings: No erythema.   Neurological:      Mental Status: She is alert.         Assessment:       1. Annual physical exam    2. Primary hypertension    3. History of breast cancer    4. Hx of colonic polyps    5. Postmenopausal    6. Elevated triglycerides with high cholesterol    7. Closed nondisplaced fracture of fifth metatarsal bone of left foot, sequela    8. Cataract of both eyes, unspecified cataract type        Plan:       Marti was seen today for annual exam.    Diagnoses and all orders for this visit:    Annual physical exam      History of breast cancer  History of she has follow-up with her gynecologist planned and her mammogram scheduled    Hx of colonic polyps  History of she is up-to-date on her colonoscopy    Postmenopausal  -     DXA Bone Density Spine And Hip; Future    Essential hypertension  Discussed with patient will continue amlodipine blood pressure acceptable  Well controlled   -     amLODIPine (NORVASC) 5 MG tablet; Take 1 tablet (5 mg total) by mouth once daily.    Recent 5th metatarsal fracture patient went urgent care and wearing a boot she has discussed with her orthopedist and will keep her follow-up appointment as planned    Elevated triglycerides with high cholesterol  Discussed with patient lipids and cholesterol consider starting medication she will consider she has been working on exercise and dietary measures we discussed again start atorvastatin or rosuvastatin if  she would like to do so she will call    Cataracts  Patient's form was completed and given back to her  For surgery clearance for her upcoming cataract surgery     disucssed bone density and shingrix vaccine discussed     Follow up annually

## 2022-08-15 ENCOUNTER — OFFICE VISIT (OUTPATIENT)
Dept: OBSTETRICS AND GYNECOLOGY | Facility: CLINIC | Age: 67
End: 2022-08-15
Attending: OBSTETRICS & GYNECOLOGY
Payer: COMMERCIAL

## 2022-08-15 VITALS — BODY MASS INDEX: 30.46 KG/M2 | HEIGHT: 63 IN | DIASTOLIC BLOOD PRESSURE: 74 MMHG | SYSTOLIC BLOOD PRESSURE: 121 MMHG

## 2022-08-15 DIAGNOSIS — N95.2 POSTMENOPAUSAL ATROPHIC VAGINITIS: ICD-10-CM

## 2022-08-15 DIAGNOSIS — Z85.3 HISTORY OF BREAST CANCER: ICD-10-CM

## 2022-08-15 DIAGNOSIS — Z01.419 ENCOUNTER FOR GYNECOLOGICAL EXAMINATION WITHOUT ABNORMAL FINDING: Primary | ICD-10-CM

## 2022-08-15 DIAGNOSIS — Z12.4 PAP SMEAR FOR CERVICAL CANCER SCREENING: ICD-10-CM

## 2022-08-15 PROCEDURE — 99999 PR PBB SHADOW E&M-EST. PATIENT-LVL III: ICD-10-PCS | Mod: PBBFAC,,, | Performed by: OBSTETRICS & GYNECOLOGY

## 2022-08-15 PROCEDURE — 1160F PR REVIEW ALL MEDS BY PRESCRIBER/CLIN PHARMACIST DOCUMENTED: ICD-10-PCS | Mod: CPTII,S$GLB,, | Performed by: OBSTETRICS & GYNECOLOGY

## 2022-08-15 PROCEDURE — 3008F BODY MASS INDEX DOCD: CPT | Mod: CPTII,S$GLB,, | Performed by: OBSTETRICS & GYNECOLOGY

## 2022-08-15 PROCEDURE — 3008F PR BODY MASS INDEX (BMI) DOCUMENTED: ICD-10-PCS | Mod: CPTII,S$GLB,, | Performed by: OBSTETRICS & GYNECOLOGY

## 2022-08-15 PROCEDURE — 99999 PR PBB SHADOW E&M-EST. PATIENT-LVL III: CPT | Mod: PBBFAC,,, | Performed by: OBSTETRICS & GYNECOLOGY

## 2022-08-15 PROCEDURE — 1160F RVW MEDS BY RX/DR IN RCRD: CPT | Mod: CPTII,S$GLB,, | Performed by: OBSTETRICS & GYNECOLOGY

## 2022-08-15 PROCEDURE — 99396 PREV VISIT EST AGE 40-64: CPT | Mod: S$GLB,,, | Performed by: OBSTETRICS & GYNECOLOGY

## 2022-08-15 PROCEDURE — 3078F PR MOST RECENT DIASTOLIC BLOOD PRESSURE < 80 MM HG: ICD-10-PCS | Mod: CPTII,S$GLB,, | Performed by: OBSTETRICS & GYNECOLOGY

## 2022-08-15 PROCEDURE — 88175 CYTOPATH C/V AUTO FLUID REDO: CPT | Performed by: OBSTETRICS & GYNECOLOGY

## 2022-08-15 PROCEDURE — 3044F HG A1C LEVEL LT 7.0%: CPT | Mod: CPTII,S$GLB,, | Performed by: OBSTETRICS & GYNECOLOGY

## 2022-08-15 PROCEDURE — 3044F PR MOST RECENT HEMOGLOBIN A1C LEVEL <7.0%: ICD-10-PCS | Mod: CPTII,S$GLB,, | Performed by: OBSTETRICS & GYNECOLOGY

## 2022-08-15 PROCEDURE — 1159F MED LIST DOCD IN RCRD: CPT | Mod: CPTII,S$GLB,, | Performed by: OBSTETRICS & GYNECOLOGY

## 2022-08-15 PROCEDURE — 1159F PR MEDICATION LIST DOCUMENTED IN MEDICAL RECORD: ICD-10-PCS | Mod: CPTII,S$GLB,, | Performed by: OBSTETRICS & GYNECOLOGY

## 2022-08-15 PROCEDURE — 3074F PR MOST RECENT SYSTOLIC BLOOD PRESSURE < 130 MM HG: ICD-10-PCS | Mod: CPTII,S$GLB,, | Performed by: OBSTETRICS & GYNECOLOGY

## 2022-08-15 PROCEDURE — 3074F SYST BP LT 130 MM HG: CPT | Mod: CPTII,S$GLB,, | Performed by: OBSTETRICS & GYNECOLOGY

## 2022-08-15 PROCEDURE — 87624 HPV HI-RISK TYP POOLED RSLT: CPT | Performed by: OBSTETRICS & GYNECOLOGY

## 2022-08-15 PROCEDURE — 3078F DIAST BP <80 MM HG: CPT | Mod: CPTII,S$GLB,, | Performed by: OBSTETRICS & GYNECOLOGY

## 2022-08-15 PROCEDURE — 99396 PR PREVENTIVE VISIT,EST,40-64: ICD-10-PCS | Mod: S$GLB,,, | Performed by: OBSTETRICS & GYNECOLOGY

## 2022-08-15 RX ORDER — MELOXICAM 15 MG/1
15 TABLET ORAL DAILY PRN
COMMUNITY
Start: 2022-05-26 | End: 2023-08-31 | Stop reason: ALTCHOICE

## 2022-08-15 NOTE — PROGRESS NOTES
"  Subjective:       Patient ID: Marti Coley is a 66 y.o. female.    Chief Complaint:  Annual Exam and Gynecologic Exam      History of Present Illness  HPI    Marti Coley is a 66 y.o. female  here for her annual GYN exam. She remains with alopecia, but nothing new.  She continues to have dyspareunia and vaginal dryness, did not do well with the vaginal Intrarosa, found it too messy. Her vulvar irritation has improved since she has had less "moisture". Spouse has recently been diagnosed with Prostate cancer.   She is menopausal since age 52. (at the time of her diagnosis/txment of breast cancer).  denies break through bleeding.   denies vaginal itching or irritation.  Denies vaginal discharge.  She is not currently sexually active. She has had1 partner for 29 years .     History of abnormal pap: No  Last Pap: approximate date  and was normal  Last MMG: normal----routine follow-up in 12 months  Last Colonoscopy:  colonoscopy 2022 with abnormalities.      Past Medical History:   Diagnosis Date    Allergy     Breast cancer     left diagnosed in  - underwent chemotherapy and radiation after lumpectomy    Cancer 2008    left IDC, neoadjuvant chemo, 7mm residual IDC lumpectomy, negative Sn biopsy, adjuvant XRT and hormonal therapy with tamoxifen, ER/TX+, HER-2 negtive    Genetic testing 2008     negative MultiSite BRACAnalysis    HTN (hypertension)     Hyperlipidemia     Invasive lobular carcinoma of breast, stage 1 2012    Osteoarthritis      Past Surgical History:   Procedure Laterality Date    BREAST LUMPECTOMY Left     BREAST SURGERY  2008    left lumpectomy with negative SN biopsy    COLONOSCOPY N/A 2022    Procedure: COLONOSCOPY;  Surgeon: Arnaldo Gallagher MD;  Location: [address]);  Service: Endoscopy;  Laterality: N/A;  order -new order placed-original order placed by Dr. Lucero Mccrary  Clear liquids up to 2 hrs prior/ AM prep " "2am-3am - st   fully vaccinated; instructions to portal-st        ENDOMETRIAL BIOPSY  13     Social History     Socioeconomic History    Marital status:     Number of children: 0   Occupational History    Occupation:      Employer: [employer]   Tobacco Use    Smoking status: Never Smoker    Smokeless tobacco: Never Used   Substance and Sexual Activity    Alcohol use: No     Alcohol/week: 0.8 standard drinks     Types: 1 Standard drinks or equivalent per week     Comment: allergic    Drug use: No    Sexual activity: Yes     Partners: Male     Birth control/protection: Post-menopausal     Comment: teacher,   since , 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 Also walks dog.     Family History   Problem Relation Age of Onset    Breast cancer Mother 80    Hypertension Mother     Hyperlipidemia Mother     Breast cancer Sister 32        negative genetic testing 2017, multi gene panel     Cancer Sister         thyroid and uterine cancer, breast, follicular lymphoma     Endometrial cancer Sister     Uterine cancer Sister 55        endometrial cancer, diagnosed @ time of hysterectomy for fibroids    Breast cancer Paternal Grandmother 80    Heart disease Father     Cancer Father         bladder cancer     COPD Father     Aneurysm Father     Melanoma Brother     Heart disease Brother     Cancer Brother         melanoma     Ovarian cancer Neg Hx     Diabetes Neg Hx     Stroke Neg Hx      OB History        0    Para   0    Term   0       0    AB   0    Living   0       SAB   0    IAB   0    Ectopic   0    Multiple   0    Live Births   0                 /74   Ht 5' 3" (1.6 m)   LMP 2008 (Approximate) Comment: 3/2008  BMI 30.46 kg/m²         GYN & OB History  Patient's last menstrual period was 2008 (approximate).   Date of Last Pap: 8/15/2022    OB " History    Para Term  AB Living   0 0 0 0 0 0   SAB IAB Ectopic Multiple Live Births   0 0 0 0 0       Review of Systems  Review of Systems   Constitutional: Negative for activity change, appetite change, fatigue and unexpected weight change.   HENT: Negative.    Eyes: Negative for visual disturbance.   Respiratory: Negative for shortness of breath and wheezing.    Cardiovascular: Negative for chest pain, palpitations and leg swelling.   Gastrointestinal: Negative for abdominal pain, bloating and blood in stool.   Endocrine: Negative for diabetes, hair loss, hot flashes and hypothyroidism.   Genitourinary: Positive for dyspareunia and vaginal dryness. Negative for decreased libido and hot flashes.   Musculoskeletal: Negative for back pain and joint swelling.   Integumentary:  Negative for acne, hair changes and nipple discharge.   Neurological: Negative for headaches.   Hematological: Does not bruise/bleed easily.   Psychiatric/Behavioral: Negative for depression and sleep disturbance. The patient is not nervous/anxious.    Breast: Negative for mastodynia and nipple discharge          Objective:      Physical Exam:   Constitutional: She is oriented to person, place, and time. She appears well-developed and well-nourished.    HENT:   Head: Normocephalic and atraumatic.    Eyes: Pupils are equal, round, and reactive to light. EOM are normal.     Cardiovascular: Normal rate and regular rhythm.     Pulmonary/Chest: Effort normal and breath sounds normal.   BREASTS:   Right breast exhibits no inverted nipple, no mass, no nipple discharge, no skin change, no tenderness, no bleeding and no swelling. Left breast exhibits no inverted nipple, no mass, no nipple discharge, no skin change, no tenderness, no bleeding and no swelling.  Left breast slightly scarred.Breasts are symmetrical.              Abdominal: Soft. Bowel sounds are normal.     Genitourinary:    Pelvic exam was performed with patient supine.       Genitourinary Comments: PELVIC: Normal external genitalia without lesions.  Normal hair distribution.  Adequate perineal body, normal urethral meatus.  Vagina  Dry and poorly rugated, atrophic, without lesions or discharge.  Cervix pink, without lesions, discharge or tenderness.  No significant cystocele or rectocele.  Bimanual exam shows uterus to be normal size, regular, mobile and nontender.  Adnexa without masses or tenderness.    RECTAL:Deferred               Musculoskeletal: Normal range of motion and moves all extremeties.       Neurological: She is alert and oriented to person, place, and time.    Skin: Skin is warm and dry.    Psychiatric: She has a normal mood and affect.              Assessment:        1. Encounter for gynecological examination without abnormal finding    2. Pap smear for cervical cancer screening    3. History of breast cancer    4. Postmenopausal atrophic vaginitis               Plan:        1. Encounter for gynecological examination without abnormal finding  COUNSELING:  The patient was counseled today on regular weight bearing exercise. Patient was counseled today on the new ACS guidelines for cervical cytology screening as well as the current recommendations for breast cancer screening. Counseling session lasted approximately 10 minutes, and all her questions were answered. She was advised to see her primary care physician for all other health maintenance.   FOLLOW-UP with me for next routine visit.           2. Pap smear for cervical cancer screening    - HPV High Risk Genotypes, PCR  - Liquid-Based Pap Smear, Screening    3. History of breast cancer      4. Postmenopausal atrophic vaginitis     Discussed use of water based lubricants, DHEA, Hyaluronic acid. Will try Hyaluronic acid.     Follow up in about 1 year (around 8/15/2023).

## 2022-08-18 LAB
HPV HR 12 DNA SPEC QL NAA+PROBE: NEGATIVE
HPV16 AG SPEC QL: NEGATIVE
HPV18 DNA SPEC QL NAA+PROBE: NEGATIVE

## 2022-08-19 ENCOUNTER — HOSPITAL ENCOUNTER (OUTPATIENT)
Dept: RADIOLOGY | Facility: HOSPITAL | Age: 67
Discharge: HOME | End: 2022-08-19
Attending: NURSE PRACTITIONER
Payer: COMMERCIAL

## 2022-08-19 ENCOUNTER — OFFICE VISIT (OUTPATIENT)
Dept: ORTHOPEDICS | Facility: CLINIC | Age: 67
End: 2022-08-19
Payer: COMMERCIAL

## 2022-08-19 DIAGNOSIS — M79.672 LEFT FOOT PAIN: Primary | ICD-10-CM

## 2022-08-19 DIAGNOSIS — M79.672 LEFT FOOT PAIN: ICD-10-CM

## 2022-08-19 LAB
FINAL PATHOLOGIC DIAGNOSIS: NORMAL
Lab: NORMAL

## 2022-08-19 PROCEDURE — 99213 OFFICE O/P EST LOW 20 MIN: CPT | Mod: S$GLB,,, | Performed by: NURSE PRACTITIONER

## 2022-08-19 PROCEDURE — 73630 XR FOOT COMPLETE 3 VIEW LEFT: ICD-10-PCS | Mod: 26,LT,, | Performed by: RADIOLOGY

## 2022-08-19 PROCEDURE — 1159F MED LIST DOCD IN RCRD: CPT | Mod: CPTII,S$GLB,, | Performed by: NURSE PRACTITIONER

## 2022-08-19 PROCEDURE — 73630 X-RAY EXAM OF FOOT: CPT | Mod: TC,LT

## 2022-08-19 PROCEDURE — 73630 X-RAY EXAM OF FOOT: CPT | Mod: 26,LT,, | Performed by: RADIOLOGY

## 2022-08-19 PROCEDURE — 99999 PR PBB SHADOW E&M-EST. PATIENT-LVL III: ICD-10-PCS | Mod: PBBFAC,,, | Performed by: NURSE PRACTITIONER

## 2022-08-19 PROCEDURE — 1159F PR MEDICATION LIST DOCUMENTED IN MEDICAL RECORD: ICD-10-PCS | Mod: CPTII,S$GLB,, | Performed by: NURSE PRACTITIONER

## 2022-08-19 PROCEDURE — 3044F HG A1C LEVEL LT 7.0%: CPT | Mod: CPTII,S$GLB,, | Performed by: NURSE PRACTITIONER

## 2022-08-19 PROCEDURE — 1160F PR REVIEW ALL MEDS BY PRESCRIBER/CLIN PHARMACIST DOCUMENTED: ICD-10-PCS | Mod: CPTII,S$GLB,, | Performed by: NURSE PRACTITIONER

## 2022-08-19 PROCEDURE — 1160F RVW MEDS BY RX/DR IN RCRD: CPT | Mod: CPTII,S$GLB,, | Performed by: NURSE PRACTITIONER

## 2022-08-19 PROCEDURE — 3044F PR MOST RECENT HEMOGLOBIN A1C LEVEL <7.0%: ICD-10-PCS | Mod: CPTII,S$GLB,, | Performed by: NURSE PRACTITIONER

## 2022-08-19 PROCEDURE — 99213 PR OFFICE/OUTPT VISIT, EST, LEVL III, 20-29 MIN: ICD-10-PCS | Mod: S$GLB,,, | Performed by: NURSE PRACTITIONER

## 2022-08-19 PROCEDURE — 99999 PR PBB SHADOW E&M-EST. PATIENT-LVL III: CPT | Mod: PBBFAC,,, | Performed by: NURSE PRACTITIONER

## 2022-08-19 NOTE — PROGRESS NOTES
CC: left foot fracture    HPI: Pt returns to clinic for a f/u from a 5th metatarsal fracture of the left foot 2 weeks ago. She was seen at urgent care and placed in a walking boot. The pain and swelling have improved. She has even been able to walk her dog sometimes. She is ambulating without difficulty.    ROS  General: denies fever and chills  Resp: no c/o sob  CVS: no c/o cp  MSK: c/o left foot fracture    PE  General: AAOx3, pleasant and cooperative  Resp: respirations even and unlabored  MSK: left foot exam  Full range of motion  No erythema or warmth    Xray:  Reviewed by me with the patient: Minimally displaced acute, oblique fracture of the mid 5th metatarsal.  The distal fragment is position slightly medially with respect to the proximal shaft.  No significant angulation    Assessment:  5th metatarsal fracture left foot    Plan:  Continue to wear the boot for 6-8 weeks from the date of injury then ok to transition to hard soled shoe and eventually graduate to regular shoes as tolerated.

## 2022-08-30 ENCOUNTER — HOSPITAL ENCOUNTER (OUTPATIENT)
Dept: RADIOLOGY | Facility: OTHER | Age: 67
Discharge: HOME | End: 2022-08-30
Attending: OBSTETRICS & GYNECOLOGY
Payer: COMMERCIAL

## 2022-08-30 DIAGNOSIS — Z12.31 SCREENING MAMMOGRAM, ENCOUNTER FOR: ICD-10-CM

## 2022-08-30 PROCEDURE — 77067 MAMMO DIGITAL SCREENING BILAT WITH TOMO: ICD-10-PCS | Mod: 26,,, | Performed by: RADIOLOGY

## 2022-08-30 PROCEDURE — 77063 BREAST TOMOSYNTHESIS BI: CPT | Mod: 26,,, | Performed by: RADIOLOGY

## 2022-08-30 PROCEDURE — 77067 SCR MAMMO BI INCL CAD: CPT | Mod: 26,,, | Performed by: RADIOLOGY

## 2022-08-30 PROCEDURE — 77063 BREAST TOMOSYNTHESIS BI: CPT | Mod: TC

## 2022-08-30 PROCEDURE — 77063 MAMMO DIGITAL SCREENING BILAT WITH TOMO: ICD-10-PCS | Mod: 26,,, | Performed by: RADIOLOGY

## 2022-08-30 PROCEDURE — 77067 SCR MAMMO BI INCL CAD: CPT | Mod: TC

## 2022-10-24 ENCOUNTER — PATIENT MESSAGE (OUTPATIENT)
Dept: ORTHOPEDICS | Facility: CLINIC | Age: 67
End: 2022-10-24
Payer: COMMERCIAL

## 2022-10-24 ENCOUNTER — TELEPHONE (OUTPATIENT)
Dept: ORTHOPEDICS | Facility: CLINIC | Age: 67
End: 2022-10-24
Payer: COMMERCIAL

## 2022-10-24 DIAGNOSIS — R52 PAIN: Primary | ICD-10-CM

## 2022-10-26 ENCOUNTER — PATIENT MESSAGE (OUTPATIENT)
Dept: ORTHOPEDICS | Facility: CLINIC | Age: 67
End: 2022-10-26

## 2022-10-26 ENCOUNTER — HOSPITAL ENCOUNTER (OUTPATIENT)
Dept: RADIOLOGY | Facility: OTHER | Age: 67
Discharge: HOME | End: 2022-10-26
Attending: PHYSICIAN ASSISTANT
Payer: COMMERCIAL

## 2022-10-26 ENCOUNTER — OFFICE VISIT (OUTPATIENT)
Dept: ORTHOPEDICS | Facility: CLINIC | Age: 67
End: 2022-10-26
Payer: COMMERCIAL

## 2022-10-26 VITALS
DIASTOLIC BLOOD PRESSURE: 76 MMHG | BODY MASS INDEX: 30.3 KG/M2 | SYSTOLIC BLOOD PRESSURE: 122 MMHG | HEART RATE: 73 BPM | WEIGHT: 171 LBS | HEIGHT: 63 IN

## 2022-10-26 DIAGNOSIS — R52 PAIN: ICD-10-CM

## 2022-10-26 DIAGNOSIS — M70.22 OLECRANON BURSITIS OF LEFT ELBOW: Primary | ICD-10-CM

## 2022-10-26 PROCEDURE — 73080 XR ELBOW COMPLETE 3 VIEW LEFT: ICD-10-PCS | Mod: 26,LT,, | Performed by: RADIOLOGY

## 2022-10-26 PROCEDURE — 1159F PR MEDICATION LIST DOCUMENTED IN MEDICAL RECORD: ICD-10-PCS | Mod: CPTII,S$GLB,, | Performed by: PHYSICIAN ASSISTANT

## 2022-10-26 PROCEDURE — 3074F SYST BP LT 130 MM HG: CPT | Mod: CPTII,S$GLB,, | Performed by: PHYSICIAN ASSISTANT

## 2022-10-26 PROCEDURE — 1160F PR REVIEW ALL MEDS BY PRESCRIBER/CLIN PHARMACIST DOCUMENTED: ICD-10-PCS | Mod: CPTII,S$GLB,, | Performed by: PHYSICIAN ASSISTANT

## 2022-10-26 PROCEDURE — 3078F PR MOST RECENT DIASTOLIC BLOOD PRESSURE < 80 MM HG: ICD-10-PCS | Mod: CPTII,S$GLB,, | Performed by: PHYSICIAN ASSISTANT

## 2022-10-26 PROCEDURE — 1159F MED LIST DOCD IN RCRD: CPT | Mod: CPTII,S$GLB,, | Performed by: PHYSICIAN ASSISTANT

## 2022-10-26 PROCEDURE — 99999 PR PBB SHADOW E&M-EST. PATIENT-LVL III: ICD-10-PCS | Mod: PBBFAC,,, | Performed by: PHYSICIAN ASSISTANT

## 2022-10-26 PROCEDURE — 99204 OFFICE O/P NEW MOD 45 MIN: CPT | Mod: S$GLB,,, | Performed by: PHYSICIAN ASSISTANT

## 2022-10-26 PROCEDURE — 3078F DIAST BP <80 MM HG: CPT | Mod: CPTII,S$GLB,, | Performed by: PHYSICIAN ASSISTANT

## 2022-10-26 PROCEDURE — 3044F HG A1C LEVEL LT 7.0%: CPT | Mod: CPTII,S$GLB,, | Performed by: PHYSICIAN ASSISTANT

## 2022-10-26 PROCEDURE — 1160F RVW MEDS BY RX/DR IN RCRD: CPT | Mod: CPTII,S$GLB,, | Performed by: PHYSICIAN ASSISTANT

## 2022-10-26 PROCEDURE — 3044F PR MOST RECENT HEMOGLOBIN A1C LEVEL <7.0%: ICD-10-PCS | Mod: CPTII,S$GLB,, | Performed by: PHYSICIAN ASSISTANT

## 2022-10-26 PROCEDURE — 99204 PR OFFICE/OUTPT VISIT, NEW, LEVL IV, 45-59 MIN: ICD-10-PCS | Mod: S$GLB,,, | Performed by: PHYSICIAN ASSISTANT

## 2022-10-26 PROCEDURE — 99999 PR PBB SHADOW E&M-EST. PATIENT-LVL III: CPT | Mod: PBBFAC,,, | Performed by: PHYSICIAN ASSISTANT

## 2022-10-26 PROCEDURE — 73080 X-RAY EXAM OF ELBOW: CPT | Mod: 26,LT,, | Performed by: RADIOLOGY

## 2022-10-26 PROCEDURE — 73080 X-RAY EXAM OF ELBOW: CPT | Mod: TC,FY,LT

## 2022-10-26 PROCEDURE — 3074F PR MOST RECENT SYSTOLIC BLOOD PRESSURE < 130 MM HG: ICD-10-PCS | Mod: CPTII,S$GLB,, | Performed by: PHYSICIAN ASSISTANT

## 2022-10-26 NOTE — PROGRESS NOTES
Subjective:      Patient ID: Marti Coley is a 66 y.o. female.    Chief Complaint: Pain and Swelling of the Left Elbow      HPI  Marti Coley is a right hand dominant 66 y.o. female presenting today for evaluation of left elbow swelling.  There was a history of trauma - she fell in 5/2022 and noticed swelling. She was seen in urgent care after the fall, underwent elbow aspiration.  She was seen in the Orthopedic Clinic at [facility] in 6/22, underwent aspiration and steroid injection at that time.  She reports a repeat fall in 8/2022, she noticed a lump over the posterior left forearm that was getting smaller.  Over the past week she has noticed a return of left elbow swelling. Denies any other elbow trauma.  She has been using an elbow sleeve intermittently over the past few days.      Review of patient's allergies indicates:   Allergen Reactions    Ciprofloxacin Rash    Penicillins Rash         Current Outpatient Medications   Medication Sig Dispense Refill    amLODIPine (NORVASC) 5 MG tablet Take 1 tablet (5 mg total) by mouth once daily. 90 tablet 3    clobetasol 0.05% (TEMOVATE) 0.05 % Oint Apply topically 2 (two) times daily. 30 g 6    clotrimazole-betamethasone 1-0.05% (LOTRISONE) cream Apply topically 2 (two) times daily. Apply to affected area 15 g 6    ergocalciferol, vitamin D2, (VITAMIN D ORAL)       omega-3 fatty acids/fish oil (FISH OIL-OMEGA-3 FATTY ACIDS) 300-1,000 mg capsule Take 1 capsule by mouth once daily.      celecoxib (CELEBREX) 200 MG capsule TAKE 1 CAPSULE BY MOUTH TWICE A DAY (Patient not taking: No sig reported) 90 capsule 1    meloxicam (MOBIC) 15 MG tablet Take 15 mg by mouth daily as needed.       No current facility-administered medications for this visit.       Past Medical History:   Diagnosis Date    Allergy     Breast cancer 2008    left diagnosed in 2008 - underwent chemotherapy and radiation after lumpectomy    Cancer 4/2008    left IDC, neoadjuvant chemo, 7mm residual  "IDC lumpectomy, negative Sn biopsy, adjuvant XRT and hormonal therapy with tamoxifen, ER/[place]+, HER-2 negtive    Genetic testing 2008     negative MultiSite BRACAnalysis    HTN (hypertension)     Hyperlipidemia     Invasive lobular carcinoma of breast, stage 1 2012    Osteoarthritis        Past Surgical History:   Procedure Laterality Date    BREAST LUMPECTOMY Left     BREAST SURGERY  2008    left lumpectomy with negative SN biopsy    COLONOSCOPY N/A 2022    Procedure: COLONOSCOPY;  Surgeon: Arnaldo Gallagher MD;  Location: [facility] ([address] [place] [address]);  Service: Endoscopy;  Laterality: N/A;  order -new order placed-original order placed by Dr. Lucero Mccrary  Clear liquids up to 2 hrs prior/ AM prep 2am-3am - st   fully vaccinated; instructions to portal-st        ENDOMETRIAL BIOPSY  11         Review of Systems:  ROS:  Constitutional: no fever or chills  Skin: no rash or suspicious lesions  Musculoskeletal: See HPI.   Neurological: no headaches, lightheadedness, or dizziness. No numbness or tingling  Psychological/behavioral: no anxiety or depression      OBJECTIVE:     PHYSICAL EXAM:  Height: 5' 3" (160 cm) Weight: 77.6 kg (171 lb)  Vitals:    10/26/22 1121   BP: 122/76   Pulse: 73   Weight: 77.6 kg (171 lb)   Height: 5' 3" (1.6 m)   PainSc:   4     Vitals reviewed.  Constitutional: NAD. Patient appears well-developed and well-nourished.   HENT:   Head: Normocephalic and atraumatic.   Neck: Normal range of motion.   Cardiovascular: Normal rate.    Pulmonary/Chest: Effort normal. No respiratory distress.   Neurological: Patient is awake, alert, oriented.   Psychiatric: Patient has a normal mood and affect. Behavior is normal. Judgment and thought content normal.  Musculoskeletal:  No scars noted.  No lacerations or abrasions.  No ecchymosis, no erythema, no increased warmth of the skin.  There is a 4 x 7 cm area of edema over the left posterior elbow extending in to the proximal forearm, " consistent with olecranon bursitis.  Soft, mildly tender to palpation.  Good finger, wrist, and elbow range of motion.  Neurovascularly intact-good sensation and motor function, good capillary refill, 2+ radial pulses.    RADIOGRAPHS:  Left elbow x-ray, 10/26/2022  FINDINGS:  No acute fracture or dislocation identified.  No elbow joint effusion.     Progressive soft tissue thickening and edema along the posterior aspect of the elbow.     Impression:     Please see above.    Comments: I have personally reviewed the imaging and I agree with the above radiologist's report.    ASSESSMENT/PLAN:   Marti was seen today for pain and swelling.    Diagnoses and all orders for this visit:    Olecranon bursitis of left elbow         - We talked at length about the anatomy and pathophysiology of   Encounter Diagnosis   Name Primary?    Olecranon bursitis of left elbow Yes       - discussed patient's symptoms and physical exam findings, discussed her to prior aspirations for olecranon bursitis.  We discussed her recurrence over the past week.  We discussed conservative and surgical treatment options.  She will continue to use a compression sleeve at this time.  She is interested in surgical excision for the olecranon bursitis, however needs to wait 3-4 weeks as her  is currently undergoing treatments that require her help as caregiver  - indications and risks of the procedure were discussed in detail, postoperative splinting and compression were discussed, postoperative therapy was discussed.  Her questions were answered, consent form signed today in clinic.  - call with any questions or concerns    Disclaimer: This note has been generated using voice-recognition software. There may be typographical errors that have been missed during proof-reading.

## 2022-10-27 ENCOUNTER — IMMUNIZATION (OUTPATIENT)
Dept: INTERNAL MEDICINE | Facility: CLINIC | Age: 67
End: 2022-10-27
Payer: COMMERCIAL

## 2022-10-27 ENCOUNTER — PATIENT MESSAGE (OUTPATIENT)
Dept: ORTHOPEDICS | Facility: CLINIC | Age: 67
End: 2022-10-27
Payer: COMMERCIAL

## 2022-10-27 PROCEDURE — 90686 IIV4 VACC NO PRSV 0.5 ML IM: CPT | Mod: S$GLB,,, | Performed by: INTERNAL MEDICINE

## 2022-10-27 PROCEDURE — 90471 FLU VACCINE (QUAD) GREATER THAN OR EQUAL TO 3YO PRESERVATIVE FREE IM: ICD-10-PCS | Mod: S$GLB,,, | Performed by: INTERNAL MEDICINE

## 2022-10-27 PROCEDURE — 90471 IMMUNIZATION ADMIN: CPT | Mod: S$GLB,,, | Performed by: INTERNAL MEDICINE

## 2022-10-27 PROCEDURE — 90686 FLU VACCINE (QUAD) GREATER THAN OR EQUAL TO 3YO PRESERVATIVE FREE IM: ICD-10-PCS | Mod: S$GLB,,, | Performed by: INTERNAL MEDICINE

## 2022-10-31 DIAGNOSIS — M70.22 OLECRANON BURSITIS OF LEFT ELBOW: Primary | ICD-10-CM

## 2022-10-31 DIAGNOSIS — M25.522 LEFT ELBOW PAIN: ICD-10-CM

## 2022-10-31 DIAGNOSIS — R52 PAIN: ICD-10-CM

## 2022-11-09 ENCOUNTER — PATIENT MESSAGE (OUTPATIENT)
Dept: ADMINISTRATIVE | Facility: OTHER | Age: 67
End: 2022-11-09
Payer: COMMERCIAL

## 2022-11-09 ENCOUNTER — PATIENT MESSAGE (OUTPATIENT)
Dept: ORTHOPEDICS | Facility: CLINIC | Age: 67
End: 2022-11-09
Payer: COMMERCIAL

## 2022-11-10 ENCOUNTER — ANESTHESIA EVENT (OUTPATIENT)
Dept: SURGERY | Facility: OTHER | Age: 67
End: 2022-11-10
Payer: COMMERCIAL

## 2022-11-10 ENCOUNTER — HOSPITAL ENCOUNTER (OUTPATIENT)
Dept: PREADMISSION TESTING | Facility: OTHER | Age: 67
Discharge: HOME | End: 2022-11-10
Attending: ORTHOPAEDIC SURGERY
Payer: COMMERCIAL

## 2022-11-10 VITALS
WEIGHT: 171 LBS | OXYGEN SATURATION: 97 % | DIASTOLIC BLOOD PRESSURE: 74 MMHG | HEIGHT: 63 IN | BODY MASS INDEX: 30.3 KG/M2 | SYSTOLIC BLOOD PRESSURE: 130 MMHG | TEMPERATURE: 98 F | RESPIRATION RATE: 16 BRPM | HEART RATE: 78 BPM

## 2022-11-10 RX ORDER — LIDOCAINE HYDROCHLORIDE 10 MG/ML
1 INJECTION, SOLUTION EPIDURAL; INFILTRATION; INTRACAUDAL; PERINEURAL ONCE
Status: CANCELLED | OUTPATIENT
Start: 2022-11-10 | End: 2022-11-10

## 2022-11-10 RX ORDER — ACETAMINOPHEN 500 MG
1000 TABLET ORAL
Status: CANCELLED | OUTPATIENT
Start: 2022-11-10 | End: 2022-11-10

## 2022-11-10 NOTE — DISCHARGE INSTRUCTIONS
Information to Prepare you for your Surgery    PRE-ADMIT TESTING -  [phone]    [address] [facility]          Your surgery has been scheduled at [facility]. We are pleased to have the opportunity to serve you. For Further Information please call [phone].    On the day of surgery please report to the Information Desk on the 1st floor.    CONTACT YOUR PHYSICIAN'S OFFICE THE DAY PRIOR TO YOUR SURGERY TO OBTAIN YOUR ARRIVAL TIME.     The evening before surgery do not eat anything after 9 p.m. ( this includes hard candy, chewing gum and mints).  You may only have GATORADE, POWERADE AND WATER  from 9 p.m. until you leave your home.   DO NOT DRINK ANY LIQUIDS ON THE WAY TO THE HOSPITAL.      Why does your anesthesiologist allow you to drink Gatorade/Powerade before surgery?  Gatorade/Powerade helps to increase your comfort before surgery and to decrease your nausea after surgery. The carbohydrates in Gatorade/Powerade help reduce your body's stress response to surgery.  If you are a diabetic-drink only water prior to surgery.      Current Visitor policy(12/27/2021) - Patients may have 2 visitors pre and post procedure. Only 2 visitors will be allowed in the Surgical building with the patient.     SPECIAL MEDICATION INSTRUCTIONS: TAKE medications checked off by the Anesthesiologist on your Medication List.    Angiogram Patients: Take medications as instructed by your physician, including aspirin.     Surgery Patients:    If you take ASPIRIN - Your PHYSICIAN/SURGEON will need to inform you IF/OR when you need to stop taking aspirin prior to your surgery.     The week prior to surgery do not ot take any medications containing IBUPROFEN or NSAIDS ( Advil, Motrin, Goodys, BC, Aleve, Naproxen etc) If you are not sure if you should take a medicine please call your surgeon's office.  Ok to take Tylenol    Do Not Wear any make-up (especially eye make-up) to  surgery. Please remove any false eyelashes or eyelash extensions. If you arrive the day of surgery with makeup/eyelashes on you will be required to remove prior to surgery. (There is a risk of corneal abrasions if eye makeup/eyelash extensions are not removed)      Leave all valuables at home.   Do Not wear any jewelry or watches, including any metal in body piercings. Jewelry must be removed prior to coming to the hospital.  There is a possibility that rings that are unable to be removed may be cut off if they are on the surgical extremity.    Please remove all hair extensions, wigs, clips and any other metal accessories/ ornaments from your hair.  These items may pose a flammable/fire risk in Surgery and must be removed.    Do not shave your surgical area at least 5 days prior to your surgery. The surgical prep will be performed at the hospital according to Infection Control regulations.    Contact Lens must be removed before surgery. Either do not wear the contact lens or bring a case and solution for storage.  Please bring a container for eyeglasses or dentures as required.  Bring any paperwork your physician has provided, such as consent forms,  history and physicals, doctor's orders, etc.   Bring comfortable clothes that are loose fitting to wear upon discharge. Take into consideration the type of surgery being performed.  Maintain your diet as advised per your physician the day prior to surgery.      Adequate rest the night before surgery is advised.   Park in the Parking lot behind the hospital or in the [facility] across the street from the parking lot. Parking is complimentary.  If you will be discharged the same day as your procedure, please arrange for a responsible adult to drive you home or to accompany you if traveling by taxi.   YOU WILL NOT BE PERMITTED TO DRIVE OR TO LEAVE THE HOSPITAL ALONE AFTER SURGERY.   If you are being discharged the same day, it is strongly recommended that you  arrange for someone to remain with you for the first 24 hrs following your surgery.    The Surgeon will speak to your family/visitor after your surgery regarding the outcome of your surgery and post op care.  The Surgeon may speak to you after your surgery, but there is a possibility you may not remember the details.  Please check with your family members regarding the conversation with the Surgeon.    We strongly recommend whoever is bringing you home be present for discharge instructions.  This will ensure a thorough understanding for your post op home care.    ALL CHILDREN MUST ALWAYS BE ACCOMPANIED BY AN ADULT.    Visitors-Refer to current Visitor policy handouts.    Thank you for your cooperation.  The Staff of [facility].            Bathing Instructions with Hibiclens    Shower the evening before and morning of your procedure with Chlorhexidine (Hibiclens)  do not use Chlorhexidine on your face or genitals. Do not get in your eyes.  Wash your face with water and your regular face wash/soap  Use your regular shampoo  Apply Chlorhexidine (Hibiclens) directly on your skin or on a wet washcloth and wash gently. When showering: Move away from the shower stream when applying Chlorhexidine (Hibiclens) to avoid rinsing off too soon.  Rinse thoroughly with warm water  Do not dilute Chlorhexidine (Hibiclens)   Dry off as usual, do not use any deodorant, powder, body lotions, perfume, after shave or cologne.

## 2022-11-10 NOTE — ANESTHESIA PREPROCEDURE EVALUATION
11/10/2022  Marti Coley is a 66 y.o., female.      Pre-op Assessment    I have reviewed the Patient Summary Reports.     I have reviewed the Nursing Notes. I have reviewed the NPO Status.   I have reviewed the Medications.     Review of Systems  Anesthesia Hx:  No previous Anesthesia  History of prior surgery of interest to airway management or planning: Denies Family Hx of Anesthesia complications.   Denies Personal Hx of Anesthesia complications.   Social:  Non-Smoker    Hematology/Oncology:  Hematology Normal       -- Cancer in past history:  Breast left chemotherapy, radiation and surgery  Oncology Comments: Lumpectomy left breast     EENT/Dental:EENT/Dental Normal   Cardiovascular:   Exercise tolerance: good Hypertension, well controlled hyperlipidemia    Pulmonary:  Pulmonary Normal    Renal/:  Renal/ Normal     Hepatic/GI:  Hepatic/GI Normal    Musculoskeletal:   Arthritis     Neurological:  Neurology Normal    Endocrine:  Endocrine Normal    Dermatological:  Skin Normal    Psych:  Psychiatric Normal           Physical Exam  General: Well nourished, Cooperative, Alert and Oriented    Airway:  Mallampati: I   Mouth Opening: Normal  Neck ROM: Normal ROM    Dental:  Intact, Caps / Implants        Anesthesia Plan  Type of Anesthesia, risks & benefits discussed:    Anesthesia Type: Regional  Intra-op Monitoring Plan: Standard ASA Monitors  Post Op Pain Control Plan: multimodal analgesia and peripheral nerve block  Informed Consent: Informed consent signed with the Patient and all parties understand the risks and agree with anesthesia plan.  All questions answered.   ASA Score: 2  Anesthesia Plan Notes: Labs ok in epic,Regional discussed    Ready For Surgery From Anesthesia Perspective.     .

## 2022-11-11 ENCOUNTER — HOSPITAL ENCOUNTER (OUTPATIENT)
Dept: RADIOLOGY | Facility: OTHER | Age: 67
Discharge: HOME | End: 2022-11-11
Attending: INTERNAL MEDICINE
Payer: COMMERCIAL

## 2022-11-11 DIAGNOSIS — Z78.0 POSTMENOPAUSAL: ICD-10-CM

## 2022-11-11 PROCEDURE — 77080 DEXA BONE DENSITY SPINE HIP: ICD-10-PCS | Mod: 26,,, | Performed by: RADIOLOGY

## 2022-11-11 PROCEDURE — 77080 DXA BONE DENSITY AXIAL: CPT | Mod: 26,,, | Performed by: RADIOLOGY

## 2022-11-11 PROCEDURE — 77080 DXA BONE DENSITY AXIAL: CPT | Mod: TC

## 2022-11-14 RX ORDER — TRAMADOL HYDROCHLORIDE 50 MG/1
50 TABLET ORAL EVERY 6 HOURS PRN
Qty: 15 TABLET | Refills: 0 | Status: SHIPPED | OUTPATIENT
Start: 2022-11-14 | End: 2023-02-07

## 2022-11-15 ENCOUNTER — TELEPHONE (OUTPATIENT)
Dept: ORTHOPEDICS | Facility: CLINIC | Age: 67
End: 2022-11-15
Payer: COMMERCIAL

## 2022-11-15 RX ORDER — MUPIROCIN 20 MG/G
OINTMENT TOPICAL
Status: CANCELLED | OUTPATIENT
Start: 2022-11-15

## 2022-11-15 NOTE — H&P
Patient ID: Marti Coley is a 66 y.o. female.     Chief Complaint: Pain and Swelling of the Left Elbow        HPI  Marti Coley is a right hand dominant 66 y.o. female presenting today for evaluation of left elbow swelling.  There was a history of trauma - she fell in 5/2022 and noticed swelling. She was seen in urgent care after the fall, underwent elbow aspiration.  She was seen in the Orthopedic Clinic at [facility] in 6/22, underwent aspiration and steroid injection at that time.  She reports a repeat fall in 8/2022, she noticed a lump over the posterior left forearm that was getting smaller.  Over the past week she has noticed a return of left elbow swelling. Denies any other elbow trauma.  She has been using an elbow sleeve intermittently over the past few days.             Review of patient's allergies indicates:   Allergen Reactions    Ciprofloxacin Rash    Penicillins Rash          Current Medications          Current Outpatient Medications   Medication Sig Dispense Refill    amLODIPine (NORVASC) 5 MG tablet Take 1 tablet (5 mg total) by mouth once daily. 90 tablet 3    clobetasol 0.05% (TEMOVATE) 0.05 % Oint Apply topically 2 (two) times daily. 30 g 6    clotrimazole-betamethasone 1-0.05% (LOTRISONE) cream Apply topically 2 (two) times daily. Apply to affected area 15 g 6    ergocalciferol, vitamin D2, (VITAMIN D ORAL)          omega-3 fatty acids/fish oil (FISH OIL-OMEGA-3 FATTY ACIDS) 300-1,000 mg capsule Take 1 capsule by mouth once daily.        celecoxib (CELEBREX) 200 MG capsule TAKE 1 CAPSULE BY MOUTH TWICE A DAY (Patient not taking: No sig reported) 90 capsule 1    meloxicam (MOBIC) 15 MG tablet Take 15 mg by mouth daily as needed.          No current facility-administered medications for this visit.                 Past Medical History:   Diagnosis Date    Allergy      Breast cancer 2008     left diagnosed in 2008 - underwent chemotherapy and radiation after lumpectomy    Cancer 4/2008     " left IDC, neoadjuvant chemo, 7mm residual IDC lumpectomy, negative Sn biopsy, adjuvant XRT and hormonal therapy with tamoxifen, ER/[place]+, HER-2 negtive    Genetic testing 2008      negative MultiSite BRACAnalysis    HTN (hypertension)      Hyperlipidemia      Invasive lobular carcinoma of breast, stage 1 2012    Osteoarthritis                 Past Surgical History:   Procedure Laterality Date    BREAST LUMPECTOMY Left     BREAST SURGERY   2008     left lumpectomy with negative SN biopsy    COLONOSCOPY N/A 2022     Procedure: COLONOSCOPY;  Surgeon: Arnaldo Gallagher MD;  Location: [facility] ([address] [place] [address]);  Service: Endoscopy;  Laterality: N/A;  order -new order placed-original order placed by Dr. Lucero Mccrary  Clear liquids up to 2 hrs prior/ AM prep 2am-3am - st   fully vaccinated; instructions to portal-st          ENDOMETRIAL BIOPSY   11            Review of Systems:  ROS:  Constitutional: no fever or chills  Skin: no rash or suspicious lesions  Musculoskeletal: See HPI.   Neurological: no headaches, lightheadedness, or dizziness. No numbness or tingling  Psychological/behavioral: no anxiety or depression        OBJECTIVE:      PHYSICAL EXAM:  Height: 5' 3" (160 cm) Weight: 77.6 kg (171 lb)      Vitals:     10/26/22 1121   BP: 122/76   Pulse: 73   Weight: 77.6 kg (171 lb)   Height: 5' 3" (1.6 m)   PainSc:   4      Vitals reviewed.  Constitutional: NAD. Patient appears well-developed and well-nourished.   HENT:   Head: Normocephalic and atraumatic.   Neck: Normal range of motion.   Cardiovascular: Normal rate.    Pulmonary/Chest: Effort normal. No respiratory distress.   Neurological: Patient is awake, alert, oriented.   Psychiatric: Patient has a normal mood and affect. Behavior is normal. Judgment and thought content normal.  Musculoskeletal:  No scars noted.  No lacerations or abrasions.  No ecchymosis, no erythema, no increased warmth of the skin.  There is a 4 x 7 cm area of " edema over the left posterior elbow extending in to the proximal forearm, consistent with olecranon bursitis.  Soft, mildly tender to palpation.  Good finger, wrist, and elbow range of motion.  Neurovascularly intact-good sensation and motor function, good capillary refill, 2+ radial pulses.     RADIOGRAPHS:  Left elbow x-ray, 10/26/2022  FINDINGS:  No acute fracture or dislocation identified.  No elbow joint effusion.     Progressive soft tissue thickening and edema along the posterior aspect of the elbow.     Impression:     Please see above.     Comments: I have personally reviewed the imaging and I agree with the above radiologist's report.     ASSESSMENT/PLAN:   Marti was seen today for pain and swelling.     Diagnoses and all orders for this visit:     Olecranon bursitis of left elbow           - We talked at length about the anatomy and pathophysiology of        Encounter Diagnosis   Name Primary?    Olecranon bursitis of left elbow Yes         - discussed patient's symptoms and physical exam findings, discussed her to prior aspirations for olecranon bursitis.  We discussed her recurrence over the past week.  We discussed conservative and surgical treatment options.  She will continue to use a compression sleeve at this time.  She is interested in surgical excision for the olecranon bursitis, however needs to wait 3-4 weeks as her  is currently undergoing treatments that require her help as caregiver  - indications and risks of the procedure were discussed in detail, postoperative splinting and compression were discussed, postoperative therapy was discussed.  Her questions were answered, consent form signed today in clinic.  - call with any questions or concerns

## 2022-11-15 NOTE — TELEPHONE ENCOUNTER
Spoke c pt. Informed pt of 8:00 arrival time for 05/18/22 surgery at the [facility]. Reminded pt of NPO status. Pt expressed understanding & was thankful.

## 2022-11-16 ENCOUNTER — ANESTHESIA (OUTPATIENT)
Dept: SURGERY | Facility: OTHER | Age: 67
End: 2022-11-16
Payer: COMMERCIAL

## 2022-11-16 ENCOUNTER — HOSPITAL ENCOUNTER (OUTPATIENT)
Facility: OTHER | Age: 67
Discharge: HOME | End: 2022-11-16
Attending: ORTHOPAEDIC SURGERY | Admitting: ORTHOPAEDIC SURGERY
Payer: COMMERCIAL

## 2022-11-16 DIAGNOSIS — M70.32 BURSITIS OF LEFT ELBOW, UNSPECIFIED BURSA: Primary | ICD-10-CM

## 2022-11-16 PROBLEM — M70.22 OLECRANON BURSITIS OF LEFT ELBOW: Status: ACTIVE | Noted: 2022-11-16

## 2022-11-16 LAB
GRAM STN SPEC: NORMAL
GRAM STN SPEC: NORMAL

## 2022-11-16 PROCEDURE — 36000708 HC OR TIME LEV III 1ST 15 MIN: Performed by: ORTHOPAEDIC SURGERY

## 2022-11-16 PROCEDURE — 25000003 PHARM REV CODE 250: Performed by: ANESTHESIOLOGY

## 2022-11-16 PROCEDURE — 25000003 PHARM REV CODE 250: Performed by: STUDENT IN AN ORGANIZED HEALTH CARE EDUCATION/TRAINING PROGRAM

## 2022-11-16 PROCEDURE — 87176 TISSUE HOMOGENIZATION CULTR: CPT | Performed by: ORTHOPAEDIC SURGERY

## 2022-11-16 PROCEDURE — 63600175 PHARM REV CODE 636 W HCPCS: Performed by: ANESTHESIOLOGY

## 2022-11-16 PROCEDURE — 24105 EXCISION OLECRANON BURSA: CPT | Mod: LT,,, | Performed by: ORTHOPAEDIC SURGERY

## 2022-11-16 PROCEDURE — 87070 CULTURE OTHR SPECIMN AEROBIC: CPT | Performed by: ORTHOPAEDIC SURGERY

## 2022-11-16 PROCEDURE — 71000016 HC POSTOP RECOV ADDL HR: Performed by: ORTHOPAEDIC SURGERY

## 2022-11-16 PROCEDURE — 76942 ECHO GUIDE FOR BIOPSY: CPT | Performed by: ANESTHESIOLOGY

## 2022-11-16 PROCEDURE — 87102 FUNGUS ISOLATION CULTURE: CPT | Performed by: ORTHOPAEDIC SURGERY

## 2022-11-16 PROCEDURE — 36000709 HC OR TIME LEV III EA ADD 15 MIN: Performed by: ORTHOPAEDIC SURGERY

## 2022-11-16 PROCEDURE — 88304 TISSUE EXAM BY PATHOLOGIST: CPT | Performed by: STUDENT IN AN ORGANIZED HEALTH CARE EDUCATION/TRAINING PROGRAM

## 2022-11-16 PROCEDURE — 37000009 HC ANESTHESIA EA ADD 15 MINS: Performed by: ORTHOPAEDIC SURGERY

## 2022-11-16 PROCEDURE — 25000003 PHARM REV CODE 250: Performed by: ORTHOPAEDIC SURGERY

## 2022-11-16 PROCEDURE — 24105 PR REMOVAL OF ELBOW BURSA: ICD-10-PCS | Mod: LT,,, | Performed by: ORTHOPAEDIC SURGERY

## 2022-11-16 PROCEDURE — 87205 SMEAR GRAM STAIN: CPT | Performed by: ORTHOPAEDIC SURGERY

## 2022-11-16 PROCEDURE — 88304 TISSUE EXAM BY PATHOLOGIST: CPT | Mod: 26,,, | Performed by: STUDENT IN AN ORGANIZED HEALTH CARE EDUCATION/TRAINING PROGRAM

## 2022-11-16 PROCEDURE — 71000015 HC POSTOP RECOV 1ST HR: Performed by: ORTHOPAEDIC SURGERY

## 2022-11-16 PROCEDURE — 63600175 PHARM REV CODE 636 W HCPCS

## 2022-11-16 PROCEDURE — 88304 PR  SURG PATH,LEVEL III: ICD-10-PCS | Mod: 26,,, | Performed by: STUDENT IN AN ORGANIZED HEALTH CARE EDUCATION/TRAINING PROGRAM

## 2022-11-16 PROCEDURE — 25000003 PHARM REV CODE 250: Performed by: NURSE ANESTHETIST, CERTIFIED REGISTERED

## 2022-11-16 PROCEDURE — 37000008 HC ANESTHESIA 1ST 15 MINUTES: Performed by: ORTHOPAEDIC SURGERY

## 2022-11-16 PROCEDURE — 63600175 PHARM REV CODE 636 W HCPCS: Performed by: NURSE ANESTHETIST, CERTIFIED REGISTERED

## 2022-11-16 PROCEDURE — 87075 CULTR BACTERIA EXCEPT BLOOD: CPT | Performed by: ORTHOPAEDIC SURGERY

## 2022-11-16 RX ORDER — PROCHLORPERAZINE EDISYLATE 5 MG/ML
5 INJECTION INTRAMUSCULAR; INTRAVENOUS EVERY 30 MIN PRN
Status: CANCELLED | OUTPATIENT
Start: 2022-11-16

## 2022-11-16 RX ORDER — OXYCODONE HYDROCHLORIDE 5 MG/1
5 TABLET ORAL
Status: CANCELLED | OUTPATIENT
Start: 2022-11-16

## 2022-11-16 RX ORDER — SODIUM CHLORIDE 0.9 % (FLUSH) 0.9 %
3 SYRINGE (ML) INJECTION
Status: CANCELLED | OUTPATIENT
Start: 2022-11-16

## 2022-11-16 RX ORDER — PROPOFOL 10 MG/ML
VIAL (ML) INTRAVENOUS
Status: DISCONTINUED | OUTPATIENT
Start: 2022-11-16 | End: 2022-11-16

## 2022-11-16 RX ORDER — CLINDAMYCIN PHOSPHATE 900 MG/50ML
900 INJECTION, SOLUTION INTRAVENOUS
Status: COMPLETED | OUTPATIENT
Start: 2022-11-16 | End: 2022-11-16

## 2022-11-16 RX ORDER — PROPOFOL 10 MG/ML
VIAL (ML) INTRAVENOUS CONTINUOUS PRN
Status: DISCONTINUED | OUTPATIENT
Start: 2022-11-16 | End: 2022-11-16

## 2022-11-16 RX ORDER — ROPIVACAINE HYDROCHLORIDE 5 MG/ML
INJECTION, SOLUTION EPIDURAL; INFILTRATION; PERINEURAL
Status: COMPLETED | OUTPATIENT
Start: 2022-11-16 | End: 2022-11-16

## 2022-11-16 RX ORDER — BACITRACIN ZINC 500 UNIT/G
OINTMENT (GRAM) TOPICAL
Status: DISCONTINUED | OUTPATIENT
Start: 2022-11-16 | End: 2022-11-16 | Stop reason: HOSPADM

## 2022-11-16 RX ORDER — FENTANYL CITRATE 50 UG/ML
INJECTION, SOLUTION INTRAMUSCULAR; INTRAVENOUS
Status: DISCONTINUED | OUTPATIENT
Start: 2022-11-16 | End: 2022-11-16

## 2022-11-16 RX ORDER — LIDOCAINE HYDROCHLORIDE 20 MG/ML
INJECTION INTRAVENOUS
Status: DISCONTINUED | OUTPATIENT
Start: 2022-11-16 | End: 2022-11-16

## 2022-11-16 RX ORDER — MIDAZOLAM HYDROCHLORIDE 1 MG/ML
INJECTION INTRAMUSCULAR; INTRAVENOUS
Status: DISCONTINUED | OUTPATIENT
Start: 2022-11-16 | End: 2022-11-16

## 2022-11-16 RX ORDER — LIDOCAINE HYDROCHLORIDE 10 MG/ML
1 INJECTION, SOLUTION EPIDURAL; INFILTRATION; INTRACAUDAL; PERINEURAL ONCE
Status: DISCONTINUED | OUTPATIENT
Start: 2022-11-16 | End: 2022-11-16 | Stop reason: HOSPADM

## 2022-11-16 RX ORDER — ACETAMINOPHEN 500 MG
1000 TABLET ORAL
Status: COMPLETED | OUTPATIENT
Start: 2022-11-16 | End: 2022-11-16

## 2022-11-16 RX ORDER — HYDROCODONE BITARTRATE AND ACETAMINOPHEN 5; 325 MG/1; MG/1
1 TABLET ORAL EVERY 4 HOURS PRN
Status: DISCONTINUED | OUTPATIENT
Start: 2022-11-16 | End: 2022-11-16 | Stop reason: HOSPADM

## 2022-11-16 RX ORDER — HYDROMORPHONE HYDROCHLORIDE 2 MG/ML
0.4 INJECTION, SOLUTION INTRAMUSCULAR; INTRAVENOUS; SUBCUTANEOUS EVERY 5 MIN PRN
Status: CANCELLED | OUTPATIENT
Start: 2022-11-16

## 2022-11-16 RX ORDER — DIPHENHYDRAMINE HYDROCHLORIDE 50 MG/ML
12.5 INJECTION INTRAMUSCULAR; INTRAVENOUS EVERY 30 MIN PRN
Status: CANCELLED | OUTPATIENT
Start: 2022-11-16

## 2022-11-16 RX ADMIN — FENTANYL CITRATE 100 MCG: 50 INJECTION, SOLUTION INTRAMUSCULAR; INTRAVENOUS at 09:11

## 2022-11-16 RX ADMIN — MIDAZOLAM HYDROCHLORIDE 2 MG: 1 INJECTION, SOLUTION INTRAMUSCULAR; INTRAVENOUS at 09:11

## 2022-11-16 RX ADMIN — LIDOCAINE HYDROCHLORIDE 50 MG: 20 INJECTION, SOLUTION INTRAVENOUS at 09:11

## 2022-11-16 RX ADMIN — ROPIVACAINE HYDROCHLORIDE 30 ML: 5 INJECTION, SOLUTION EPIDURAL; INFILTRATION; PERINEURAL at 09:11

## 2022-11-16 RX ADMIN — PROPOFOL 75 MCG/KG/MIN: 10 INJECTION, EMULSION INTRAVENOUS at 09:11

## 2022-11-16 RX ADMIN — CLINDAMYCIN PHOSPHATE 900 MG: 18 INJECTION, SOLUTION INTRAVENOUS at 09:11

## 2022-11-16 RX ADMIN — ACETAMINOPHEN 1000 MG: 500 TABLET, FILM COATED ORAL at 08:11

## 2022-11-16 RX ADMIN — PROPOFOL 60 MG: 10 INJECTION, EMULSION INTRAVENOUS at 09:11

## 2022-11-16 RX ADMIN — SODIUM CHLORIDE, SODIUM LACTATE, POTASSIUM CHLORIDE, AND CALCIUM CHLORIDE: .6; .31; .03; .02 INJECTION, SOLUTION INTRAVENOUS at 08:11

## 2022-11-16 NOTE — OP NOTE
OPERATIVE NOTE     DATE OF PROCEDURE:  11.16.22     PREOPERATIVE DIAGNOSIS:           Left olecranon bursitis    POSTOPERATIVE DIAGNOSIS:         Left olecranon bursitis     PROCEDURE:              Left olecranon bursectomy  Skin excision with Wound closure  Long arm Splint placement     SURGEON:       SVETA Pham MD     ASSISTANT:                 YASMINE Sanchez MD     ANESTHESIA:              Regional     EBL:      5 cc     Fluids:  800 crystalloid     COMPLICATIONS:  None     IMPLANTS:       None     SPECIMENS:    Culture x2  Pathology x1      Indications procedure: Marti Coley is a 66 y.o. female who presented to our clinic for evaluation of left olecranon bursitis.  She would failed conservative management.  We discussed risks benefits alternatives surgery, the patient like to proceed with left olecranon bursectomy.  Informed consent obtained.      OPERATIVE PROCEDURE:  Patient met in the preoperative hold area and the correct site and side of surgery being the LUE were marked and verified.  Patient brought back to the operative suite.  Regional anesthesia smoothly induced.  Patient in supine position. All bony prominences were appropriately padded.  Patient received clindamycin for preoperative antibiotics.  The LUE was then prepped and draped in normal sterile fashion.    Time-out was performed verifying the correct patient, site/side of surgery, surgical consent, radiographs as applicable, preop antibiotics, necessary equipment, anticipated blood loss, length of procedure, postoperative disposition.      Began by making a 5 cm longitudinal incision over the radial aspect of the olecranon.  We immediately encountered debbie colored fluid, there was no purulence p.r.n. but we went ahead and collected 2 samples of the fluid, we sent this off to the lab for analysis.  Additionally we took a pathology sample and sent this as well.  We then bluntly dissected around the olecranon bursa.  Care was taken to protect any  superficial nerves that were encountered.  We then sharply excised the olecranon bursa working away from proximal to distal in and around the medial aspect of the olecranon.  Once we are satisfied with our bursectomy, we used a rongeur to remove any free fragments that were still attached.    We then excised 5 mm of skin to aid in wound closure.  We then irrigated the wound copiously with normal saline.    At the conclusion of procedure the patient had soft and compressible compartments, brisk cap refill, palpable 2+ Radial pulse in the operative extremity.    Prior to final closure all counts were confirmed to be correct.  Patient tolerated the procedure well without any complications, was awoken from anesthesia, transferred PACU for further recovery.    POSTOPERATIVE PLAN:    Follow-up 2 weeks for wound check  Nonweightbearing left upper extremity  Begin physical therapy at 2 weeks, order placed.   Follow-up pathology results

## 2022-11-16 NOTE — DISCHARGE SUMMARY
[facility] - [facility] ([facility])  Discharge Note  Short Stay    Procedure(s) (LRB):  BURSECTOMY, ELBOW,LEFT (Left)      OUTCOME: Patient tolerated treatment/procedure well without complication and is now ready for discharge.    DISPOSITION: Home or Self Care    FINAL DIAGNOSIS:  Olecranon bursitis of left elbow    FOLLOWUP: In clinic    DISCHARGE INSTRUCTIONS:    Discharge Procedure Orders   Ambulatory referral/consult to Physical/Occupational Therapy   Standing Status: Future   Referral Priority: Routine Referral Type: Physical Medicine   Referral Reason: Specialty Services Required   Number of Visits Requested: 1     Diet general     Call MD for:  temperature >100.4     Call MD for:  persistent nausea and vomiting     Call MD for:  severe uncontrolled pain     Call MD for:  difficulty breathing, headache or visual disturbances     Call MD for:  redness, tenderness, or signs of infection (pain, swelling, redness, odor or green/yellow discharge around incision site)     Call MD for:  hives     Call MD for:  persistent dizziness or light-headedness     Call MD for:  extreme fatigue     No driving, operating heavy equipment or signing legal documents while taking pain medication     Leave dressing on - Keep it clean, dry, and intact until clinic visit

## 2022-11-16 NOTE — PLAN OF CARE
Marti Coley has met all discharge criteria from Phase II. Vital Signs are stable, ambulating  without difficulty. Discharge instructions given, patient verbalized understanding. Discharged from facility via wheelchair in stable condition.

## 2022-11-16 NOTE — ANESTHESIA PROCEDURE NOTES
Peripheral Block    Patient location during procedure: pre-op   Block not for primary anesthetic.  Reason for block: at surgeon's request and post-op pain management   Post-op Pain Location: left   Start time: 11/16/2022 9:01 AM  Timeout: 11/16/2022 9:00 AM   End time: 11/16/2022 9:11 AM    Staffing  Authorizing Provider: Elijah Prieto MD  Performing Provider: Elijah Prieto MD    Preanesthetic Checklist  Completed: patient identified, IV checked, site marked, risks and benefits discussed, surgical consent, monitors and equipment checked, pre-op evaluation and timeout performed  Peripheral Block  Patient position: supine  Prep: ChloraPrep  Patient monitoring: heart rate, cardiac monitor, continuous pulse ox, continuous capnometry and frequent blood pressure checks  Block type: supraclavicular  Laterality: left  Injection technique: single shot  Needle  Needle type: Stimuplex   Needle gauge: 21 G  Needle length: 4 in  Needle localization: anatomical landmarks and ultrasound guidance   -ultrasound image captured on disc.  Assessment  Injection assessment: negative aspiration, negative parasthesia and local visualized surrounding nerve  Paresthesia pain: none  Heart rate change: no  Slow fractionated injection: yes  Pain Tolerance: comfortable throughout block  Medications:    Medications: ropivacaine (NAROPIN) injection 0.5% - Perineural   30 mL - 11/16/2022 9:05:00 AM    Additional Notes  VSS.  [facility] RN monitoring vitals throughout procedure.  Patient tolerated procedure well.

## 2022-11-16 NOTE — ANESTHESIA POSTPROCEDURE EVALUATION
Anesthesia Post Evaluation    Patient: Marti Coley    Procedure(s) Performed: Procedure(s) (LRB):  BURSECTOMY, ELBOW,LEFT (Left)    Final Anesthesia Type: regional      Patient location during evaluation: [facility]  Patient participation: Yes- Able to Participate  Level of consciousness: awake and alert  Post-procedure vital signs: reviewed and stable  Pain management: adequate  Airway patency: patent    PONV status at discharge: No PONV  Anesthetic complications: no      Cardiovascular status: blood pressure returned to baseline and hemodynamically stable  Respiratory status: unassisted, spontaneous ventilation and room air  Hydration status: euvolemic  Follow-up not needed.          Vitals Value    /62    Temp 36.7 °C (98 °F)    Pulse 82    Resp 18    SpO2 97 %          No case tracking events are documented in the log.      Pain/Zackery Score: Pain Rating Prior to Med Admin: -- (preop) (11/16/2022  8:12 AM)

## 2022-11-17 VITALS
HEART RATE: 85 BPM | SYSTOLIC BLOOD PRESSURE: 147 MMHG | WEIGHT: 171 LBS | OXYGEN SATURATION: 99 % | RESPIRATION RATE: 18 BRPM | HEIGHT: 63 IN | TEMPERATURE: 98 F | BODY MASS INDEX: 30.3 KG/M2 | DIASTOLIC BLOOD PRESSURE: 76 MMHG

## 2022-11-19 LAB — BACTERIA SPEC AEROBE CULT: NO GROWTH

## 2022-11-23 LAB — BACTERIA SPEC ANAEROBE CULT: NORMAL

## 2022-11-25 ENCOUNTER — PATIENT MESSAGE (OUTPATIENT)
Dept: ORTHOPEDICS | Facility: CLINIC | Age: 67
End: 2022-11-25
Payer: COMMERCIAL

## 2022-11-25 LAB
FINAL PATHOLOGIC DIAGNOSIS: NORMAL
GROSS: NORMAL
Lab: NORMAL

## 2022-11-28 ENCOUNTER — OFFICE VISIT (OUTPATIENT)
Dept: ORTHOPEDICS | Facility: CLINIC | Age: 67
End: 2022-11-28
Payer: COMMERCIAL

## 2022-11-28 VITALS — BODY MASS INDEX: 30.3 KG/M2 | HEIGHT: 63 IN | WEIGHT: 171 LBS

## 2022-11-28 DIAGNOSIS — Z47.89 ENCOUNTER FOR CAST CHANGE: Primary | ICD-10-CM

## 2022-11-28 PROCEDURE — 3008F BODY MASS INDEX DOCD: CPT | Mod: CPTII,S$GLB,, | Performed by: PHYSICIAN ASSISTANT

## 2022-11-28 PROCEDURE — 29105 APPLICATION LONG ARM SPLINT: CPT | Mod: 58,LT,S$GLB, | Performed by: PHYSICIAN ASSISTANT

## 2022-11-28 PROCEDURE — 99024 POSTOP FOLLOW-UP VISIT: CPT | Mod: S$GLB,,, | Performed by: PHYSICIAN ASSISTANT

## 2022-11-28 PROCEDURE — 99999 PR PBB SHADOW E&M-EST. PATIENT-LVL III: ICD-10-PCS | Mod: PBBFAC,,, | Performed by: PHYSICIAN ASSISTANT

## 2022-11-28 PROCEDURE — 29105 PR APPLY LONG ARM SPLINT: ICD-10-PCS | Mod: 58,LT,S$GLB, | Performed by: PHYSICIAN ASSISTANT

## 2022-11-28 PROCEDURE — 1159F PR MEDICATION LIST DOCUMENTED IN MEDICAL RECORD: ICD-10-PCS | Mod: CPTII,S$GLB,, | Performed by: PHYSICIAN ASSISTANT

## 2022-11-28 PROCEDURE — 3044F HG A1C LEVEL LT 7.0%: CPT | Mod: CPTII,S$GLB,, | Performed by: PHYSICIAN ASSISTANT

## 2022-11-28 PROCEDURE — 3008F PR BODY MASS INDEX (BMI) DOCUMENTED: ICD-10-PCS | Mod: CPTII,S$GLB,, | Performed by: PHYSICIAN ASSISTANT

## 2022-11-28 PROCEDURE — 99024 PR POST-OP FOLLOW-UP VISIT: ICD-10-PCS | Mod: S$GLB,,, | Performed by: PHYSICIAN ASSISTANT

## 2022-11-28 PROCEDURE — 99999 PR PBB SHADOW E&M-EST. PATIENT-LVL III: CPT | Mod: PBBFAC,,, | Performed by: PHYSICIAN ASSISTANT

## 2022-11-28 PROCEDURE — 3044F PR MOST RECENT HEMOGLOBIN A1C LEVEL <7.0%: ICD-10-PCS | Mod: CPTII,S$GLB,, | Performed by: PHYSICIAN ASSISTANT

## 2022-11-28 PROCEDURE — 1159F MED LIST DOCD IN RCRD: CPT | Mod: CPTII,S$GLB,, | Performed by: PHYSICIAN ASSISTANT

## 2022-11-28 NOTE — TELEPHONE ENCOUNTER
Spoke with pt and she will come in today for a splint change.  Pt stated her splint feel too loose.

## 2022-11-28 NOTE — PROGRESS NOTES
Pt presents for splint change. She reports she has a little motion of the elbow and the splint and also pushing on the ulnar hand. S/p below surgery on 11/16/22     PROCEDURE:              Left olecranon bursectomy  Skin excision with Wound closure  Long arm Splint placement     SURGEON:       SVETA Pham MD    New left long arm plaster splint applied   RTC for 2 wk PO

## 2022-12-02 ENCOUNTER — OFFICE VISIT (OUTPATIENT)
Dept: ORTHOPEDICS | Facility: CLINIC | Age: 67
End: 2022-12-02
Payer: MEDICARE

## 2022-12-02 VITALS
HEIGHT: 63 IN | BODY MASS INDEX: 30.3 KG/M2 | HEART RATE: 87 BPM | WEIGHT: 171 LBS | DIASTOLIC BLOOD PRESSURE: 79 MMHG | SYSTOLIC BLOOD PRESSURE: 123 MMHG

## 2022-12-02 DIAGNOSIS — Z47.89 ORTHOPEDIC AFTERCARE: ICD-10-CM

## 2022-12-02 DIAGNOSIS — M70.22 OLECRANON BURSITIS OF LEFT ELBOW: Primary | ICD-10-CM

## 2022-12-02 PROCEDURE — 99213 OFFICE O/P EST LOW 20 MIN: CPT | Mod: PBBFAC | Performed by: PHYSICIAN ASSISTANT

## 2022-12-02 PROCEDURE — 99024 PR POST-OP FOLLOW-UP VISIT: ICD-10-PCS | Mod: POP,,, | Performed by: PHYSICIAN ASSISTANT

## 2022-12-02 PROCEDURE — 99999 PR PBB SHADOW E&M-EST. PATIENT-LVL III: ICD-10-PCS | Mod: PBBFAC,,, | Performed by: PHYSICIAN ASSISTANT

## 2022-12-02 PROCEDURE — 99999 PR PBB SHADOW E&M-EST. PATIENT-LVL III: CPT | Mod: PBBFAC,,, | Performed by: PHYSICIAN ASSISTANT

## 2022-12-02 PROCEDURE — 99024 POSTOP FOLLOW-UP VISIT: CPT | Mod: POP,,, | Performed by: PHYSICIAN ASSISTANT

## 2022-12-02 NOTE — PROGRESS NOTES
"Ms. Coley is here today for a post-operative visit.  She is 16 days status post left olecranon bursectomy with skin excision for wound closure by Dr. Freitas on 11/16/2022. She reports that she is doing well.  Pain is tolerable, improved since the splint was removed.  She denies fever, chills, and sweats since the time of the surgery.     PATHOLOGY:  "left olecranon bursa", excision:       - Consistent with chronic bursitis    MICROBIOLOGY:  Aerobic Bacterial Culture No growth      Gram Stain Result Rare WBC's    Gram Stain Result No organisms seen      Anaerobic Culture No anaerobes isolated      Fungus (Mycology) Culture Culture in progress        Physical exam:    Vitals:    12/02/22 1051   BP: 123/79   Pulse: 87   Weight: 77.6 kg (171 lb)   Height: 5' 3" (1.6 m)   PainSc:   3     Vital signs are stable, patient is afebrile.  Patient is well dressed and well groomed, no acute distress.  Alert and oriented to person, place, and time.  Post op dressing taken down.  Incision is clean, dry, and intact.  There is no erythema or exudate.  There is no sign of any infection. She is NVI. Sutures removed without difficulty; Steri-Strips applied for reinforcement over the elbow incision.  Good finger and wrist range of motion.    Assessment: 16 days status post left olecranon bursectomy with skin excision for wound closure    Plan:  Marti was seen today for post-op evaluation.    Diagnoses and all orders for this visit:    Olecranon bursitis of left elbow    Orthopedic aftercare      - PO instruction reviewed and provided to patient  - ACE wrap provided  - OT as scheduled  - Discussed scar massage  - Follow up in 4-6 weeks   - Call with questions or concerns    Per OP note: POSTOPERATIVE PLAN:  Follow-up 2 weeks for wound check  Nonweightbearing left upper extremity  Begin physical therapy at 2 weeks, order placed.   Follow-up pathology results    "

## 2022-12-05 ENCOUNTER — CLINICAL SUPPORT (OUTPATIENT)
Dept: REHABILITATION | Facility: OTHER | Age: 67
End: 2022-12-05
Payer: MEDICARE

## 2022-12-05 DIAGNOSIS — M70.32 BURSITIS OF LEFT ELBOW, UNSPECIFIED BURSA: ICD-10-CM

## 2022-12-05 DIAGNOSIS — R29.898 WEAKNESS OF LEFT UPPER EXTREMITY: ICD-10-CM

## 2022-12-05 DIAGNOSIS — M25.622 DECREASED RANGE OF MOTION OF LEFT ELBOW: ICD-10-CM

## 2022-12-05 PROCEDURE — 97161 PT EVAL LOW COMPLEX 20 MIN: CPT | Mod: PN | Performed by: PHYSICAL THERAPIST

## 2022-12-05 PROCEDURE — 97110 THERAPEUTIC EXERCISES: CPT | Mod: PN | Performed by: PHYSICAL THERAPIST

## 2022-12-06 ENCOUNTER — PATIENT MESSAGE (OUTPATIENT)
Dept: REHABILITATION | Facility: OTHER | Age: 67
End: 2022-12-06
Payer: MEDICARE

## 2022-12-06 PROBLEM — M25.622 DECREASED RANGE OF MOTION OF LEFT ELBOW: Status: ACTIVE | Noted: 2022-12-06

## 2022-12-06 PROBLEM — R29.898 WEAKNESS OF LEFT UPPER EXTREMITY: Status: ACTIVE | Noted: 2022-12-06

## 2022-12-06 NOTE — PLAN OF CARE
[facility]   Physical Therapy Initial Evaluation     Date: 12/5/2022   Name: Marti Coley  Clinic Number: [id number]    Therapy Diagnosis:   Encounter Diagnoses   Name Primary?    Bursitis of left elbow, unspecified bursa     Decreased range of motion of left elbow     Weakness of left upper extremity      Physician: Abhishek Sanchez, *    Physician Orders: PT Eval and Treat   Medical Diagnosis from Referral: M70.32 (ICD-10-CM) - Bursitis of left elbow, unspecified bursa  Evaluation Date: 12/5/2022  Authorization Period Expiration: 12/31/2022  Plan of Care Expiration: 3/5/2023  Progress Note Due: 1/5/2023  Visit # / Visits authorized: 1/ 1   FOTO: 1/5    Precautions: Standard and NWB     Time In: 320 pm  Time Out: 4:00 pm  Total Appointment Time (timed & untimed codes): 40 minutes      SUBJECTIVE     Date of onset: 11/16/2022    PROCEDURE:              Left olecranon bursectomy  Skin excision with Wound closure  Long arm Splint placement    History of current condition - Marti reports: F/u with orthopedics Friday to remove plaster splint. Reports some discomfort L wrist since being the the sling and not feel confident with driving yet. Minimal pain L elbow and avoiding activities that are painful (weight bearing, extension and supination). Pt slowly improving and was able to reach behind her back to hook her bra this morning. Denies any N/T.     Falls: none    Imaging, imaging pre op per chart review    FINDINGS:  No acute fracture or dislocation identified.  No elbow joint effusion.     Progressive soft tissue thickening and edema along the posterior aspect of the elbow.     Impression:     Please see above.       Prior Therapy: yes, for low back/ L leg, for wrist  Social History:  lives with their spouse  Occupation: [employer]    Prior Level of Function: independent with ADL's and driving   Current Level of Function: unable to drive, difficulty  with personal care/ dressing, unable to lift    Pain:  Current 3/10, worst 5/10, best 2/10   Location: left elbow and wrist  Description: Aching  Aggravating Factors: activity  Easing Factors:tylenol , rest    Patients goals: In the immediate future, she'd like to be able to drive. Long term goal is to return to aerobic fitness classes     Medical History:   Past Medical History:   Diagnosis Date    Allergy     Breast cancer 2008    left diagnosed in 2008 - underwent chemotherapy and radiation after lumpectomy    Cancer 4/2008    left IDC, neoadjuvant chemo, 7mm residual IDC lumpectomy, negative Sn biopsy, adjuvant XRT and hormonal therapy with tamoxifen, ER/[place]+, HER-2 negtive    Genetic testing 4/28/2008     negative MultiSite BRACAnalysis    HTN (hypertension)     Hyperlipidemia     Invasive lobular carcinoma of breast, stage 1 7/11/2012    Osteoarthritis        Surgical History:   Marti Coley  has a past surgical history that includes Endometrial biopsy (05/27/2011); Breast surgery (12/2008); Breast lumpectomy (Left, 2008); Colonoscopy (N/A, 07/22/2022); Cataract extraction w/  intraocular lens implant (Right); and Bursectomy of elbow (Left, 11/16/2022).    Medications:   Marti has a current medication list which includes the following prescription(s): amlodipine, celecoxib, clobetasol 0.05%, clotrimazole-betamethasone 1-0.05%, ergocalciferol (vitamin d2), meloxicam, fish oil-omega-3 fatty acids, and tramadol.    Allergies:   Review of patient's allergies indicates:   Allergen Reactions    Ciprofloxacin Rash    Penicillins Rash          OBJECTIVE     Observation: presents to clinic without immobilizer donned ambulating with minimal arm swing  Integumentary: steri-strip intact and removed per provider request. Incision clean and dry without any drainage       Shoulder Range of Motion:   Shoulder Left Right   Flexion WFL WFL   ER Unable- limited elbow flexion C7   IR L1 level T10     Elbow  Right   Left   "Pain/Dysfunction with Movement    AROM PROM MMT AROM PROM MMT    flexion 140 145 5 100 110 NT    extension 0 0 5 -20 -15* NT Lacking extension   pronation 60 65 5 80 85 NT    supination 60 65 5 28 40* NT        Upper Extremity Strength  (R) UE  (L) UE    Shoulder flexion: 5/5 Shoulder flexion: 4+/5   Shoulder Abduction: 5/5 Shoulder abduction: 4+/5   Shoulder ER 5/5 Shoulder ER 4-/5   Shoulder IR 5/5 Shoulder IR 4+/5   Wrist flexion: 5/5 Wrist flexion: 4/5   Wrist extension: 5/5 Wrist extension: 4+/5       Palpation: TTP to olecranon       Sensation: BUE grossly intact to LT    Flexibility: decreased flexibility L biceps       Limitation/Restriction for FOTO Elbow Survey    Therapist reviewed FOTO scores for Marti Coley on 12/5/2022.   FOTO documents entered into [employer] - see Media section.    Limitation Score: 62%         TREATMENT     Total Treatment time (time-based codes) separate from Evaluation: 15 minutes      Marti received the treatments listed below:      therapeutic exercises to develop ROM and flexibility for 15 minutes including:  Scapula retractions x 10  AROM wrist flexion 2 x 10  AROM wrist extension 2 x 10  Pronation/ supination AROM x 20  Radial and ulnar deviation- stopped 2/2 paini  Elbow flexion and extension in supine with towel under humerus 5" holds  PROM elbow extension with PT      PATIENT EDUCATION AND HOME EXERCISES     Education provided:   - Education in HEP to start gentle hand, wrist and elbow Range of Motion. Educ on self scar massage and icing with arm elevated.   -Education in post op precautions including NWB    Written Home Exercises Provided: yes. Exercises were reviewed and Marti was able to demonstrate them prior to the end of the session.  Marti demonstrated good  understanding of the education provided. See EMR under Patient Instructions for exercises provided during therapy sessions.    ASSESSMENT     Marti is a 67 y.o. female referred to outpatient " Physical Therapy with a medical diagnosis of Bursitis of left elbow s/p bursectomy 11/16/2022. Patient presents with decreased Range of Motion, weakness, edema, decreased scar mobility, and pain limiting ADL's.     Patient prognosis is Good.   Patient will benefit from skilled outpatient Physical Therapy to address the deficits stated above and in the chart below, provide patient /family education, and to maximize patientt's level of independence.     Plan of care discussed with patient: Yes  Patient's spiritual, cultural and educational needs considered and patient is agreeable to the plan of care and goals as stated below:     Anticipated Barriers for therapy: none    Medical Necessity is demonstrated by the following  History  Co-morbidities and personal factors that may impact the plan of care Co-morbidities:   Breast cancer L    Personal Factors:   no deficits     moderate   Examination  Body Structures and Functions, activity limitations and participation restrictions that may impact the plan of care Body Regions:   upper extremities    Body Systems:    gross symmetry  ROM  strength  edema    Participation Restrictions:   Dressing, driving, recreation    Activity limitations:   Learning and applying knowledge  no deficits    General Tasks and Commands  no deficits    Communication  no deficits    Mobility  lifting and carrying objects  driving (bike, car, motorcycle)    Self care  caring for body parts (brushing teeth, shaving, grooming)  dressing    Domestic Life  shopping  cooking  doing house work (cleaning house, washing dishes, laundry)    Interactions/Relationships  no deficits    Life Areas  employment    Community and Social Life  community life  recreation and leisure         high   Clinical Presentation stable and uncomplicated low   Decision Making/ Complexity Score: low     Goals:  Short Term Goals (6 Weeks):   1. Patient will increase L Elbow flexion AROM to 140 degrees to improve functional  reach  2. Patient will increase L Elbow extension to 0 degrees to improve overhead reach  3. Patient to report < or =2/10 pain in L Elbow with ADL's  4. Patient to be able to drive without limitation or difficulty   Long Term Goals (12 Weeks):   1. Patient to have decreased subjective report of disability as noted by a score of <40% on the FOTO elbow questionnaire   2. Patient to be independent with home exercise program for improved self management of condition  3. Patient will increase strength in L upper extremity to 4+/5 or greater for return to recreation  4. Patient to be able to carry 7# 80 ft with little to no difficulty for ease with household chores and shopping       PLAN   Plan of care Certification: 12/5/2022 to 3/5/2023.    Outpatient Physical Therapy 2 times weekly for 12 weeks to include the following interventions: Gait Training, Manual Therapy, Moist Heat/ Ice, Neuromuscular Re-ed, Paraffin, Patient Education, Therapeutic Activities, and Therapeutic Exercise.     Sandy Biggs, PT      I CERTIFY THE NEED FOR THESE SERVICES FURNISHED UNDER THIS PLAN OF TREATMENT AND WHILE UNDER MY CARE   Physician's comments:     Physician's Signature: ___________________________________________________

## 2022-12-09 ENCOUNTER — CLINICAL SUPPORT (OUTPATIENT)
Dept: REHABILITATION | Facility: OTHER | Age: 67
End: 2022-12-09
Payer: MEDICARE

## 2022-12-09 DIAGNOSIS — R29.898 WEAKNESS OF LEFT UPPER EXTREMITY: ICD-10-CM

## 2022-12-09 DIAGNOSIS — M25.522 LEFT ELBOW PAIN: ICD-10-CM

## 2022-12-09 DIAGNOSIS — M25.622 DECREASED RANGE OF MOTION OF LEFT ELBOW: Primary | ICD-10-CM

## 2022-12-09 PROCEDURE — 97110 THERAPEUTIC EXERCISES: CPT | Mod: PN

## 2022-12-09 PROCEDURE — 97140 MANUAL THERAPY 1/> REGIONS: CPT | Mod: PN

## 2022-12-09 NOTE — PROGRESS NOTES
"[facility]   Physical Therapy Treatment Note     Name: Marti Coley  Clinic Number: [id number]    Therapy Diagnosis:   Encounter Diagnoses   Name Primary?    Decreased range of motion of left elbow Yes    Weakness of left upper extremity     Left elbow pain      Physician: Marilu Edwards NP    Visit Date: 12/9/2022    Physician Orders: PT Evaluate and Treat   Medical Diagnosis: M70.32 (ICD-10-CM) - Bursitis of left elbow, unspecified bursa  Evaluation Date: 12/5/22  Authorization Period Expiration: 12/31/22  Plan of Care Certification Period: 12/5/22- 3/5/23  Progress Note Due: 1/5/23    Visit # / Visits authorized: 1/ TBD   FOTO: 1/ 3       Precautions: NWB L UE, only able to lift 2#'s with L UE at this time    Time In: 1000am  Time Out: 1100am  Total Billable Time: 60 minutes      SUBJECTIVE     Pt reports: that she is doing well today. Pt states that she is happy to be out of her splint. Pt reports minimal soreness following her initial evaluation.   She was compliant with home exercise program.  Response to previous treatment: mild soreness   Functional change: improved extension AROM    Pain: 4/10  Location: left elbow      OBJECTIVE     Objective Measures updated at progress report unless specified.       TREATMENT     Total Treatment time (time-based codes) separate from Evaluation: 60 minutes     Marti received the treatments listed below:      Patient received therapeutic exercises for 52 minutes for improved strength and AROM including:  Scapula retractions x 10  L AROM wrist flexion 2 x 10 vs 1# DB  L AROM wrist extension 2 x 10 vs 1# DB  L Pronation/ supination AROM x 20  L Radial and ulnar deviation vs 1# 3x10 repetitions  Elbow flexion and extension in supine with towel under humerus 5" holds  +Supine elbow extension prop stretch with 1# weight x45 seconds  + vs red web 30x 5" hold  +Finger extension vs red web 30x 5" hold  +L shoulder flexion AROM with towel " on wall 30x               Patient received manual therapeutic technique for 8 minutes for improved soft tissue and joint mobility including:  PROM to L elbow in all planes           PATIENT EDUCATION AND HOME EXERCISES     Home Exercises Provided and Patient Education Provided     Education provided:   PT educated pt on importance of compliance with their HEP this visit.     Written Home Exercises Provided: Patient instructed to cont prior HEP. Exercises were reviewed and Marti was able to demonstrate them prior to the end of the session.  Marti demonstrated good  understanding of the education provided. See EMR under Patient Instructions for exercises provided during therapy sessions    ASSESSMENT   Pt tolerated tx session well today and completed all therapeutic exercises with minimal/no increase in left elbow pain. Progressed left elbow strengthening exercises this visit.     Marti Is progressing well towards her goals.   Pt prognosis is Good.     Pt will continue to benefit from skilled outpatient physical therapy to address the deficits listed in the problem list box on initial evaluation, provide pt/family education and to maximize pt's level of independence in the home and community environment.     Pt's spiritual, cultural and educational needs considered and pt agreeable to plan of care and goals.     Anticipated barriers to physical therapy: None      Goals:  Short Term Goals (6 Weeks):   1. Patient will increase L Elbow flexion AROM to 140 degrees to improve functional reach  2. Patient will increase L Elbow extension to 0 degrees to improve overhead reach  3. Patient to report < or =2/10 pain in L Elbow with ADL's  4. Patient to be able to drive without limitation or difficulty   Long Term Goals (12 Weeks):   1. Patient to have decreased subjective report of disability as noted by a score of <40% on the FOTO elbow questionnaire   2. Patient to be independent with home exercise program for  improved self management of condition  3. Patient will increase strength in L upper extremity to 4+/5 or greater for return to recreation  4. Patient to be able to carry 7# 80 ft with little to no difficulty for ease with household chores and shopping     PLAN   Plan of care Certification: 12/5/2022 to 3/5/2023.     Outpatient Physical Therapy 2 times weekly for 12 weeks to include the following interventions: Gait Training, Manual Therapy, Moist Heat/ Ice, Neuromuscular Re-ed, Paraffin, Patient Education, Therapeutic Activities, and Therapeutic Exercise.          Zofia Jarrett, PT

## 2022-12-12 ENCOUNTER — CLINICAL SUPPORT (OUTPATIENT)
Dept: REHABILITATION | Facility: OTHER | Age: 67
End: 2022-12-12
Payer: MEDICARE

## 2022-12-12 DIAGNOSIS — M25.522 LEFT ELBOW PAIN: ICD-10-CM

## 2022-12-12 DIAGNOSIS — M25.622 DECREASED RANGE OF MOTION OF LEFT ELBOW: Primary | ICD-10-CM

## 2022-12-12 DIAGNOSIS — R29.898 WEAKNESS OF LEFT UPPER EXTREMITY: ICD-10-CM

## 2022-12-12 PROCEDURE — 97110 THERAPEUTIC EXERCISES: CPT | Mod: PN

## 2022-12-12 PROCEDURE — 97530 THERAPEUTIC ACTIVITIES: CPT | Mod: PN

## 2022-12-12 PROCEDURE — 97140 MANUAL THERAPY 1/> REGIONS: CPT | Mod: PN

## 2022-12-13 NOTE — PROGRESS NOTES
"[facility]   Physical Therapy Treatment Note     Name: Marti Coley  Clinic Number: [id number]    Therapy Diagnosis:   Encounter Diagnoses   Name Primary?    Decreased range of motion of left elbow Yes    Weakness of left upper extremity     Left elbow pain      Physician: Marilu Edwards NP    Visit Date: 12/12/2022    Physician Orders: PT Evaluate and Treat   Medical Diagnosis: M70.32 (ICD-10-CM) - Bursitis of left elbow, unspecified bursa  Evaluation Date: 12/5/22  Authorization Period Expiration: 12/31/22  Plan of Care Certification Period: 12/5/22- 3/5/23  Progress Note Due: 1/5/23    Visit # / Visits authorized: 13/20  FOTO: 1/ 3       Precautions: NWB L UE, only able to lift 2#'s with L UE at this time    Time In: 0445pm  Time Out: 0545pm  Total Billable Time: 60 minutes      SUBJECTIVE     Pt reports: that she is doing well today. Pt states that she is able to straighten her left elbow with less pain compared to her previous session.   She was compliant with home exercise program.  Response to previous treatment: mild soreness   Functional change: improved extension AROM    Pain: 4/10  Location: left elbow      OBJECTIVE     Objective Measures updated at progress report unless specified.       TREATMENT     Total Treatment time (time-based codes) separate from Evaluation: 60 minutes     Marti received the treatments listed below:      Patient received therapeutic exercises for 44 minutes for improved strength and AROM including:  Scapula retractions x 10  L AROM wrist flexion 2 x 10 vs 1# DB  L AROM wrist extension 2 x 10 vs 1# DB  L Pronation/ supination AROM x 20  L Radial and ulnar deviation vs 1# 3x10 repetitions  Elbow flexion and extension in supine with towel under humerus 5" holds  Supine elbow extension prop stretch with 1# weight x45 seconds   vs red web 30x 5" hold  Finger extension vs red web 30x 5" hold  L shoulder flexion AROM with towel on wall 30x "             Patient received manual therapeutic technique for 8 minutes for improved soft tissue and joint mobility including:  PROM to L elbow in all planes     Pt received therapeutic activities for 8 minutes for improved tolerance to functional activities including:  UBE 3min/3min  Ball circles on wall for L elbow endurance 30x CW/ 30x CCW          PATIENT EDUCATION AND HOME EXERCISES     Home Exercises Provided and Patient Education Provided     Education provided:   PT educated pt on importance of compliance with their HEP this visit.     Written Home Exercises Provided: Patient instructed to cont prior HEP. Exercises were reviewed and Marti was able to demonstrate them prior to the end of the session.  Marti demonstrated good  understanding of the education provided. See EMR under Patient Instructions for exercises provided during therapy sessions    ASSESSMENT   Pt tolerated tx session well today and completed all therapeutic exercises with minimal/no increase in left elbow pain. Pt demonstrates improved L elbow extension AROM this week compared to her previous tx session. Continue PT POC with emphasis on improving pt's tolerance to overhead activity.     Marti Is progressing well towards her goals.   Pt prognosis is Good.     Pt will continue to benefit from skilled outpatient physical therapy to address the deficits listed in the problem list box on initial evaluation, provide pt/family education and to maximize pt's level of independence in the home and community environment.     Pt's spiritual, cultural and educational needs considered and pt agreeable to plan of care and goals.     Anticipated barriers to physical therapy: None      Goals:  Short Term Goals (6 Weeks):   1. Patient will increase L Elbow flexion AROM to 140 degrees to improve functional reach  2. Patient will increase L Elbow extension to 0 degrees to improve overhead reach  3. Patient to report < or =2/10 pain in L Elbow with  ADL's  4. Patient to be able to drive without limitation or difficulty   Long Term Goals (12 Weeks):   1. Patient to have decreased subjective report of disability as noted by a score of <40% on the FOTO elbow questionnaire   2. Patient to be independent with home exercise program for improved self management of condition  3. Patient will increase strength in L upper extremity to 4+/5 or greater for return to recreation  4. Patient to be able to carry 7# 80 ft with little to no difficulty for ease with household chores and shopping     PLAN   Plan of care Certification: 12/5/2022 to 3/5/2023.     Outpatient Physical Therapy 2 times weekly for 12 weeks to include the following interventions: Gait Training, Manual Therapy, Moist Heat/ Ice, Neuromuscular Re-ed, Paraffin, Patient Education, Therapeutic Activities, and Therapeutic Exercise.          Zofia Jarrett, PT

## 2022-12-14 ENCOUNTER — CLINICAL SUPPORT (OUTPATIENT)
Dept: REHABILITATION | Facility: OTHER | Age: 67
End: 2022-12-14
Payer: MEDICARE

## 2022-12-14 DIAGNOSIS — M25.522 LEFT ELBOW PAIN: ICD-10-CM

## 2022-12-14 DIAGNOSIS — M25.622 DECREASED RANGE OF MOTION OF LEFT ELBOW: Primary | ICD-10-CM

## 2022-12-14 DIAGNOSIS — R29.898 WEAKNESS OF LEFT UPPER EXTREMITY: ICD-10-CM

## 2022-12-14 PROCEDURE — 97110 THERAPEUTIC EXERCISES: CPT | Mod: PN

## 2022-12-14 PROCEDURE — 97530 THERAPEUTIC ACTIVITIES: CPT | Mod: PN

## 2022-12-14 PROCEDURE — 97140 MANUAL THERAPY 1/> REGIONS: CPT | Mod: PN

## 2022-12-14 NOTE — PROGRESS NOTES
"[facility]   Physical Therapy Treatment Note     Name: Marti Coley  Clinic Number: [id number]    Therapy Diagnosis:   Encounter Diagnoses   Name Primary?    Decreased range of motion of left elbow Yes    Weakness of left upper extremity     Left elbow pain      Physician: Marilu Edwards NP    Visit Date: 12/14/2022    Physician Orders: PT Evaluate and Treat   Medical Diagnosis: M70.32 (ICD-10-CM) - Bursitis of left elbow, unspecified bursa  Evaluation Date: 12/5/22  Authorization Period Expiration: 12/31/22  Plan of Care Certification Period: 12/5/22- 3/5/23  Progress Note Due: 1/5/23    Visit # / Visits authorized: 14/20  FOTO: 1/ 3       Precautions: NWB L UE, only able to lift 2#'s with L UE at this time    Time In: 0900am  Time Out: 1000am  Total Billable Time: 60 minutes      SUBJECTIVE     Pt reports: that she is doing well today. Pt states that she was unable to do her excises yesterday because she was sore from running errands and driving. Pt states that she is doing well overall. She notices that she need to work on her endurance. Pt states that she continues to be able to straighten her elbow more each day.   She was compliant with home exercise program.  Response to previous treatment: mild soreness   Functional change: improved extension AROM    Pain: 4/10  Location: left elbow      OBJECTIVE     Objective Measures updated at progress report unless specified.       TREATMENT     Total Treatment time (time-based codes) separate from Evaluation: 60 minutes     Marti received the treatments listed below:      Patient received therapeutic exercises for 44 minutes for improved strength and AROM including:  Scapula retractions 20x 5" hold with 1/2 foam roller  L AROM wrist flexion 2 x 10 vs 3# DB  L AROM wrist extension 2 x 10 vs 3# DB  L Pronation/ supination AROM x 20  L Radial and ulnar deviation vs 1# 3x10 repetitions  Elbow flexion and extension in supine with towel " "under humerus 5" holds  Supine elbow extension prop stretch with 1# weight x45 seconds   vs red web 30x 5" hold  Finger extension vs red web 30x 5" hold  L shoulder flexion AROM with towel on wall 30x   +L shoulder ER band walk outs vs griselda band 30x  +L shoulder IR band walk outs vs griselda band 30x  +Rows vs red TB 3x10 repetitions  +Bicep curls vs 3# 3X10 repetitions  +Towel wringing vs tan therabar 20x 5" hold  +U and Rainbows vs tan therabar 20x5" hold            Patient received manual therapeutic technique for 8 minutes for improved soft tissue and joint mobility including:  PROM to L elbow in all planes     Pt received therapeutic activities for 8 minutes for improved tolerance to functional activities including:  UBE 3min/3min  Ball circles on wall for L elbow endurance 30x CW/ 30x CCW          PATIENT EDUCATION AND HOME EXERCISES     Home Exercises Provided and Patient Education Provided     Education provided:   PT educated pt on importance of compliance with their HEP this visit.     Written Home Exercises Provided: Patient instructed to cont prior HEP. Exercises were reviewed and Marti was able to demonstrate them prior to the end of the session.  Marti demonstrated good  understanding of the education provided. See EMR under Patient Instructions for exercises provided during therapy sessions    ASSESSMENT   Pt tolerated tx session well today. Progressed exercises from AROM to resistance bands today. Pt demonstrates improvements in  strength and elbow extension AROM compared to her previous session. Continue PT POC.     Marit Is progressing well towards her goals.   Pt prognosis is Good.     Pt will continue to benefit from skilled outpatient physical therapy to address the deficits listed in the problem list box on initial evaluation, provide pt/family education and to maximize pt's level of independence in the home and community environment.     Pt's spiritual, cultural and " educational needs considered and pt agreeable to plan of care and goals.     Anticipated barriers to physical therapy: None      Goals:  Short Term Goals (6 Weeks):   1. Patient will increase L Elbow flexion AROM to 140 degrees to improve functional reach  2. Patient will increase L Elbow extension to 0 degrees to improve overhead reach  3. Patient to report < or =2/10 pain in L Elbow with ADL's  4. Patient to be able to drive without limitation or difficulty   Long Term Goals (12 Weeks):   1. Patient to have decreased subjective report of disability as noted by a score of <40% on the FOTO elbow questionnaire   2. Patient to be independent with home exercise program for improved self management of condition  3. Patient will increase strength in L upper extremity to 4+/5 or greater for return to recreation  4. Patient to be able to carry 7# 80 ft with little to no difficulty for ease with household chores and shopping     PLAN   Plan of care Certification: 12/5/2022 to 3/5/2023.     Outpatient Physical Therapy 2 times weekly for 12 weeks to include the following interventions: Gait Training, Manual Therapy, Moist Heat/ Ice, Neuromuscular Re-ed, Paraffin, Patient Education, Therapeutic Activities, and Therapeutic Exercise.          Zofia Jarrett, PT

## 2022-12-15 ENCOUNTER — IMMUNIZATION (OUTPATIENT)
Dept: INTERNAL MEDICINE | Facility: CLINIC | Age: 67
End: 2022-12-15
Payer: MEDICARE

## 2022-12-15 DIAGNOSIS — Z23 NEED FOR VACCINATION: Primary | ICD-10-CM

## 2022-12-15 PROCEDURE — 0124A COVID-19, MRNA, LNP-S, BIVALENT BOOSTER, PF, 30 MCG/0.3 ML DOSE: CPT | Mod: CV19,PBBFAC | Performed by: INTERNAL MEDICINE

## 2022-12-15 PROCEDURE — 91312 COVID-19, MRNA, LNP-S, BIVALENT BOOSTER, PF, 30 MCG/0.3 ML DOSE: CPT | Mod: S$GLB,,, | Performed by: INTERNAL MEDICINE

## 2022-12-15 PROCEDURE — 91312 COVID-19, MRNA, LNP-S, BIVALENT BOOSTER, PF, 30 MCG/0.3 ML DOSE: ICD-10-PCS | Mod: S$GLB,,, | Performed by: INTERNAL MEDICINE

## 2022-12-16 LAB — FUNGUS SPEC CULT: NORMAL

## 2022-12-20 ENCOUNTER — CLINICAL SUPPORT (OUTPATIENT)
Dept: REHABILITATION | Facility: OTHER | Age: 67
End: 2022-12-20
Payer: MEDICARE

## 2022-12-20 DIAGNOSIS — R29.898 WEAKNESS OF LEFT UPPER EXTREMITY: ICD-10-CM

## 2022-12-20 DIAGNOSIS — M25.522 LEFT ELBOW PAIN: ICD-10-CM

## 2022-12-20 DIAGNOSIS — M25.622 DECREASED RANGE OF MOTION OF LEFT ELBOW: Primary | ICD-10-CM

## 2022-12-20 PROCEDURE — 97110 THERAPEUTIC EXERCISES: CPT | Mod: PN,CQ

## 2022-12-22 ENCOUNTER — CLINICAL SUPPORT (OUTPATIENT)
Dept: REHABILITATION | Facility: OTHER | Age: 67
End: 2022-12-22
Payer: MEDICARE

## 2022-12-22 DIAGNOSIS — M25.622 DECREASED RANGE OF MOTION OF LEFT ELBOW: Primary | ICD-10-CM

## 2022-12-22 DIAGNOSIS — R29.898 WEAKNESS OF LEFT UPPER EXTREMITY: ICD-10-CM

## 2022-12-22 DIAGNOSIS — M25.522 LEFT ELBOW PAIN: ICD-10-CM

## 2022-12-22 PROCEDURE — 97110 THERAPEUTIC EXERCISES: CPT | Mod: PN,CQ

## 2022-12-22 NOTE — PROGRESS NOTES
"[facility]   Physical Therapy Treatment Note     Name: Marti Coley  Clinic Number: [id number]    Therapy Diagnosis:   Encounter Diagnoses   Name Primary?    Decreased range of motion of left elbow Yes    Weakness of left upper extremity     Left elbow pain      Physician: Marilu Edwards NP    Visit Date: 12/22/2022    Physician Orders: PT Evaluate and Treat   Medical Diagnosis: M70.32 (ICD-10-CM) - Bursitis of left elbow, unspecified bursa  Evaluation Date: 12/5/22  Authorization Period Expiration: 12/31/22  Plan of Care Certification Period: 12/5/22- 3/5/23  Progress Note Due: 1/5/23    Visit # / Visits authorized: 16/20  FOTO: 1/ 3       Precautions: NWB L UE, only able to lift 2#'s with L UE at this time    Time In: 1100  Time Out: 1200  Total Billable Time: 30 minutes      SUBJECTIVE     Pt reports: she has noticed a little swelling/tenderness in her elbow but overall she is not in too much pain. States she has been feeling some tightness in her L UT.  She was compliant with home exercise program.  Response to previous treatment: felt fine  Functional change: improved extension AROM    Pain: 2/10  Location: left elbow      OBJECTIVE     Objective Measures updated at progress report unless specified.       TREATMENT       Charges based on 1:1 tx:  Marti received the treatments listed below:      Patient received therapeutic exercises for 25 minutes for improved strength and AROM including:  Scapula retractions 30x with 1/2 foam roller  L AROM wrist flexion 2 x 10 vs 3# DB  L AROM wrist extension 2 x 10 vs 3# DB  L Pronation/ supination AROM, vs 2# x 30  L Radial and ulnar deviation vs 2# 3x10 repetitions  Elbow flexion and extension in supine with towel under humerus 5" holds  Supine elbow extension prop stretch with 1# weight x45 seconds   vs red web 30x 5" hold  Finger extension vs red web 30x 5" hold  L shoulder flexion AROM with towel on wall 30x   L shoulder ER band " "walk outs vs red band 20x  L shoulder IR band walk outs vs red band 20x  Rows vs red TB 3x10 repetitions  Bicep curls vs 3# 3X10 repetitions  Towel wringing vs yellow therabar 30x 5" hold  U and Rainbows vs yellow  therabar 30x5" hold  +Velcro board, large paddle twist, x3 min      Patient received manual therapeutic technique for 5 minutes for improved soft tissue and joint mobility including:  PROM to L elbow in all planes   MFR to L UT    Pt received therapeutic activities for 00 minutes for improved tolerance to functional activities including:  UBE 3min/3min  Ball circles on wall for L elbow endurance 30x CW/ 30x CCW          PATIENT EDUCATION AND HOME EXERCISES     Home Exercises Provided and Patient Education Provided     Education provided:   PT educated pt on importance of compliance with their HEP this visit.     Written Home Exercises Provided: Patient instructed to cont prior HEP. Exercises were reviewed and Marti was able to demonstrate them prior to the end of the session.  Marti demonstrated good  understanding of the education provided. See EMR under Patient Instructions for exercises provided during therapy sessions    ASSESSMENT   Pt tolerated tx session well today. Added increased resistance pronation/supination with good tolerance noted. Moderate tightness in L UT was present however appeared improved post MFR.     Marti Is progressing well towards her goals.   Pt prognosis is Good.     Pt will continue to benefit from skilled outpatient physical therapy to address the deficits listed in the problem list box on initial evaluation, provide pt/family education and to maximize pt's level of independence in the home and community environment.     Pt's spiritual, cultural and educational needs considered and pt agreeable to plan of care and goals.     Anticipated barriers to physical therapy: None      Goals:  Short Term Goals (6 Weeks):   1. Patient will increase L Elbow flexion AROM to " 140 degrees to improve functional reach  2. Patient will increase L Elbow extension to 0 degrees to improve overhead reach  3. Patient to report < or =2/10 pain in L Elbow with ADL's  4. Patient to be able to drive without limitation or difficulty   Long Term Goals (12 Weeks):   1. Patient to have decreased subjective report of disability as noted by a score of <40% on the FOTO elbow questionnaire   2. Patient to be independent with home exercise program for improved self management of condition  3. Patient will increase strength in L upper extremity to 4+/5 or greater for return to recreation  4. Patient to be able to carry 7# 80 ft with little to no difficulty for ease with household chores and shopping     PLAN   Plan of care Certification: 12/5/2022 to 3/5/2023.     Outpatient Physical Therapy 2 times weekly for 12 weeks to include the following interventions: Gait Training, Manual Therapy, Moist Heat/ Ice, Neuromuscular Re-ed, Paraffin, Patient Education, Therapeutic Activities, and Therapeutic Exercise.          Gavin Barbosa, PTA

## 2023-01-03 ENCOUNTER — CLINICAL SUPPORT (OUTPATIENT)
Dept: REHABILITATION | Facility: OTHER | Age: 68
End: 2023-01-03
Payer: MEDICARE

## 2023-01-03 DIAGNOSIS — R29.898 WEAKNESS OF LEFT UPPER EXTREMITY: ICD-10-CM

## 2023-01-03 DIAGNOSIS — M25.622 DECREASED RANGE OF MOTION OF LEFT ELBOW: Primary | ICD-10-CM

## 2023-01-03 DIAGNOSIS — M25.522 LEFT ELBOW PAIN: ICD-10-CM

## 2023-01-03 PROCEDURE — 97530 THERAPEUTIC ACTIVITIES: CPT | Mod: PN

## 2023-01-03 PROCEDURE — 97140 MANUAL THERAPY 1/> REGIONS: CPT | Mod: PN

## 2023-01-03 PROCEDURE — 97110 THERAPEUTIC EXERCISES: CPT | Mod: PN

## 2023-01-03 NOTE — PROGRESS NOTES
"[facility]   Physical Therapy Treatment Note     Name: Marti Coley  Clinic Number: [id number]    Therapy Diagnosis:   Encounter Diagnoses   Name Primary?    Decreased range of motion of left elbow Yes    Weakness of left upper extremity     Left elbow pain      Physician: Marilu Edwards NP    Visit Date: 1/3/2023    Physician Orders: PT Evaluate and Treat   Medical Diagnosis: M70.32 (ICD-10-CM) - Bursitis of left elbow, unspecified bursa  Evaluation Date: 12/5/22  Authorization Period Expiration: 12/31/22  Plan of Care Certification Period: 12/5/22- 3/5/23  Progress Note Due: 1/5/23    Visit # / Visits authorized: 1/20  FOTO: 1/ 3       Precautions: NWB L UE, only able to lift 2#'s with L UE at this time    Time In: 0200pm  Time Out: 0300pm  Total Billable Time: 60 minutes      SUBJECTIVE     Pt reports: that she is doing okay today. Pt states that she is concerned about a bump that she feels in her elbow. Pt describes no signs/symptoms of infection. She has seen improvements in motion and decreased pain, and she is stronger. Pt sees her physician Friday.  She was compliant with home exercise program.  Response to previous treatment: decreased L elbow pain and improved ability to reach overhead  Functional change: improved strength L elbow    Pain: 2/10  Location: left elbow      OBJECTIVE     Objective Measures updated at progress report unless specified.       TREATMENT       Charges based on 1:1 tx:  Marti received the treatments listed below:      Patient received therapeutic exercises for 44 minutes for improved strength and AROM including:  L AROM wrist flexion 2 x 10 vs 3# DB  L AROM wrist extension 2 x 10 vs 3# DB  L Pronation/ supination AROM, vs 2# x 30  L Radial and ulnar deviation vs 2# 3x10 repetitions  Elbow flexion and extension in supine with towel under humerus 5" holds   vs red web 30x 5" hold  Finger extension vs red web 30x 5" hold  L shoulder flexion " "AROM with towel on wall 30x   L shoulder ER band walk outs vs red band 20x  L shoulder IR band walk outs vs red band 20x  Rows vs red TB 3x10 repetitions  Bicep curls vs 3# 3x10 repetitions  Towel wringing vs yellow therabar 30x 5" hold  U and Rainbows vs yellow  therabar 30x5" hold  Velcro board, large paddle twist, x3 min  +Seated elbow extension prop vs 3# weight x2 minutes  +Tricep kickback vs 3# 3x10 repetitions  +Shoulder extension vs RTB 3x10 repetitions  +Elbow extension vs RTB 3x10 repetitions      Patient received manual therapeutic technique for 8 minutes for improved soft tissue and joint mobility including:  PROM to L elbow in all planes   MFR to L UT    Pt received therapeutic activities for 8 minutes for improved tolerance to functional activities including:  UBE 3min/3min  Ball circles on wall for L elbow endurance 30x CW/ 30x CCW          PATIENT EDUCATION AND HOME EXERCISES     Home Exercises Provided and Patient Education Provided     Education provided:   PT educated pt on importance of compliance with their HEP this visit.     Written Home Exercises Provided: Patient instructed to cont prior HEP. Exercises were reviewed and Marti was able to demonstrate them prior to the end of the session.  Marti demonstrated good  understanding of the education provided. See EMR under Patient Instructions for exercises provided during therapy sessions    ASSESSMENT   Pt tolerated tx session well today with no increase in left elbow pain. Progressed resistance with exercises and included additional endurance training today.     Marti Is progressing well towards her goals.   Pt prognosis is Good.     Pt will continue to benefit from skilled outpatient physical therapy to address the deficits listed in the problem list box on initial evaluation, provide pt/family education and to maximize pt's level of independence in the home and community environment.     Pt's spiritual, cultural and educational " needs considered and pt agreeable to plan of care and goals.     Anticipated barriers to physical therapy: None      Goals:  Short Term Goals (6 Weeks):   1. Patient will increase L Elbow flexion AROM to 140 degrees to improve functional reach  2. Patient will increase L Elbow extension to 0 degrees to improve overhead reach  3. Patient to report < or =2/10 pain in L Elbow with ADL's  4. Patient to be able to drive without limitation or difficulty   Long Term Goals (12 Weeks):   1. Patient to have decreased subjective report of disability as noted by a score of <40% on the FOTO elbow questionnaire   2. Patient to be independent with home exercise program for improved self management of condition  3. Patient will increase strength in L upper extremity to 4+/5 or greater for return to recreation  4. Patient to be able to carry 7# 80 ft with little to no difficulty for ease with household chores and shopping     PLAN   Plan of care Certification: 12/5/2022 to 3/5/2023.     Outpatient Physical Therapy 2 times weekly for 12 weeks to include the following interventions: Gait Training, Manual Therapy, Moist Heat/ Ice, Neuromuscular Re-ed, Paraffin, Patient Education, Therapeutic Activities, and Therapeutic Exercise.          Zofia Jarrett, PT

## 2023-01-05 ENCOUNTER — CLINICAL SUPPORT (OUTPATIENT)
Dept: REHABILITATION | Facility: OTHER | Age: 68
End: 2023-01-05
Payer: MEDICARE

## 2023-01-05 DIAGNOSIS — R29.898 WEAKNESS OF LEFT UPPER EXTREMITY: ICD-10-CM

## 2023-01-05 DIAGNOSIS — M25.622 DECREASED RANGE OF MOTION OF LEFT ELBOW: Primary | ICD-10-CM

## 2023-01-05 DIAGNOSIS — M25.522 LEFT ELBOW PAIN: ICD-10-CM

## 2023-01-05 PROCEDURE — 97110 THERAPEUTIC EXERCISES: CPT | Mod: PN,CQ

## 2023-01-05 NOTE — PROGRESS NOTES
"[facility]   Physical Therapy Treatment Note     Name: Marti Coley  Clinic Number: [id number]    Therapy Diagnosis:   Encounter Diagnoses   Name Primary?    Decreased range of motion of left elbow Yes    Weakness of left upper extremity     Left elbow pain        Physician: Marilu Edwards NP    Visit Date: 1/5/2023    Physician Orders: PT Evaluate and Treat   Medical Diagnosis: M70.32 (ICD-10-CM) - Bursitis of left elbow, unspecified bursa  Evaluation Date: 12/5/22  Authorization Period Expiration: 12/31/22  Plan of Care Certification Period: 12/5/22- 3/5/23  Progress Note Due: 1/5/23    Visit # / Visits authorized: 3/20  FOTO: 1/ 3       Precautions: NWB L UE, only able to lift 2#'s with L UE at this time    Time In: 1400  Time Out: 1500  Total Billable Time: 23 minutes      SUBJECTIVE     Pt reports: that she is feeling fine. States she has a hard time straightening out her elbow still.  Pt sees her physician Friday.    She was compliant with home exercise program.  Response to previous treatment: decreased L elbow pain and improved ability to reach overhead  Functional change: improved strength L elbow    Pain: 2/10  Location: left elbow      OBJECTIVE     Objective Measures updated at progress report unless specified.       TREATMENT       Charges based on 1:1 tx:  Marti received the treatments listed below:      Patient received therapeutic exercises for 18 minutes for improved strength and AROM including:  L AROM wrist flexion 2 x 10 vs 3# DB  L AROM wrist extension 2 x 10 vs 3# DB  L Pronation/ supination AROM, vs 2# x 30  L Radial and ulnar deviation vs 2# 3x10 repetitions  Elbow flexion and extension in supine with towel under humerus 5" holds   vs red web 30x 5" hold  Finger extension vs red web 30x 5" hold  L shoulder flexion AROM with towel on wall 30x   L shoulder ER/IR, red band, 30x ea  L shoulder ER band walk outs vs red band 20x  L shoulder IR band walk " "outs vs red band 20x  Rows vs red TB 3x10 repetitions  Bicep curls vs 3# 3x10 repetitions  Towel wringing vs yellow therabar 30x 5" hold  U and Rainbows vs yellow  therabar 30x5" hold  Velcro board, large paddle twist, x3 min  Seated elbow extension prop vs 3# weight x2 minutes  Tricep kickback vs 3# 3x10 repetitions  Shoulder extension vs RTB 3x10 repetitions  Elbow extension vs RTB 3x10 repetitions      Patient received manual therapeutic technique for 0 minutes for improved soft tissue and joint mobility including:  PROM to L elbow in all planes   MFR to L UT    Pt received therapeutic activities for 5 minutes for improved tolerance to functional activities including:  UBE 3min/3min  Ball circles on wall for L elbow endurance 30x CW/ 30x CCW          PATIENT EDUCATION AND HOME EXERCISES     Home Exercises Provided and Patient Education Provided     Education provided:   PT educated pt on importance of compliance with their HEP this visit.     Written Home Exercises Provided: Patient instructed to cont prior HEP. Exercises were reviewed and Marti was able to demonstrate them prior to the end of the session.  Marti demonstrated good  understanding of the education provided. See EMR under Patient Instructions for exercises provided during therapy sessions    ASSESSMENT   Pt tolerated tx session well today with no increase in left elbow pain. Progressed resistance with exercises and included additional endurance training today.     Marti Is progressing well towards her goals.   Pt prognosis is Good.     Pt will continue to benefit from skilled outpatient physical therapy to address the deficits listed in the problem list box on initial evaluation, provide pt/family education and to maximize pt's level of independence in the home and community environment.     Pt's spiritual, cultural and educational needs considered and pt agreeable to plan of care and goals.     Anticipated barriers to physical therapy: " None      Goals:  Short Term Goals (6 Weeks):   1. Patient will increase L Elbow flexion AROM to 140 degrees to improve functional reach  2. Patient will increase L Elbow extension to 0 degrees to improve overhead reach  3. Patient to report < or =2/10 pain in L Elbow with ADL's  4. Patient to be able to drive without limitation or difficulty   Long Term Goals (12 Weeks):   1. Patient to have decreased subjective report of disability as noted by a score of <40% on the FOTO elbow questionnaire   2. Patient to be independent with home exercise program for improved self management of condition  3. Patient will increase strength in L upper extremity to 4+/5 or greater for return to recreation  4. Patient to be able to carry 7# 80 ft with little to no difficulty for ease with household chores and shopping     PLAN   Plan of care Certification: 12/5/2022 to 3/5/2023.     Outpatient Physical Therapy 2 times weekly for 12 weeks to include the following interventions: Gait Training, Manual Therapy, Moist Heat/ Ice, Neuromuscular Re-ed, Paraffin, Patient Education, Therapeutic Activities, and Therapeutic Exercise.          Gavin Barbosa, PTA

## 2023-01-05 NOTE — PROGRESS NOTES
"Ms. Coley is here today for a post-operative visit.  She is 51 days status post left olecranon bursectomy with skin excision for wound closure by Dr. Freitas on 11/16/2022. She reports that she is doing well, she does feel a mobile lump at the scar.  She is attending OT. No current pain.  She denies fever, chills, and sweats since the time of the surgery.     PATHOLOGY:  "left olecranon bursa", excision:       - Consistent with chronic bursitis    MICROBIOLOGY:  Aerobic Bacterial Culture No growth      Gram Stain Result Rare WBC's    Gram Stain Result No organisms seen      Anaerobic Culture No anaerobes isolated        Physical exam:    Vitals:    01/06/23 1032   BP: 128/78   Pulse: 81   Weight: 77.6 kg (171 lb)   Height: 5' 3" (1.6 m)   PainSc: 0-No pain       Vital signs are stable, patient is afebrile.  Patient is well dressed and well groomed, no acute distress.  Alert and oriented to person, place, and time.  Incision is healing well - clean, dry, and intact. Mild thickening at the scar, mild residual edema.  Small 1 cm mobile lump at the surgical site, possible cyst vs fat necrosis.  There is no erythema or exudate.  There is no sign of any infection. She is NVI.  Good finger and wrist range of motion.    Assessment: 51 days status post left olecranon bursectomy with skin excision for wound closure    Plan:  Marti was seen today for post-op evaluation.    Diagnoses and all orders for this visit:    Olecranon bursitis of left elbow    Orthopedic aftercare            - OT as scheduled, gradual strengthening  - Discussed edema timeline and patient's lump  - Discussed scar massage  - Follow up in 4 weeks   - Call with questions or concerns    Per OP note: POSTOPERATIVE PLAN:  Follow-up 2 weeks for wound check  Nonweightbearing left upper extremity  Begin physical therapy at 2 weeks, order placed.   Follow-up pathology results      "

## 2023-01-06 ENCOUNTER — OFFICE VISIT (OUTPATIENT)
Dept: ORTHOPEDICS | Facility: CLINIC | Age: 68
End: 2023-01-06
Payer: MEDICARE

## 2023-01-06 VITALS
HEART RATE: 81 BPM | DIASTOLIC BLOOD PRESSURE: 78 MMHG | SYSTOLIC BLOOD PRESSURE: 128 MMHG | HEIGHT: 63 IN | BODY MASS INDEX: 30.3 KG/M2 | WEIGHT: 171 LBS

## 2023-01-06 DIAGNOSIS — M70.22 OLECRANON BURSITIS OF LEFT ELBOW: Primary | ICD-10-CM

## 2023-01-06 DIAGNOSIS — Z47.89 ORTHOPEDIC AFTERCARE: ICD-10-CM

## 2023-01-06 PROCEDURE — 99999 PR PBB SHADOW E&M-EST. PATIENT-LVL III: CPT | Mod: PBBFAC,,, | Performed by: PHYSICIAN ASSISTANT

## 2023-01-06 PROCEDURE — 3074F SYST BP LT 130 MM HG: CPT | Mod: CPTII,S$GLB,, | Performed by: PHYSICIAN ASSISTANT

## 2023-01-06 PROCEDURE — 1126F AMNT PAIN NOTED NONE PRSNT: CPT | Mod: CPTII,S$GLB,, | Performed by: PHYSICIAN ASSISTANT

## 2023-01-06 PROCEDURE — 3078F DIAST BP <80 MM HG: CPT | Mod: CPTII,S$GLB,, | Performed by: PHYSICIAN ASSISTANT

## 2023-01-06 PROCEDURE — 3074F PR MOST RECENT SYSTOLIC BLOOD PRESSURE < 130 MM HG: ICD-10-PCS | Mod: CPTII,S$GLB,, | Performed by: PHYSICIAN ASSISTANT

## 2023-01-06 PROCEDURE — 99024 POSTOP FOLLOW-UP VISIT: CPT | Mod: S$GLB,,, | Performed by: PHYSICIAN ASSISTANT

## 2023-01-06 PROCEDURE — 3008F PR BODY MASS INDEX (BMI) DOCUMENTED: ICD-10-PCS | Mod: CPTII,S$GLB,, | Performed by: PHYSICIAN ASSISTANT

## 2023-01-06 PROCEDURE — 1157F PR ADVANCE CARE PLAN OR EQUIV PRESENT IN MEDICAL RECORD: ICD-10-PCS | Mod: CPTII,S$GLB,, | Performed by: PHYSICIAN ASSISTANT

## 2023-01-06 PROCEDURE — 1160F RVW MEDS BY RX/DR IN RCRD: CPT | Mod: CPTII,S$GLB,, | Performed by: PHYSICIAN ASSISTANT

## 2023-01-06 PROCEDURE — 1159F MED LIST DOCD IN RCRD: CPT | Mod: CPTII,S$GLB,, | Performed by: PHYSICIAN ASSISTANT

## 2023-01-06 PROCEDURE — 3008F BODY MASS INDEX DOCD: CPT | Mod: CPTII,S$GLB,, | Performed by: PHYSICIAN ASSISTANT

## 2023-01-06 PROCEDURE — 1157F ADVNC CARE PLAN IN RCRD: CPT | Mod: CPTII,S$GLB,, | Performed by: PHYSICIAN ASSISTANT

## 2023-01-06 PROCEDURE — 1126F PR PAIN SEVERITY QUANTIFIED, NO PAIN PRESENT: ICD-10-PCS | Mod: CPTII,S$GLB,, | Performed by: PHYSICIAN ASSISTANT

## 2023-01-06 PROCEDURE — 99999 PR PBB SHADOW E&M-EST. PATIENT-LVL III: ICD-10-PCS | Mod: PBBFAC,,, | Performed by: PHYSICIAN ASSISTANT

## 2023-01-06 PROCEDURE — 3078F PR MOST RECENT DIASTOLIC BLOOD PRESSURE < 80 MM HG: ICD-10-PCS | Mod: CPTII,S$GLB,, | Performed by: PHYSICIAN ASSISTANT

## 2023-01-06 PROCEDURE — 1160F PR REVIEW ALL MEDS BY PRESCRIBER/CLIN PHARMACIST DOCUMENTED: ICD-10-PCS | Mod: CPTII,S$GLB,, | Performed by: PHYSICIAN ASSISTANT

## 2023-01-06 PROCEDURE — 99024 PR POST-OP FOLLOW-UP VISIT: ICD-10-PCS | Mod: S$GLB,,, | Performed by: PHYSICIAN ASSISTANT

## 2023-01-06 PROCEDURE — 1159F PR MEDICATION LIST DOCUMENTED IN MEDICAL RECORD: ICD-10-PCS | Mod: CPTII,S$GLB,, | Performed by: PHYSICIAN ASSISTANT

## 2023-01-09 ENCOUNTER — CLINICAL SUPPORT (OUTPATIENT)
Dept: REHABILITATION | Facility: OTHER | Age: 68
End: 2023-01-09
Payer: MEDICARE

## 2023-01-09 DIAGNOSIS — R29.898 WEAKNESS OF LEFT UPPER EXTREMITY: ICD-10-CM

## 2023-01-09 DIAGNOSIS — M25.522 LEFT ELBOW PAIN: ICD-10-CM

## 2023-01-09 DIAGNOSIS — M25.622 DECREASED RANGE OF MOTION OF LEFT ELBOW: Primary | ICD-10-CM

## 2023-01-09 PROCEDURE — 97530 THERAPEUTIC ACTIVITIES: CPT | Mod: PN

## 2023-01-09 PROCEDURE — 97110 THERAPEUTIC EXERCISES: CPT | Mod: PN

## 2023-01-09 NOTE — PROGRESS NOTES
"[facility]   Physical Therapy Treatment Note     Name: Marti Coley  Clinic Number: [id number]    Therapy Diagnosis:   Encounter Diagnoses   Name Primary?    Decreased range of motion of left elbow Yes    Weakness of left upper extremity     Left elbow pain        Physician: Marilu Edwards NP    Visit Date: 1/9/2023    Physician Orders: PT Evaluate and Treat   Medical Diagnosis: M70.32 (ICD-10-CM) - Bursitis of left elbow, unspecified bursa  Evaluation Date: 12/5/22  Authorization Period Expiration: 12/31/22  Plan of Care Certification Period: 12/5/22- 3/5/23  Progress Note Due: 1/5/23    Visit # / Visits authorized: 3/20  FOTO: 2/ 3       Precautions: Progress as tolerated by pt.    Time In: 1000am  Time Out: 1100am  Total Billable Time: 60 minutes      SUBJECTIVE     Pt reports: that she is feeling well today. Pt states that she continues to have a palpable bump in her elbow that her physician would like to follow up with her about in the coming weeks. Pt's function and strength have improved, she states that she now carries groceries and drives with no L UE dysfunction.     She was compliant with home exercise program.  Response to previous treatment: decreased L elbow pain and improved ability to reach overhead  Functional change: improved strength L elbow    Pain: 2/10  Location: left elbow      OBJECTIVE     Objective Measures updated at progress report unless specified.       TREATMENT       Charges based on 1:1 tx:  Marti received the treatments listed below:      Patient received therapeutic exercises for 52 minutes for improved strength and AROM including:  Elbow flexion and extension in supine with towel under humerus 5" holds   vs red web 30x 5" hold  Finger extension vs red web 30x 5" hold  L shoulder flexion AROM with towel on wall 30x   L shoulder ER/IR, red band, 30x ea  L shoulder ER band walk outs vs red band 20x  L shoulder IR band walk outs vs red band " "20x  Rows vs red TB 3x10 repetitions  Bicep curls vs 3# 3x10 repetitions  Towel wringing vs red therabar 30x 5" hold  U and Rainbows vs red therabar 30x5" hold  Velcro board, large paddle twist, x3 min  Tricep kickback vs 3# 3x10 repetitions  Shoulder extension vs RTB 3x10 repetitions  Elbow extension vs RTB 3x10 repetitions  +Wall pushups 20x      Patient received manual therapeutic technique for 0 minutes for improved soft tissue and joint mobility including:  PROM to L elbow in all planes   MFR to L UT    Pt received therapeutic activities for 8 minutes for improved tolerance to functional activities including:  UBE 3min/3min  Ball circles on wall for L elbow endurance 30x CW/ 30x CCW          PATIENT EDUCATION AND HOME EXERCISES     Home Exercises Provided and Patient Education Provided     Education provided:   PT educated pt on importance of compliance with their HEP this visit.     Written Home Exercises Provided: Patient instructed to cont prior HEP. Exercises were reviewed and Marti was able to demonstrate them prior to the end of the session.  Marti demonstrated good  understanding of the education provided. See EMR under Patient Instructions for exercises provided during therapy sessions    ASSESSMENT   Pt tolerated tx session well today and completed all exercises with no increase in elbow pain. Introduced weight bearing exercises today with no increase in pain. Continue PT POC with emphasis on improving pt's L elbow strength and AROM so that she may return to her PLOF.    Marti Is progressing well towards her goals.   Pt prognosis is Good.     Pt will continue to benefit from skilled outpatient physical therapy to address the deficits listed in the problem list box on initial evaluation, provide pt/family education and to maximize pt's level of independence in the home and community environment.     Pt's spiritual, cultural and educational needs considered and pt agreeable to plan of care " and goals.     Anticipated barriers to physical therapy: None      Goals:  Short Term Goals (6 Weeks):   1. Patient will increase L Elbow flexion AROM to 140 degrees to improve functional reach  2. Patient will increase L Elbow extension to 0 degrees to improve overhead reach  3. Patient to report < or =2/10 pain in L Elbow with ADL's  4. Patient to be able to drive without limitation or difficulty   Long Term Goals (12 Weeks):   1. Patient to have decreased subjective report of disability as noted by a score of <40% on the FOTO elbow questionnaire   2. Patient to be independent with home exercise program for improved self management of condition  3. Patient will increase strength in L upper extremity to 4+/5 or greater for return to recreation  4. Patient to be able to carry 7# 80 ft with little to no difficulty for ease with household chores and shopping     PLAN   Plan of care Certification: 12/5/2022 to 3/5/2023.     Outpatient Physical Therapy 2 times weekly for 12 weeks to include the following interventions: Gait Training, Manual Therapy, Moist Heat/ Ice, Neuromuscular Re-ed, Paraffin, Patient Education, Therapeutic Activities, and Therapeutic Exercise.          Zofia Jarrett, PT

## 2023-01-11 ENCOUNTER — CLINICAL SUPPORT (OUTPATIENT)
Dept: REHABILITATION | Facility: OTHER | Age: 68
End: 2023-01-11
Payer: MEDICARE

## 2023-01-11 DIAGNOSIS — M25.622 DECREASED RANGE OF MOTION OF LEFT ELBOW: Primary | ICD-10-CM

## 2023-01-11 DIAGNOSIS — R29.898 WEAKNESS OF LEFT UPPER EXTREMITY: ICD-10-CM

## 2023-01-11 DIAGNOSIS — M25.522 LEFT ELBOW PAIN: ICD-10-CM

## 2023-01-11 PROCEDURE — 97530 THERAPEUTIC ACTIVITIES: CPT | Mod: PN

## 2023-01-11 PROCEDURE — 97110 THERAPEUTIC EXERCISES: CPT | Mod: PN

## 2023-01-11 NOTE — PROGRESS NOTES
"[facility]   Physical Therapy Treatment Note     Name: Marit Coley  Clinic Number: [id number]    Therapy Diagnosis:   Encounter Diagnoses   Name Primary?    Decreased range of motion of left elbow Yes    Weakness of left upper extremity     Left elbow pain          Physician: Marilu Edwards NP    Visit Date: 1/11/2023    Physician Orders: PT Evaluate and Treat   Medical Diagnosis: M70.32 (ICD-10-CM) - Bursitis of left elbow, unspecified bursa  Evaluation Date: 12/5/22  Authorization Period Expiration: 12/31/22  Plan of Care Certification Period: 12/5/22- 3/5/23  Progress Note Due: 1/5/23    Visit # / Visits authorized: 4/20  FOTO: 2/ 3       Precautions: Progress as tolerated by pt.    Time In: 0900am  Time Out: 1000am  Total Billable Time: 60 minutes      SUBJECTIVE     Pt reports: that she is doing well after her last session earlier this week. Pt states that yesterday she carried in groceries with her left arm and did not notice any pain or weakness during or after carrying these groceries.      She was compliant with home exercise program.  Response to previous treatment: decreased L elbow pain and improved ability to reach overhead  Functional change: improved strength L elbow for driving and carrying groceries    Pain: 0/10  Location: left elbow      OBJECTIVE     Objective Measures updated at progress report unless specified.       TREATMENT       Charges based on 1:1 tx:  Marti received the treatments listed below:      Patient received therapeutic exercises for 52 minutes for improved strength and AROM including:  L shoulder ER/IR, red band, 30x ea  Rows vs red TB 3x10 repetitions  Bicep curls vs 3# 3x10 repetitions  Towel wringing vs red therabar 30x 5" hold  U and Rainbows vs red therabar 30x5" hold  Velcro board, large paddle twist, x3 min  Tricep kickback vs 3# 3x10 repetitions  Shoulder extension vs RTB 3x10 repetitions  Tricep Elbow extension vs RTB 3x12 " repetitions  Wall pushups 20x      Patient received manual therapeutic technique for 0 minutes for improved soft tissue and joint mobility including:  PROM to L elbow in all planes   MFR to L UT    Pt received therapeutic activities for 8 minutes for improved tolerance to functional activities including:  UBE 3min/3min  Ball circles on wall for L elbow endurance 30x CW/ 30x CCW with red weighted ball today          PATIENT EDUCATION AND HOME EXERCISES     Home Exercises Provided and Patient Education Provided     Education provided:   PT educated pt on importance of compliance with their HEP this visit.     Written Home Exercises Provided: Patient instructed to cont prior HEP. Exercises were reviewed and Marti was able to demonstrate them prior to the end of the session.  Marti demonstrated good  understanding of the education provided. See EMR under Patient Instructions for exercises provided during therapy sessions    ASSESSMENT   Pt tolerated tx session well today and completed all exercises with no increase in elbow pain. Introduced weight bearing exercises today with no increase in pain. Continue PT POC with emphasis on improving pt's L elbow strength and AROM so that she may return to her PLOF.    Marti Is progressing well towards her goals.   Pt prognosis is Good.     Pt will continue to benefit from skilled outpatient physical therapy to address the deficits listed in the problem list box on initial evaluation, provide pt/family education and to maximize pt's level of independence in the home and community environment.     Pt's spiritual, cultural and educational needs considered and pt agreeable to plan of care and goals.     Anticipated barriers to physical therapy: None      Goals:  Short Term Goals (6 Weeks):   1. Patient will increase L Elbow flexion AROM to 140 degrees to improve functional reach  2. Patient will increase L Elbow extension to 0 degrees to improve overhead reach  3. Patient  to report < or =2/10 pain in L Elbow with ADL's  4. Patient to be able to drive without limitation or difficulty   Long Term Goals (12 Weeks):   1. Patient to have decreased subjective report of disability as noted by a score of <40% on the FOTO elbow questionnaire   2. Patient to be independent with home exercise program for improved self management of condition  3. Patient will increase strength in L upper extremity to 4+/5 or greater for return to recreation  4. Patient to be able to carry 7# 80 ft with little to no difficulty for ease with household chores and shopping     PLAN   Plan of care Certification: 12/5/2022 to 3/5/2023.     Outpatient Physical Therapy 2 times weekly for 12 weeks to include the following interventions: Gait Training, Manual Therapy, Moist Heat/ Ice, Neuromuscular Re-ed, Paraffin, Patient Education, Therapeutic Activities, and Therapeutic Exercise.          Zofia Jarrett, PT

## 2023-01-18 ENCOUNTER — CLINICAL SUPPORT (OUTPATIENT)
Dept: REHABILITATION | Facility: OTHER | Age: 68
End: 2023-01-18
Payer: MEDICARE

## 2023-01-18 DIAGNOSIS — M25.622 DECREASED RANGE OF MOTION OF LEFT ELBOW: Primary | ICD-10-CM

## 2023-01-18 DIAGNOSIS — R29.898 WEAKNESS OF LEFT UPPER EXTREMITY: ICD-10-CM

## 2023-01-18 DIAGNOSIS — M25.522 LEFT ELBOW PAIN: ICD-10-CM

## 2023-01-18 PROCEDURE — 97530 THERAPEUTIC ACTIVITIES: CPT | Mod: PN

## 2023-01-18 PROCEDURE — 97110 THERAPEUTIC EXERCISES: CPT | Mod: PN

## 2023-01-18 NOTE — PLAN OF CARE
[facility]   Physical Therapy Updated Plan of Care    Name: Marti Coley  Clinic Number: [id number]    Therapy Diagnosis:   Encounter Diagnoses   Name Primary?    Decreased range of motion of left elbow Yes    Weakness of left upper extremity     Left elbow pain          Physician: Marilu Edwards NP    Visit Date: 1/18/2023    Physician Orders: PT Evaluate and Treat   Medical Diagnosis: M70.32 (ICD-10-CM) - Bursitis of left elbow, unspecified bursa  Evaluation Date: 12/5/22  Authorization Period Expiration: 12/31/22  Plan of Care Certification Period: 1/18/23- 3/18/23  Progress Note Due: 2/18/23  Visit # / Visits authorized: 5/20  FOTO: Discharged      Precautions: Progress as tolerated by pt.    Time In: 0900am  Time Out: 1008am  Total Billable Time: 68 minutes      SUBJECTIVE     Pt reports: that she is doing well. Pt states that she is able to tolerate carrying groceries, lifting objects overhead, and driving with no instance of elbow pain. Pt states that she feels some discomfort if her elbow is in the same position for a long amount of time. Pt feels that she is nearing the end of her rehabilitation.    She was compliant with home exercise program.  Response to previous treatment: decreased L elbow pain and improved ability to reach overhead  Functional change: improved strength L elbow for driving and carrying groceries, Pt self reports that she is nearing her baseline.     Pain: 0/10  Location: left elbow      OBJECTIVE       1/18/23:    Observation: Pt no longer wearing sling or immobilizer donned to L elbow  Integumentary: Pt's surgical incision healing well with no signs/symptoms of infection. PT educated pt on the signs/symptoms of infection (increased HR, BP, fever, etc.).             Elbow  Right    Left   Pain/Dysfunction with Movement     AROM MMT AROM MMT     flexion 145 5 140 4+     extension 0 5 +5 4+ Slightly lacking extension   pronation 60 5 60 5     supination  "60 5 60 5           Upper Extremity Strength  (R) UE   (L) UE     Shoulder flexion: 5/5 Shoulder flexion: 5/5   Shoulder Abduction: 5/5 Shoulder abduction: 5/5   Shoulder ER 5/5 Shoulder ER 5/5   Shoulder IR 5/5 Shoulder IR 5/5   Wrist flexion: 5/5 Wrist flexion: 5/5   Wrist extension: 5/5 Wrist extension: 5/5         Palpation: Pt no longer TTP to olecranon        Sensation: BUE grossly intact    TREATMENT       Charges based on 1:1 tx:  Marti received the treatments listed below:      Patient received therapeutic exercises for 60 minutes for improved strength and AROM including:  L shoulder ER/IR, red band, 30x ea  +Rows vs green TB 3x12 repetitions  +Bicep curls vs 4# 3x10 repetitions  Towel wringing vs red therabar 30x 5" hold  U and Rainbows vs red therabar 30x5" hold  Velcro board, large paddle twist, x3 min  Tricep kickback vs 3# 3x10 repetitions  +Shoulder extension vs GTB 3x10 repetitions  +Tricep Elbow extension vs GTB 3x12 repetitions  Wall pushups 20x  +Body blade with elbow at side and 90 degrees flexion 20" x3 repetitions  +Supine Alphabet vs 3# 2 sets  +[place] carries around gym 3# , 3 laps with a break between each lap  +Updated Plan of Care         Pt received therapeutic activities for 8 minutes for improved tolerance to functional activities including:  UBE 3min/3min  Ball circles on wall for L elbow endurance 30x CW/ 30x CCW with red weighted ball today          PATIENT EDUCATION AND HOME EXERCISES     Home Exercises Provided and Patient Education Provided     Education provided:   PT educated pt on importance of compliance with their HEP this visit.     Written Home Exercises Provided: Patient instructed to cont prior HEP. Exercises were reviewed and Marti was able to demonstrate them prior to the end of the session.  Marti demonstrated good  understanding of the education provided. See EMR under Patient Instructions for exercises provided during therapy " sessions    ASSESSMENT   Pt presents to physical therapy treatment with decreased active range of motion, decreased strength, poor posture, and increased pain due to left elbow impairments. These factors are negatively impacting the patient's ability to complete their activities of daily living and work duties. Pt is progressing towards their short and long term goals as evidenced by decreased pain, improved strength and range of motion, and improved FOTO score. These goals remain appropriate for the plan of care. Pt would benefit from continued skilled physical therapy treatment to address these deficits so that they may return to their prior level of function with improved quality of life.      Marti Is progressing well towards her goals.   Pt prognosis is Good.     Pt will continue to benefit from skilled outpatient physical therapy to address the deficits listed in the problem list box on initial evaluation, provide pt/family education and to maximize pt's level of independence in the home and community environment.     Pt's spiritual, cultural and educational needs considered and pt agreeable to plan of care and goals.     Anticipated barriers to physical therapy: None      Goals:  Short Term Goals (6 Weeks):   1. Patient will increase L Elbow flexion AROM to 140 degrees to improve functional reach (MET, 1/18/23)  2. Patient will increase L Elbow extension to 0 degrees to improve overhead reach (progressing, not met)    3. Patient to report < or =2/10 pain in L Elbow with ADL's (MET, 1/18/23)  4. Patient to be able to drive without limitation or difficulty (MET, 1/18/23)  Long Term Goals (12 Weeks):   1. Patient to have decreased subjective report of disability as noted by a score of <40% on the FOTO elbow questionnaire (MET, 1/18/23)  2. Patient to be independent with home exercise program for improved self management of condition  (MET, 1/18/23)  3. Patient will increase strength in L upper extremity to  4+/5 or greater for return to recreation  (MET, 1/18/23)  4. Patient to be able to carry 7# 80 ft with little to no difficulty for ease with household chores and shopping   (MET, 1/18/23)    PLAN   Plan of care Certification: 1/18/23- 3/18/23     Outpatient Physical Therapy 2 times weekly for 12 weeks to include the following interventions: Gait Training, Manual Therapy, Moist Heat/ Ice, Neuromuscular Re-ed, Paraffin, Patient Education, Therapeutic Activities, and Therapeutic Exercise.          Zofia Jarrett, PT

## 2023-01-18 NOTE — PROGRESS NOTES
[facility]   Physical Therapy Updated Plan of Care    Name: Marti Coley  Clinic Number: [id number]    Therapy Diagnosis:   Encounter Diagnoses   Name Primary?    Decreased range of motion of left elbow Yes    Weakness of left upper extremity     Left elbow pain          Physician: Marilu Edwards NP    Visit Date: 1/18/2023    Physician Orders: PT Evaluate and Treat   Medical Diagnosis: M70.32 (ICD-10-CM) - Bursitis of left elbow, unspecified bursa  Evaluation Date: 12/5/22  Authorization Period Expiration: 12/31/22  Plan of Care Certification Period: 1/18/23- 3/18/23  Progress Note Due: 2/18/23  Visit # / Visits authorized: 5/20  FOTO: Discharged      Precautions: Progress as tolerated by pt.    Time In: 0900am  Time Out: 1008am  Total Billable Time: 68 minutes      SUBJECTIVE     Pt reports: that she is doing well. Pt states that she is able to tolerate carrying groceries, lifting objects overhead, and driving with no instance of elbow pain. Pt states that she feels some discomfort if her elbow is in the same position for a long amount of time. Pt feels that she is nearing the end of her rehabilitation.    She was compliant with home exercise program.  Response to previous treatment: decreased L elbow pain and improved ability to reach overhead  Functional change: improved strength L elbow for driving and carrying groceries, Pt self reports that she is nearing her baseline.     Pain: 0/10  Location: left elbow      OBJECTIVE       1/18/23:    Observation: Pt no longer wearing sling or immobilizer donned to L elbow  Integumentary: Pt's surgical incision healing well with no signs/symptoms of infection. PT educated pt on the signs/symptoms of infection (increased HR, BP, fever, etc.).             Elbow  Right    Left   Pain/Dysfunction with Movement     AROM MMT AROM MMT     flexion 145 5 140 4+     extension 0 5 +5 4+ Slightly lacking extension   pronation 60 5 60 5     supination  "60 5 60 5           Upper Extremity Strength  (R) UE   (L) UE     Shoulder flexion: 5/5 Shoulder flexion: 5/5   Shoulder Abduction: 5/5 Shoulder abduction: 5/5   Shoulder ER 5/5 Shoulder ER 5/5   Shoulder IR 5/5 Shoulder IR 5/5   Wrist flexion: 5/5 Wrist flexion: 5/5   Wrist extension: 5/5 Wrist extension: 5/5         Palpation: Pt no longer TTP to olecranon        Sensation: BUE grossly intact    TREATMENT       Charges based on 1:1 tx:  Marti received the treatments listed below:      Patient received therapeutic exercises for 60 minutes for improved strength and AROM including:  L shoulder ER/IR, red band, 30x ea  +Rows vs green TB 3x12 repetitions  +Bicep curls vs 4# 3x10 repetitions  Towel wringing vs red therabar 30x 5" hold  U and Rainbows vs red therabar 30x5" hold  Velcro board, large paddle twist, x3 min  Tricep kickback vs 3# 3x10 repetitions  +Shoulder extension vs GTB 3x10 repetitions  +Tricep Elbow extension vs GTB 3x12 repetitions  Wall pushups 20x  +Body blade with elbow at side and 90 degrees flexion 20" x3 repetitions  +Supine Alphabet vs 3# 2 sets  +Statue of [place] carries around gym 3# , 3 laps with a break between each lap  +Updated Plan of Care         Pt received therapeutic activities for 8 minutes for improved tolerance to functional activities including:  UBE 3min/3min  Ball circles on wall for L elbow endurance 30x CW/ 30x CCW with red weighted ball today          PATIENT EDUCATION AND HOME EXERCISES     Home Exercises Provided and Patient Education Provided     Education provided:   PT educated pt on importance of compliance with their HEP this visit.     Written Home Exercises Provided: Patient instructed to cont prior HEP. Exercises were reviewed and Marti was able to demonstrate them prior to the end of the session.  Marti demonstrated good  understanding of the education provided. See EMR under Patient Instructions for exercises provided during therapy " sessions    ASSESSMENT   Pt presents to physical therapy treatment with decreased active range of motion, decreased strength, poor posture, and increased pain due to left elbow impairments. These factors are negatively impacting the patient's ability to complete their activities of daily living and work duties. Pt is progressing towards their short and long term goals as evidenced by decreased pain, improved strength and range of motion, and improved FOTO score. These goals remain appropriate for the plan of care. Pt would benefit from continued skilled physical therapy treatment to address these deficits so that they may return to their prior level of function with improved quality of life.      Marti Is progressing well towards her goals.   Pt prognosis is Good.     Pt will continue to benefit from skilled outpatient physical therapy to address the deficits listed in the problem list box on initial evaluation, provide pt/family education and to maximize pt's level of independence in the home and community environment.     Pt's spiritual, cultural and educational needs considered and pt agreeable to plan of care and goals.     Anticipated barriers to physical therapy: None      Goals:  Short Term Goals (6 Weeks):   1. Patient will increase L Elbow flexion AROM to 140 degrees to improve functional reach (MET, 1/18/23)  2. Patient will increase L Elbow extension to 0 degrees to improve overhead reach (progressing, not met)    3. Patient to report < or =2/10 pain in L Elbow with ADL's (MET, 1/18/23)  4. Patient to be able to drive without limitation or difficulty (MET, 1/18/23)  Long Term Goals (12 Weeks):   1. Patient to have decreased subjective report of disability as noted by a score of <40% on the FOTO elbow questionnaire (MET, 1/18/23)  2. Patient to be independent with home exercise program for improved self management of condition  (MET, 1/18/23)  3. Patient will increase strength in L upper extremity to  4+/5 or greater for return to recreation  (MET, 1/18/23)  4. Patient to be able to carry 7# 80 ft with little to no difficulty for ease with household chores and shopping   (MET, 1/18/23)    PLAN   Plan of care Certification: 1/18/23- 3/18/23     Outpatient Physical Therapy 2 times weekly for 12 weeks to include the following interventions: Gait Training, Manual Therapy, Moist Heat/ Ice, Neuromuscular Re-ed, Paraffin, Patient Education, Therapeutic Activities, and Therapeutic Exercise.          Zofia Jarrett, PT

## 2023-01-24 ENCOUNTER — CLINICAL SUPPORT (OUTPATIENT)
Dept: REHABILITATION | Facility: OTHER | Age: 68
End: 2023-01-24
Payer: MEDICARE

## 2023-01-24 DIAGNOSIS — M25.622 DECREASED RANGE OF MOTION OF LEFT ELBOW: Primary | ICD-10-CM

## 2023-01-24 DIAGNOSIS — R29.898 WEAKNESS OF LEFT UPPER EXTREMITY: ICD-10-CM

## 2023-01-24 DIAGNOSIS — M25.522 LEFT ELBOW PAIN: ICD-10-CM

## 2023-01-24 PROCEDURE — 97530 THERAPEUTIC ACTIVITIES: CPT | Mod: PN

## 2023-01-24 PROCEDURE — 97110 THERAPEUTIC EXERCISES: CPT | Mod: PN

## 2023-02-02 ENCOUNTER — TELEPHONE (OUTPATIENT)
Dept: ORTHOPEDICS | Facility: CLINIC | Age: 68
End: 2023-02-02
Payer: MEDICARE

## 2023-02-07 ENCOUNTER — OFFICE VISIT (OUTPATIENT)
Dept: ORTHOPEDICS | Facility: CLINIC | Age: 68
End: 2023-02-07
Payer: MEDICARE

## 2023-02-07 VITALS
HEART RATE: 81 BPM | HEIGHT: 63 IN | DIASTOLIC BLOOD PRESSURE: 75 MMHG | SYSTOLIC BLOOD PRESSURE: 126 MMHG | BODY MASS INDEX: 30.3 KG/M2 | WEIGHT: 171 LBS

## 2023-02-07 DIAGNOSIS — M70.22 OLECRANON BURSITIS OF LEFT ELBOW: Primary | ICD-10-CM

## 2023-02-07 DIAGNOSIS — Z47.89 ORTHOPEDIC AFTERCARE: ICD-10-CM

## 2023-02-07 DIAGNOSIS — R22.32 ELBOW MASS, LEFT: ICD-10-CM

## 2023-02-07 PROCEDURE — 99999 PR PBB SHADOW E&M-EST. PATIENT-LVL III: CPT | Mod: PBBFAC,,, | Performed by: PHYSICIAN ASSISTANT

## 2023-02-07 PROCEDURE — 1125F PR PAIN SEVERITY QUANTIFIED, PAIN PRESENT: ICD-10-PCS | Mod: CPTII,S$GLB,, | Performed by: PHYSICIAN ASSISTANT

## 2023-02-07 PROCEDURE — 3078F PR MOST RECENT DIASTOLIC BLOOD PRESSURE < 80 MM HG: ICD-10-PCS | Mod: CPTII,S$GLB,, | Performed by: PHYSICIAN ASSISTANT

## 2023-02-07 PROCEDURE — 1160F RVW MEDS BY RX/DR IN RCRD: CPT | Mod: CPTII,S$GLB,, | Performed by: PHYSICIAN ASSISTANT

## 2023-02-07 PROCEDURE — 1159F PR MEDICATION LIST DOCUMENTED IN MEDICAL RECORD: ICD-10-PCS | Mod: CPTII,S$GLB,, | Performed by: PHYSICIAN ASSISTANT

## 2023-02-07 PROCEDURE — 99999 PR PBB SHADOW E&M-EST. PATIENT-LVL III: ICD-10-PCS | Mod: PBBFAC,,, | Performed by: PHYSICIAN ASSISTANT

## 2023-02-07 PROCEDURE — 99024 POSTOP FOLLOW-UP VISIT: CPT | Mod: S$GLB,,, | Performed by: PHYSICIAN ASSISTANT

## 2023-02-07 PROCEDURE — 1160F PR REVIEW ALL MEDS BY PRESCRIBER/CLIN PHARMACIST DOCUMENTED: ICD-10-PCS | Mod: CPTII,S$GLB,, | Performed by: PHYSICIAN ASSISTANT

## 2023-02-07 PROCEDURE — 1159F MED LIST DOCD IN RCRD: CPT | Mod: CPTII,S$GLB,, | Performed by: PHYSICIAN ASSISTANT

## 2023-02-07 PROCEDURE — 3288F PR FALLS RISK ASSESSMENT DOCUMENTED: ICD-10-PCS | Mod: CPTII,S$GLB,, | Performed by: PHYSICIAN ASSISTANT

## 2023-02-07 PROCEDURE — 1157F ADVNC CARE PLAN IN RCRD: CPT | Mod: CPTII,S$GLB,, | Performed by: PHYSICIAN ASSISTANT

## 2023-02-07 PROCEDURE — 1125F AMNT PAIN NOTED PAIN PRSNT: CPT | Mod: CPTII,S$GLB,, | Performed by: PHYSICIAN ASSISTANT

## 2023-02-07 PROCEDURE — 1101F PR PT FALLS ASSESS DOC 0-1 FALLS W/OUT INJ PAST YR: ICD-10-PCS | Mod: CPTII,S$GLB,, | Performed by: PHYSICIAN ASSISTANT

## 2023-02-07 PROCEDURE — 1101F PT FALLS ASSESS-DOCD LE1/YR: CPT | Mod: CPTII,S$GLB,, | Performed by: PHYSICIAN ASSISTANT

## 2023-02-07 PROCEDURE — 99024 PR POST-OP FOLLOW-UP VISIT: ICD-10-PCS | Mod: S$GLB,,, | Performed by: PHYSICIAN ASSISTANT

## 2023-02-07 PROCEDURE — 3008F PR BODY MASS INDEX (BMI) DOCUMENTED: ICD-10-PCS | Mod: CPTII,S$GLB,, | Performed by: PHYSICIAN ASSISTANT

## 2023-02-07 PROCEDURE — 3288F FALL RISK ASSESSMENT DOCD: CPT | Mod: CPTII,S$GLB,, | Performed by: PHYSICIAN ASSISTANT

## 2023-02-07 PROCEDURE — 3078F DIAST BP <80 MM HG: CPT | Mod: CPTII,S$GLB,, | Performed by: PHYSICIAN ASSISTANT

## 2023-02-07 PROCEDURE — 3074F SYST BP LT 130 MM HG: CPT | Mod: CPTII,S$GLB,, | Performed by: PHYSICIAN ASSISTANT

## 2023-02-07 PROCEDURE — 3008F BODY MASS INDEX DOCD: CPT | Mod: CPTII,S$GLB,, | Performed by: PHYSICIAN ASSISTANT

## 2023-02-07 PROCEDURE — 1157F PR ADVANCE CARE PLAN OR EQUIV PRESENT IN MEDICAL RECORD: ICD-10-PCS | Mod: CPTII,S$GLB,, | Performed by: PHYSICIAN ASSISTANT

## 2023-02-07 PROCEDURE — 3074F PR MOST RECENT SYSTOLIC BLOOD PRESSURE < 130 MM HG: ICD-10-PCS | Mod: CPTII,S$GLB,, | Performed by: PHYSICIAN ASSISTANT

## 2023-02-07 NOTE — PROGRESS NOTES
"Ms. Coley is here today for a post-operative visit.  She is 83 days status post left olecranon bursectomy with skin excision for wound closure by Dr. Freitas on 11/16/2022. She reports that she is doing well, motion and pain are improved.  She does continue to feel a mobile lump at the scar. It is bothersome to the patient but not very painful, she says that she doesn't like knowing it is there.  She completed OT. No current pain.  She denies fever, chills, and sweats since the time of the surgery.     PATHOLOGY:  "left olecranon bursa", excision:       - Consistent with chronic bursitis    MICROBIOLOGY:  Aerobic Bacterial Culture No growth      Gram Stain Result Rare WBC's    Gram Stain Result No organisms seen      Anaerobic Culture No anaerobes isolated        Physical exam:    Vitals:    02/07/23 1059   BP: 126/75   Pulse: 81   Weight: 77.6 kg (171 lb)   Height: 5' 3" (1.6 m)   PainSc:   2       Vital signs are stable, patient is afebrile.  Patient is well dressed and well groomed, no acute distress.  Alert and oriented to person, place, and time.  Incision is healing well - clean, dry, and intact. No significant thickening at the scar, no residual edema.  Small 1 cm mobile lump at the distal aspect of the surgical site, possible cyst vs fat necrosis.  There is no erythema or exudate.  There is no sign of any infection. She is NVI.  Good finger and wrist range of motion.    Assessment: 83 days status post left olecranon bursectomy with skin excision for wound closure    Plan:  Marti was seen today for post-op evaluation.    Diagnoses and all orders for this visit:    Olecranon bursitis of left elbow    Orthopedic aftercare    Elbow mass, left  -     US Extremity Non Vascular Limited Left; Future              - OT as scheduled, gradual strengthening  - Discussed further evaluation of patient's lump with US; US ordered  - Discussed scar massage  - Follow up after US  - Call with questions or concerns    Per " OP note: POSTOPERATIVE PLAN:  Follow-up 2 weeks for wound check  Nonweightbearing left upper extremity  Begin physical therapy at 2 weeks, order placed.   Follow-up pathology results

## 2023-02-14 ENCOUNTER — HOSPITAL ENCOUNTER (OUTPATIENT)
Dept: RADIOLOGY | Facility: OTHER | Age: 68
Discharge: HOME | End: 2023-02-14
Attending: PHYSICIAN ASSISTANT
Payer: MEDICARE

## 2023-02-14 DIAGNOSIS — R22.32 ELBOW MASS, LEFT: ICD-10-CM

## 2023-02-14 PROCEDURE — 76882 US LMTD JT/FCL EVL NVASC XTR: CPT | Mod: 26,LT,, | Performed by: RADIOLOGY

## 2023-02-14 PROCEDURE — 76882 US LMTD JT/FCL EVL NVASC XTR: CPT | Mod: TC,LT

## 2023-02-14 PROCEDURE — 76882 US EXTREMITY NON VASCULAR LIMITED LEFT: ICD-10-PCS | Mod: 26,LT,, | Performed by: RADIOLOGY

## 2023-02-16 ENCOUNTER — OFFICE VISIT (OUTPATIENT)
Dept: ORTHOPEDICS | Facility: CLINIC | Age: 68
End: 2023-02-16
Payer: MEDICARE

## 2023-02-16 VITALS — HEIGHT: 63 IN | WEIGHT: 171 LBS | BODY MASS INDEX: 30.3 KG/M2

## 2023-02-16 DIAGNOSIS — R22.32 MASS OF LEFT UPPER EXTREMITY: Primary | ICD-10-CM

## 2023-02-16 PROCEDURE — 99214 PR OFFICE/OUTPT VISIT, EST, LEVL IV, 30-39 MIN: ICD-10-PCS | Mod: S$GLB,,, | Performed by: ORTHOPAEDIC SURGERY

## 2023-02-16 PROCEDURE — 1157F PR ADVANCE CARE PLAN OR EQUIV PRESENT IN MEDICAL RECORD: ICD-10-PCS | Mod: CPTII,S$GLB,, | Performed by: ORTHOPAEDIC SURGERY

## 2023-02-16 PROCEDURE — 99999 PR PBB SHADOW E&M-EST. PATIENT-LVL II: ICD-10-PCS | Mod: PBBFAC,,, | Performed by: ORTHOPAEDIC SURGERY

## 2023-02-16 PROCEDURE — 1159F MED LIST DOCD IN RCRD: CPT | Mod: CPTII,S$GLB,, | Performed by: ORTHOPAEDIC SURGERY

## 2023-02-16 PROCEDURE — 99214 OFFICE O/P EST MOD 30 MIN: CPT | Mod: S$GLB,,, | Performed by: ORTHOPAEDIC SURGERY

## 2023-02-16 PROCEDURE — 3008F BODY MASS INDEX DOCD: CPT | Mod: CPTII,S$GLB,, | Performed by: ORTHOPAEDIC SURGERY

## 2023-02-16 PROCEDURE — 99999 PR PBB SHADOW E&M-EST. PATIENT-LVL II: CPT | Mod: PBBFAC,,, | Performed by: ORTHOPAEDIC SURGERY

## 2023-02-16 PROCEDURE — 1157F ADVNC CARE PLAN IN RCRD: CPT | Mod: CPTII,S$GLB,, | Performed by: ORTHOPAEDIC SURGERY

## 2023-02-16 PROCEDURE — 1159F PR MEDICATION LIST DOCUMENTED IN MEDICAL RECORD: ICD-10-PCS | Mod: CPTII,S$GLB,, | Performed by: ORTHOPAEDIC SURGERY

## 2023-02-16 PROCEDURE — 3008F PR BODY MASS INDEX (BMI) DOCUMENTED: ICD-10-PCS | Mod: CPTII,S$GLB,, | Performed by: ORTHOPAEDIC SURGERY

## 2023-02-16 NOTE — PROGRESS NOTES
Subjective:      Patient ID: Marti Coley is a 67 y.o. female.    Chief Complaint: Pain of the Left Elbow      HPI  Marti Coley is a 67 y.o. female presenting today for evaluation of the left elbow, US results follow up. She is status post left olecranon bursectomy with skin excision for wound closure by Dr. Freitas on 11/16/2022. She reports a small mass at the very distal incision. It is minimally bothersome. Recent US obtained, results below. She has minimal sensitivity at the incision she was initially doing scar massaged but has d/c'ed this.       Review of patient's allergies indicates:   Allergen Reactions    Ciprofloxacin Rash    Penicillins Rash         Current Outpatient Medications   Medication Sig Dispense Refill    amLODIPine (NORVASC) 5 MG tablet Take 1 tablet (5 mg total) by mouth once daily. 90 tablet 3    celecoxib (CELEBREX) 200 MG capsule TAKE 1 CAPSULE BY MOUTH TWICE A DAY 90 capsule 1    clobetasol 0.05% (TEMOVATE) 0.05 % Oint Apply topically 2 (two) times daily. 30 g 6    clotrimazole-betamethasone 1-0.05% (LOTRISONE) cream Apply topically 2 (two) times daily. Apply to affected area 15 g 6    ergocalciferol, vitamin D2, (VITAMIN D ORAL)       meloxicam (MOBIC) 15 MG tablet Take 15 mg by mouth daily as needed.      omega-3 fatty acids/fish oil (FISH OIL-OMEGA-3 FATTY ACIDS) 300-1,000 mg capsule Take 1 capsule by mouth once daily.       No current facility-administered medications for this visit.       Past Medical History:   Diagnosis Date    Allergy     Breast cancer 2008    left diagnosed in 2008 - underwent chemotherapy and radiation after lumpectomy    Cancer 4/2008    left IDC, neoadjuvant chemo, 7mm residual IDC lumpectomy, negative Sn biopsy, adjuvant XRT and hormonal therapy with tamoxifen, ER/TN+, HER-2 negtive    Genetic testing 4/28/2008     negative MultiSite BRACAnalysis    HTN (hypertension)     Hyperlipidemia     Invasive lobular carcinoma of breast, stage 1 7/11/2012  "   Osteoarthritis        Past Surgical History:   Procedure Laterality Date    BREAST LUMPECTOMY Left     BREAST SURGERY  2008    left lumpectomy with negative SN biopsy    BURSECTOMY OF ELBOW Left 2022    Procedure: BURSECTOMY, ELBOW;  Surgeon: La Freitas MD;  Location: [facility];  Service: Orthopedics;  Laterality: Left;    CATARACT EXTRACTION W/  INTRAOCULAR LENS IMPLANT Right     COLONOSCOPY N/A 2022    Procedure: COLONOSCOPY;  Surgeon: Arnaldo Gallagher MD;  Location: [facility] ([address] [place] [address]);  Service: Endoscopy;  Laterality: N/A;  order -new order placed-original order placed by Dr. Lucero Mccrary  Clear liquids up to 2 hrs prior/ AM prep 2am-3am - st   fully vaccinated; instructions to portal-st        ENDOMETRIAL BIOPSY  2011       Review of Systems:  Constitutional: Negative for chills and fever.   Respiratory: Negative for cough and shortness of breath.    Gastrointestinal: Negative for nausea and vomiting.   Skin: Negative for rash.   Neurological: Negative for dizziness and headaches.   Psychiatric/Behavioral: Negative for depression.   MSK as in HPI       OBJECTIVE:     PHYSICAL EXAM:  Ht 5' 3" (1.6 m)   Wt 77.6 kg (171 lb)   LMP 2008 (Approximate) Comment: 3/2008  BMI 30.29 kg/m²     GEN:  NAD, well-developed, well-groomed.  NEURO: Awake, alert, and oriented. Normal attention and concentration.    PSYCH: Normal mood and affect. Behavior is normal.  HEENT: No cervical lymphadenopathy noted.  CARDIOVASCULAR: Radial pulses 2+ bilaterally. No LE edema noted.  PULMONARY: Breath sounds normal. No respiratory distress.  SKIN: Intact, no rashes.      MSK:   LUE:  Good active ROM of the elbow wrist and fingers. Well healed prior surgical incision over the olecranon. There is a very small, mobile mass, non tender to palpation at the distal incision. AIN/PIN/Radial/Median/Ulnar Nerves assessed in isolation without deficit. Radial & Ulnar arteries palpated 2+. " "Capillary Refill <3s.      RADIOGRAPHS:  Xray left elbow 10/26/22   FINDINGS:  No acute fracture or dislocation identified.  No elbow joint effusion.   Progressive soft tissue thickening and edema along the posterior aspect of the elbow.   Impression:   Please see above.    US LUE 2/14/23   FINDINGS:  Images labeled "superior to the incision site" reveal a linear anechoic focus in the subcutaneous tissues.  Associated complex oval hypoechoic focus measures 8 x 8 x 4 mm.  No internal vascularity.  Findings are nonspecific and could represent a hematoma.  Repeat imaging recommended should the abnormality enlarge.   Impression:   Please see above.  Comments: I have personally reviewed the imaging and I agree with the above radiologist's report.    ASSESSMENT/PLAN:       ICD-10-CM ICD-9-CM   1. Mass of left upper extremity  R22.32 782.2          No orders of the defined types were placed in this encounter.    Plan:   US reviewed, treatment options discussed. Patient wishes to proceed with mass excision of the left upper extremity. Consents reviewed and signed in clinic all questions answered. RTC PO.        The patient indicates understanding of these issues and agrees to the plan.    Evelyne Cee PA-C  [facility]   [facility]      "

## 2023-02-20 NOTE — PROGRESS NOTES
I have personally taken the history and examined this patient. I agree with the resident's note as stated above.      Plan:   US reviewed, treatment options discussed. Patient wishes to proceed with mass excision of the left upper extremity. Consents reviewed and signed in clinic all questions answered. RTC PO.

## 2023-03-02 DIAGNOSIS — R22.32 MASS OF LEFT UPPER EXTREMITY: Primary | ICD-10-CM

## 2023-03-02 DIAGNOSIS — R22.32 ELBOW MASS, LEFT: ICD-10-CM

## 2023-03-15 ENCOUNTER — ANESTHESIA EVENT (OUTPATIENT)
Dept: SURGERY | Facility: HOSPITAL | Age: 68
End: 2023-03-15
Payer: MEDICARE

## 2023-03-16 DIAGNOSIS — Z98.890 POST-OPERATIVE STATE: Primary | ICD-10-CM

## 2023-03-16 RX ORDER — TRAMADOL HYDROCHLORIDE 50 MG/1
50 TABLET ORAL EVERY 6 HOURS PRN
Qty: 10 TABLET | Refills: 0 | Status: SHIPPED | OUTPATIENT
Start: 2023-03-16 | End: 2023-08-22

## 2023-03-17 ENCOUNTER — TELEPHONE (OUTPATIENT)
Dept: ORTHOPEDICS | Facility: CLINIC | Age: 68
End: 2023-03-17
Payer: MEDICARE

## 2023-03-17 NOTE — TELEPHONE ENCOUNTER
Spoke c pt. Informed pt of 7:00 a.m. arrival time for 03/20/23 surgery at the [facility]. Reminded pt of NPO status & PO appt. Pt expressed understanding & was thankful.

## 2023-03-19 DIAGNOSIS — R22.32 MASS OF LEFT UPPER EXTREMITY: Primary | ICD-10-CM

## 2023-03-19 RX ORDER — MUPIROCIN 20 MG/G
OINTMENT TOPICAL
Status: CANCELLED | OUTPATIENT
Start: 2023-03-19

## 2023-03-19 NOTE — H&P (VIEW-ONLY)
Subjective:       Patient ID: Marti Coley is a 67 y.o. female.     Chief Complaint: Pain of the Left Elbow        HPI  Marti Coley is a 67 y.o. female presenting today for evaluation of the left elbow, US results follow up. She is status post left olecranon bursectomy with skin excision for wound closure by Dr. Freitas on 11/16/2022. She reports a small mass at the very distal incision. It is minimally bothersome. Recent US obtained, results below. She has minimal sensitivity at the incision she was initially doing scar massaged but has d/c'ed this.              Review of patient's allergies indicates:   Allergen Reactions    Ciprofloxacin Rash    Penicillins Rash          Current Medications          Current Outpatient Medications   Medication Sig Dispense Refill    amLODIPine (NORVASC) 5 MG tablet Take 1 tablet (5 mg total) by mouth once daily. 90 tablet 3    celecoxib (CELEBREX) 200 MG capsule TAKE 1 CAPSULE BY MOUTH TWICE A DAY 90 capsule 1    clobetasol 0.05% (TEMOVATE) 0.05 % Oint Apply topically 2 (two) times daily. 30 g 6    clotrimazole-betamethasone 1-0.05% (LOTRISONE) cream Apply topically 2 (two) times daily. Apply to affected area 15 g 6    ergocalciferol, vitamin D2, (VITAMIN D ORAL)          meloxicam (MOBIC) 15 MG tablet Take 15 mg by mouth daily as needed.        omega-3 fatty acids/fish oil (FISH OIL-OMEGA-3 FATTY ACIDS) 300-1,000 mg capsule Take 1 capsule by mouth once daily.          No current facility-administered medications for this visit.                 Past Medical History:   Diagnosis Date    Allergy      Breast cancer 2008     left diagnosed in 2008 - underwent chemotherapy and radiation after lumpectomy    Cancer 4/2008     left IDC, neoadjuvant chemo, 7mm residual IDC lumpectomy, negative Sn biopsy, adjuvant XRT and hormonal therapy with tamoxifen, ER/FL+, HER-2 negtive    Genetic testing 4/28/2008      negative MultiSite BRACAnalysis    HTN (hypertension)       "Hyperlipidemia      Invasive lobular carcinoma of breast, stage 1 2012    Osteoarthritis                 Past Surgical History:   Procedure Laterality Date    BREAST LUMPECTOMY Left     BREAST SURGERY   2008     left lumpectomy with negative SN biopsy    BURSECTOMY OF ELBOW Left 2022     Procedure: BURSECTOMY, ELBOW;  Surgeon: La Freitas MD;  Location: [facility];  Service: Orthopedics;  Laterality: Left;    CATARACT EXTRACTION W/  INTRAOCULAR LENS IMPLANT Right      COLONOSCOPY N/A 2022     Procedure: COLONOSCOPY;  Surgeon: Arnaldo Gallagher MD;  Location: [facility] ([address] [place] [address]);  Service: Endoscopy;  Laterality: N/A;  order -new order placed-original order placed by Dr. Lucero Mccrary  Clear liquids up to 2 hrs prior/ AM prep 2am-3am - st   fully vaccinated; instructions to portal-st          ENDOMETRIAL BIOPSY   2011         Review of Systems:  Constitutional: Negative for chills and fever.   Respiratory: Negative for cough and shortness of breath.    Gastrointestinal: Negative for nausea and vomiting.   Skin: Negative for rash.   Neurological: Negative for dizziness and headaches.   Psychiatric/Behavioral: Negative for depression.   MSK as in HPI         OBJECTIVE:      PHYSICAL EXAM:  Ht 5' 3" (1.6 m)   Wt 77.6 kg (171 lb)   LMP 2008 (Approximate) Comment: 3/2008  BMI 30.29 kg/m²      GEN:  NAD, well-developed, well-groomed.  NEURO: Awake, alert, and oriented. Normal attention and concentration.    PSYCH: Normal mood and affect. Behavior is normal.  HEENT: No cervical lymphadenopathy noted.  CARDIOVASCULAR: Radial pulses 2+ bilaterally. No LE edema noted.  PULMONARY: Breath sounds normal. No respiratory distress.  SKIN: Intact, no rashes.       MSK:   LUE:  Good active ROM of the elbow wrist and fingers. Well healed prior surgical incision over the olecranon. There is a very small, mobile mass, non tender to palpation at the distal incision. " "AIN/PIN/Radial/Median/Ulnar Nerves assessed in isolation without deficit. Radial & Ulnar arteries palpated 2+. Capillary Refill <3s.        RADIOGRAPHS:  Xray left elbow 10/26/22   FINDINGS:  No acute fracture or dislocation identified.  No elbow joint effusion.   Progressive soft tissue thickening and edema along the posterior aspect of the elbow.   Impression:   Please see above.     US LUE 2/14/23   FINDINGS:  Images labeled "superior to the incision site" reveal a linear anechoic focus in the subcutaneous tissues.  Associated complex oval hypoechoic focus measures 8 x 8 x 4 mm.  No internal vascularity.  Findings are nonspecific and could represent a hematoma.  Repeat imaging recommended should the abnormality enlarge.   Impression:   Please see above.  Comments: I have personally reviewed the imaging and I agree with the above radiologist's report.     ASSESSMENT/PLAN:          ICD-10-CM ICD-9-CM   1. Mass of left upper extremity  R22.32 782.2         No orders of the defined types were placed in this encounter.     Plan:   US reviewed, treatment options discussed. Patient wishes to proceed with mass excision of the left upper extremity. Consents reviewed and signed in clinic all questions answered. RTC PO.           The patient indicates understanding of these issues and agrees to the plan.  "

## 2023-03-19 NOTE — H&P
Subjective:       Patient ID: Marti Coley is a 67 y.o. female.     Chief Complaint: Pain of the Left Elbow        HPI  Marti Coley is a 67 y.o. female presenting today for evaluation of the left elbow, US results follow up. She is status post left olecranon bursectomy with skin excision for wound closure by Dr. Freitas on 11/16/2022. She reports a small mass at the very distal incision. It is minimally bothersome. Recent US obtained, results below. She has minimal sensitivity at the incision she was initially doing scar massaged but has d/c'ed this.              Review of patient's allergies indicates:   Allergen Reactions    Ciprofloxacin Rash    Penicillins Rash          Current Medications          Current Outpatient Medications   Medication Sig Dispense Refill    amLODIPine (NORVASC) 5 MG tablet Take 1 tablet (5 mg total) by mouth once daily. 90 tablet 3    celecoxib (CELEBREX) 200 MG capsule TAKE 1 CAPSULE BY MOUTH TWICE A DAY 90 capsule 1    clobetasol 0.05% (TEMOVATE) 0.05 % Oint Apply topically 2 (two) times daily. 30 g 6    clotrimazole-betamethasone 1-0.05% (LOTRISONE) cream Apply topically 2 (two) times daily. Apply to affected area 15 g 6    ergocalciferol, vitamin D2, (VITAMIN D ORAL)          meloxicam (MOBIC) 15 MG tablet Take 15 mg by mouth daily as needed.        omega-3 fatty acids/fish oil (FISH OIL-OMEGA-3 FATTY ACIDS) 300-1,000 mg capsule Take 1 capsule by mouth once daily.          No current facility-administered medications for this visit.                 Past Medical History:   Diagnosis Date    Allergy      Breast cancer 2008     left diagnosed in 2008 - underwent chemotherapy and radiation after lumpectomy    Cancer 4/2008     left IDC, neoadjuvant chemo, 7mm residual IDC lumpectomy, negative Sn biopsy, adjuvant XRT and hormonal therapy with tamoxifen, ER/OR+, HER-2 negtive    Genetic testing 4/28/2008      negative MultiSite BRACAnalysis    HTN (hypertension)       "Hyperlipidemia      Invasive lobular carcinoma of breast, stage 1 2012    Osteoarthritis                 Past Surgical History:   Procedure Laterality Date    BREAST LUMPECTOMY Left     BREAST SURGERY   2008     left lumpectomy with negative SN biopsy    BURSECTOMY OF ELBOW Left 2022     Procedure: BURSECTOMY, ELBOW;  Surgeon: La Freitas MD;  Location: [facility];  Service: Orthopedics;  Laterality: Left;    CATARACT EXTRACTION W/  INTRAOCULAR LENS IMPLANT Right      COLONOSCOPY N/A 2022     Procedure: COLONOSCOPY;  Surgeon: Arnaldo Gallagher MD;  Location: [facility] ([address] [place] [address]);  Service: Endoscopy;  Laterality: N/A;  order -new order placed-original order placed by Dr. Lucero Mccrary  Clear liquids up to 2 hrs prior/ AM prep 2am-3am - st   fully vaccinated; instructions to portal-st          ENDOMETRIAL BIOPSY   2011         Review of Systems:  Constitutional: Negative for chills and fever.   Respiratory: Negative for cough and shortness of breath.    Gastrointestinal: Negative for nausea and vomiting.   Skin: Negative for rash.   Neurological: Negative for dizziness and headaches.   Psychiatric/Behavioral: Negative for depression.   MSK as in HPI         OBJECTIVE:      PHYSICAL EXAM:  Ht 5' 3" (1.6 m)   Wt 77.6 kg (171 lb)   LMP 2008 (Approximate) Comment: 3/2008  BMI 30.29 kg/m²      GEN:  NAD, well-developed, well-groomed.  NEURO: Awake, alert, and oriented. Normal attention and concentration.    PSYCH: Normal mood and affect. Behavior is normal.  HEENT: No cervical lymphadenopathy noted.  CARDIOVASCULAR: Radial pulses 2+ bilaterally. No LE edema noted.  PULMONARY: Breath sounds normal. No respiratory distress.  SKIN: Intact, no rashes.       MSK:   LUE:  Good active ROM of the elbow wrist and fingers. Well healed prior surgical incision over the olecranon. There is a very small, mobile mass, non tender to palpation at the distal incision. " "AIN/PIN/Radial/Median/Ulnar Nerves assessed in isolation without deficit. Radial & Ulnar arteries palpated 2+. Capillary Refill <3s.        RADIOGRAPHS:  Xray left elbow 10/26/22   FINDINGS:  No acute fracture or dislocation identified.  No elbow joint effusion.   Progressive soft tissue thickening and edema along the posterior aspect of the elbow.   Impression:   Please see above.     US LUE 2/14/23   FINDINGS:  Images labeled "superior to the incision site" reveal a linear anechoic focus in the subcutaneous tissues.  Associated complex oval hypoechoic focus measures 8 x 8 x 4 mm.  No internal vascularity.  Findings are nonspecific and could represent a hematoma.  Repeat imaging recommended should the abnormality enlarge.   Impression:   Please see above.  Comments: I have personally reviewed the imaging and I agree with the above radiologist's report.     ASSESSMENT/PLAN:          ICD-10-CM ICD-9-CM   1. Mass of left upper extremity  R22.32 782.2         No orders of the defined types were placed in this encounter.     Plan:   US reviewed, treatment options discussed. Patient wishes to proceed with mass excision of the left upper extremity. Consents reviewed and signed in clinic all questions answered. RTC PO.           The patient indicates understanding of these issues and agrees to the plan.  "

## 2023-03-20 ENCOUNTER — HOSPITAL ENCOUNTER (OUTPATIENT)
Facility: HOSPITAL | Age: 68
Discharge: HOME | End: 2023-03-20
Attending: ORTHOPAEDIC SURGERY | Admitting: ORTHOPAEDIC SURGERY
Payer: MEDICARE

## 2023-03-20 ENCOUNTER — ANESTHESIA (OUTPATIENT)
Dept: SURGERY | Facility: HOSPITAL | Age: 68
End: 2023-03-20
Payer: MEDICARE

## 2023-03-20 VITALS
HEART RATE: 63 BPM | HEIGHT: 63 IN | DIASTOLIC BLOOD PRESSURE: 65 MMHG | BODY MASS INDEX: 30.3 KG/M2 | OXYGEN SATURATION: 97 % | RESPIRATION RATE: 20 BRPM | TEMPERATURE: 98 F | SYSTOLIC BLOOD PRESSURE: 144 MMHG | WEIGHT: 171 LBS

## 2023-03-20 DIAGNOSIS — R22.32 MASS OF LEFT UPPER EXTREMITY: ICD-10-CM

## 2023-03-20 DIAGNOSIS — R22.32 ELBOW MASS, LEFT: Primary | ICD-10-CM

## 2023-03-20 PROCEDURE — 24075 EXC ARM/ELBOW LES SC < 3 CM: CPT | Mod: LT,,, | Performed by: ORTHOPAEDIC SURGERY

## 2023-03-20 PROCEDURE — 25000003 PHARM REV CODE 250: Performed by: ORTHOPAEDIC SURGERY

## 2023-03-20 PROCEDURE — 99900035 HC TECH TIME PER 15 MIN (STAT)

## 2023-03-20 PROCEDURE — 88307 PR  SURG PATH,LEVEL V: ICD-10-PCS | Mod: 26,,, | Performed by: PATHOLOGY

## 2023-03-20 PROCEDURE — 36000706: Performed by: ORTHOPAEDIC SURGERY

## 2023-03-20 PROCEDURE — D9220A PRA ANESTHESIA: ICD-10-PCS | Mod: CRNA,,, | Performed by: NURSE ANESTHETIST, CERTIFIED REGISTERED

## 2023-03-20 PROCEDURE — 37000009 HC ANESTHESIA EA ADD 15 MINS: Performed by: ORTHOPAEDIC SURGERY

## 2023-03-20 PROCEDURE — D9220A PRA ANESTHESIA: ICD-10-PCS | Mod: ANES,,, | Performed by: ANESTHESIOLOGY

## 2023-03-20 PROCEDURE — 63600175 PHARM REV CODE 636 W HCPCS: Performed by: NURSE ANESTHETIST, CERTIFIED REGISTERED

## 2023-03-20 PROCEDURE — 88307 TISSUE EXAM BY PATHOLOGIST: CPT | Mod: 26,,, | Performed by: PATHOLOGY

## 2023-03-20 PROCEDURE — 71000015 HC POSTOP RECOV 1ST HR: Performed by: ORTHOPAEDIC SURGERY

## 2023-03-20 PROCEDURE — D9220A PRA ANESTHESIA: Mod: ANES,,, | Performed by: ANESTHESIOLOGY

## 2023-03-20 PROCEDURE — D9220A PRA ANESTHESIA: Mod: CRNA,,, | Performed by: NURSE ANESTHETIST, CERTIFIED REGISTERED

## 2023-03-20 PROCEDURE — 25000003 PHARM REV CODE 250: Performed by: NURSE ANESTHETIST, CERTIFIED REGISTERED

## 2023-03-20 PROCEDURE — 27201423 OPTIME MED/SURG SUP & DEVICES STERILE SUPPLY: Performed by: ORTHOPAEDIC SURGERY

## 2023-03-20 PROCEDURE — 88307 TISSUE EXAM BY PATHOLOGIST: CPT | Performed by: PATHOLOGY

## 2023-03-20 PROCEDURE — 25000003 PHARM REV CODE 250: Performed by: STUDENT IN AN ORGANIZED HEALTH CARE EDUCATION/TRAINING PROGRAM

## 2023-03-20 PROCEDURE — 63600175 PHARM REV CODE 636 W HCPCS: Performed by: STUDENT IN AN ORGANIZED HEALTH CARE EDUCATION/TRAINING PROGRAM

## 2023-03-20 PROCEDURE — 24075 PR EXC TUMOR SOFT TISS UPPER ARM/ELBOW SUBQ <3CM: ICD-10-PCS | Mod: LT,,, | Performed by: ORTHOPAEDIC SURGERY

## 2023-03-20 PROCEDURE — 71000033 HC RECOVERY, INTIAL HOUR: Performed by: ORTHOPAEDIC SURGERY

## 2023-03-20 PROCEDURE — 37000008 HC ANESTHESIA 1ST 15 MINUTES: Performed by: ORTHOPAEDIC SURGERY

## 2023-03-20 PROCEDURE — 36000707: Performed by: ORTHOPAEDIC SURGERY

## 2023-03-20 PROCEDURE — 94761 N-INVAS EAR/PLS OXIMETRY MLT: CPT

## 2023-03-20 RX ORDER — SODIUM CHLORIDE 0.9 % (FLUSH) 0.9 %
10 SYRINGE (ML) INJECTION
Status: DISCONTINUED | OUTPATIENT
Start: 2023-03-20 | End: 2023-03-20 | Stop reason: HOSPADM

## 2023-03-20 RX ORDER — ONDANSETRON 2 MG/ML
INJECTION INTRAMUSCULAR; INTRAVENOUS
Status: DISCONTINUED | OUTPATIENT
Start: 2023-03-20 | End: 2023-03-20

## 2023-03-20 RX ORDER — ONDANSETRON 4 MG/1
8 TABLET, ORALLY DISINTEGRATING ORAL EVERY 8 HOURS PRN
Status: DISCONTINUED | OUTPATIENT
Start: 2023-03-20 | End: 2023-03-20 | Stop reason: HOSPADM

## 2023-03-20 RX ORDER — HYDROMORPHONE HYDROCHLORIDE 1 MG/ML
0.2 INJECTION, SOLUTION INTRAMUSCULAR; INTRAVENOUS; SUBCUTANEOUS EVERY 5 MIN PRN
Status: DISCONTINUED | OUTPATIENT
Start: 2023-03-20 | End: 2023-03-20 | Stop reason: HOSPADM

## 2023-03-20 RX ORDER — LIDOCAINE HYDROCHLORIDE 10 MG/ML
INJECTION, SOLUTION EPIDURAL; INFILTRATION; INTRACAUDAL; PERINEURAL
Status: DISCONTINUED | OUTPATIENT
Start: 2023-03-20 | End: 2023-03-20 | Stop reason: HOSPADM

## 2023-03-20 RX ORDER — BUPIVACAINE HYDROCHLORIDE 2.5 MG/ML
INJECTION, SOLUTION EPIDURAL; INFILTRATION; INTRACAUDAL
Status: DISCONTINUED | OUTPATIENT
Start: 2023-03-20 | End: 2023-03-20 | Stop reason: HOSPADM

## 2023-03-20 RX ORDER — FENTANYL CITRATE 50 UG/ML
INJECTION, SOLUTION INTRAMUSCULAR; INTRAVENOUS
Status: DISCONTINUED | OUTPATIENT
Start: 2023-03-20 | End: 2023-03-20

## 2023-03-20 RX ORDER — PROPOFOL 10 MG/ML
VIAL (ML) INTRAVENOUS
Status: DISCONTINUED | OUTPATIENT
Start: 2023-03-20 | End: 2023-03-20

## 2023-03-20 RX ORDER — BACITRACIN ZINC 500 UNIT/G
OINTMENT (GRAM) TOPICAL
Status: DISCONTINUED | OUTPATIENT
Start: 2023-03-20 | End: 2023-03-20 | Stop reason: HOSPADM

## 2023-03-20 RX ORDER — HALOPERIDOL 5 MG/ML
0.5 INJECTION INTRAMUSCULAR EVERY 10 MIN PRN
Status: DISCONTINUED | OUTPATIENT
Start: 2023-03-20 | End: 2023-03-20 | Stop reason: HOSPADM

## 2023-03-20 RX ORDER — HYDROCODONE BITARTRATE AND ACETAMINOPHEN 5; 325 MG/1; MG/1
1 TABLET ORAL EVERY 4 HOURS PRN
Status: DISCONTINUED | OUTPATIENT
Start: 2023-03-20 | End: 2023-03-20 | Stop reason: HOSPADM

## 2023-03-20 RX ORDER — OXYCODONE HYDROCHLORIDE 5 MG/1
5 TABLET ORAL
Status: DISCONTINUED | OUTPATIENT
Start: 2023-03-20 | End: 2023-03-20 | Stop reason: HOSPADM

## 2023-03-20 RX ORDER — ACETAMINOPHEN 500 MG
1000 TABLET ORAL
Status: COMPLETED | OUTPATIENT
Start: 2023-03-20 | End: 2023-03-20

## 2023-03-20 RX ORDER — ACETAMINOPHEN 500 MG
1000 TABLET ORAL
Status: DISCONTINUED | OUTPATIENT
Start: 2023-03-21 | End: 2023-03-20

## 2023-03-20 RX ORDER — PROPOFOL 10 MG/ML
VIAL (ML) INTRAVENOUS CONTINUOUS PRN
Status: DISCONTINUED | OUTPATIENT
Start: 2023-03-20 | End: 2023-03-20

## 2023-03-20 RX ORDER — PSEUDOEPHED/CODEINE/TRIPROLIDN 30-10-1.25
1 SYRUP ORAL DAILY
COMMUNITY

## 2023-03-20 RX ORDER — MIDAZOLAM HYDROCHLORIDE 1 MG/ML
INJECTION, SOLUTION INTRAMUSCULAR; INTRAVENOUS
Status: DISCONTINUED | OUTPATIENT
Start: 2023-03-20 | End: 2023-03-20

## 2023-03-20 RX ORDER — LIDOCAINE HCL/PF 100 MG/5ML
SYRINGE (ML) INTRAVENOUS
Status: DISCONTINUED | OUTPATIENT
Start: 2023-03-20 | End: 2023-03-20

## 2023-03-20 RX ORDER — MUPIROCIN 20 MG/G
OINTMENT TOPICAL
Status: DISCONTINUED | OUTPATIENT
Start: 2023-03-20 | End: 2023-03-20 | Stop reason: HOSPADM

## 2023-03-20 RX ADMIN — SODIUM CHLORIDE: 9 INJECTION, SOLUTION INTRAVENOUS at 07:03

## 2023-03-20 RX ADMIN — CEFAZOLIN 400 MG: 2 INJECTION, POWDER, FOR SOLUTION INTRAMUSCULAR; INTRAVENOUS at 08:03

## 2023-03-20 RX ADMIN — PROPOFOL 100 MCG/KG/MIN: 10 INJECTION, EMULSION INTRAVENOUS at 08:03

## 2023-03-20 RX ADMIN — FENTANYL CITRATE 50 MCG: 50 INJECTION, SOLUTION INTRAMUSCULAR; INTRAVENOUS at 08:03

## 2023-03-20 RX ADMIN — CEFAZOLIN 500 MG: 2 INJECTION, POWDER, FOR SOLUTION INTRAMUSCULAR; INTRAVENOUS at 08:03

## 2023-03-20 RX ADMIN — PROPOFOL 20 MG: 10 INJECTION, EMULSION INTRAVENOUS at 08:03

## 2023-03-20 RX ADMIN — MUPIROCIN: 20 OINTMENT TOPICAL at 07:03

## 2023-03-20 RX ADMIN — ONDANSETRON 4 MG: 2 INJECTION INTRAMUSCULAR; INTRAVENOUS at 08:03

## 2023-03-20 RX ADMIN — MIDAZOLAM HYDROCHLORIDE 2 MG: 1 INJECTION, SOLUTION INTRAMUSCULAR; INTRAVENOUS at 08:03

## 2023-03-20 RX ADMIN — CEFAZOLIN 100 MG: 2 INJECTION, POWDER, FOR SOLUTION INTRAMUSCULAR; INTRAVENOUS at 08:03

## 2023-03-20 RX ADMIN — LIDOCAINE HYDROCHLORIDE 100 MG: 20 INJECTION, SOLUTION INTRAVENOUS at 08:03

## 2023-03-20 RX ADMIN — ACETAMINOPHEN 1000 MG: 500 TABLET ORAL at 07:03

## 2023-03-20 NOTE — OP NOTE
OPERATIVE NOTE    DATE OF PROCEDURE: 03/20/2023     PREOPERATIVE DIAGNOSIS: Elbow mass, left [R22.32]    POSTOPERATIVE DIAGNOSIS: Elbow mass, left [R22.32]    PROCEDURE PERFORMED:   Procedure(s) (LRB):  EXCISION, MASS,LEFT ELBOW (Left)  Surgeon(s) and Role:     * La Freitas MD - Primary    Surgeon: Jayjay Love MD    Assistants: Owen Vivar MD    ANESTHESIA: Local MAC    ESTIMATED BLOOD LOSS: 5 cc.     Complications: None    Specimens: None    IMPLANTS:  * No implants in log *     INDICATIONS FOR PROCEDURE: The patient is an 67 y.o. female who presented to clinic status post left elbow bursectomy with multiple mobile soft tissue masses around the olecranon. It was decided that the best plan of action was to take the patient back to the operative theatre for excisional biopsy.  This procedure, as well as, alternatives to this procedure was discussed at length with the patient. Risks and benefits were also discussed. Risks include but are not limited to bleeding, infection, numbness, scarring, damage to major neurovascular structures, need for further surgery, loss of function, myocardial infarction, deep venous thrombosis, pulmonary embolism, and death. Patient understood these well and consented for the procedure as described.    OPERATIVE PROCEDURE:  Patient met in the preoperative hold area and the correct site and side of surgery being the left upper extremity were marked and verified.  Patient brought back to the operative suite.  Mac anesthesia smoothly induced.  Patient transferred over to operative table.  Placed in supine position. All bony prominences were appropriately padded.  Patient received Ancef for preoperative antibiotics.  The operative extremity was then prepped and draped in normal sterile fashion.    Time-out was performed verifying the correct patient, site/side of surgery, surgical consent, radiographs as applicable, preop antibiotics, necessary equipment, anticipated blood  loss, length of procedure, postoperative disposition.      The previous incision was marked out and the most distal 2 cm was opened using a 15 blade.  Dissection through the underlying soft tissue was carried out with Littler scissors.  A round mobile hemosiderin colored mass was discovered and removed.  Two smaller masses of similar description also removed and sent for pathology, but these did not require further dissection has they were free within the space.    Wounds irrigated with saline.  Hemostasis achieved as needed with electrocautery.  Subcutaneous tissue closed with 4-0 Vicryl.  Skin closed with 4-0 Monocryl.  Sterile dressing applied.  Posterior slab splint applied    At the conclusion of procedure the patient had soft and compressible compartments, brisk cap refill, palpable radial pulse in the operative extremity.    Prior to final closure all counts were confirmed to be correct.  Patient tolerated the procedure well without any complications, was awoken from anesthesia, transferred PACU for further recovery.    POSTOPERATIVE PLAN:  Nonweightbearing in a long-arm splint x2 weeks   Return to clinic in 2 weeks for wound evaluation and splint removal.    Follow up pathology report

## 2023-03-20 NOTE — TRANSFER OF CARE
"Anesthesia Transfer of Care Note    Patient: Marti Coley    Procedure(s) Performed: Procedure(s) (LRB):  EXCISION, MASS,LEFT ELBOW (Left)    Patient location: PACU    Anesthesia Type: general    Transport from OR: Transported from OR on room air with adequate spontaneous ventilation    Post pain: adequate analgesia    Post assessment: no apparent anesthetic complications    Post vital signs: stable    Level of consciousness: awake    Nausea/Vomiting: no nausea/vomiting    Complications: none    Transfer of care protocol was followed      Last vitals:   Visit Vitals  BP (!) 165/81 (BP Location: Right arm, Patient Position: Lying)   Pulse 76   Temp 36.7 °C (98 °F) (Oral)   Resp 16   Ht 5' 3" (1.6 m)   Wt 77.6 kg (171 lb)   LMP 03/01/2008 (Approximate)   SpO2 98%   Breastfeeding No   BMI 30.29 kg/m²     "

## 2023-03-20 NOTE — ANESTHESIA PREPROCEDURE EVALUATION
2023  Marti Coley is a 67 y.o., female.    Patient Active Problem List   Diagnosis    Hypertension    Yeast infection of the skin    Elevated triglycerides with high cholesterol    Vitamin D deficiency    Left knee pain    Osteoarthritis    Personal history of breast cancer    Calcified granuloma of lung    History of breast cancer    Olecranon bursitis of left elbow    Decreased range of motion of left elbow    Weakness of left upper extremity    Left elbow pain     Past Surgical History:   Procedure Laterality Date    BREAST LUMPECTOMY Left     BREAST SURGERY  2008    left lumpectomy with negative SN biopsy    BURSECTOMY OF ELBOW Left 2022    Procedure: BURSECTOMY, ELBOW;  Surgeon: La Freitas MD;  [facility];  Service: Orthopedics;  Laterality: Left;    CATARACT EXTRACTION W/  INTRAOCULAR LENS IMPLANT Right     COLONOSCOPY N/A 2022    Procedure: COLONOSCOPY;  Surgeon: Arnaldo Gallagher MD;  Location: [facility] ([address] [place] [address]);  Service: Endoscopy;  Laterality: N/A;  order -new order placed-original order placed by Dr. Lucero Mccrary  Clear liquids up to 2 hrs prior/ AM prep 2am-3am - st   fully vaccinated; instructions to portal-st        ENDOMETRIAL BIOPSY  2011           Pre-op Assessment    I have reviewed the Patient Summary Reports.     I have reviewed the Nursing Notes. I have reviewed the NPO Status.      Review of Systems      Physical Exam  General: Well nourished    Airway:  Mallampati: II   Mouth Opening: Normal  TM Distance: Normal  Tongue: Normal  Neck ROM: Normal ROM        Anesthesia Plan  Type of Anesthesia, risks & benefits discussed:    Anesthesia Type: Gen ETT  Intra-op Monitoring Plan: Standard ASA Monitors  Post Op Pain Control Plan: multimodal analgesia  Induction:  IV  Airway Plan: Direct  Informed Consent:  Informed consent signed with the Patient and all parties understand the risks and agree with anesthesia plan.  All questions answered.   ASA Score: 2  Day of Surgery Review of History & Physical: H&P Update referred to the surgeon/provider.    Ready For Surgery From Anesthesia Perspective.     .

## 2023-03-20 NOTE — BRIEF OP NOTE
[facility] ([facility])  Brief Operative Note    Surgery Date: 3/20/2023     Surgeon(s) and Role:     * La Rouse MD - Primary    Assisting Surgeon: None    Pre-op Diagnosis:  Elbow mass, left [R22.32]    Post-op Diagnosis:  Post-Op Diagnosis Codes:     * Elbow mass, left [R22.32]    Procedure(s) (LRB):  EXCISION, MASS,LEFT ELBOW (Left)    Anesthesia: Local MAC    Operative Findings: See op note    Estimated Blood Loss: Minimal         Specimens:   Specimen (24h ago, onward)       Start     Ordered    03/20/23 0858  Specimen to Pathology, Surgery Orthopedics  Once        Comments: Pre-op Diagnosis: Elbow mass, left [R22.32]Procedure(s):EXCISION, MASS,LEFT ELBOW Number of specimens: 1Name of specimens: 1. MASS FROM LEFT ELBOW     References:    Click here for ordering Quick Tip   Question Answer Comment   Procedure Type: Orthopedics    Which provider would you like to cc? LA ROUSE    Release to patient Immediate        03/20/23 0858                      Discharge Note    OUTCOME: Patient tolerated treatment/procedure well without complication and is now ready for discharge.    DISPOSITION: Home or Self Care    FINAL DIAGNOSIS:  Elbow mass, left    FOLLOWUP: In clinic    DISCHARGE INSTRUCTIONS:    Discharge Procedure Orders   Diet general     Call MD for:  temperature >100.4     Call MD for:  persistent nausea and vomiting     Call MD for:  severe uncontrolled pain     Call MD for:  difficulty breathing, headache or visual disturbances     Call MD for:  redness, tenderness, or signs of infection (pain, swelling, redness, odor or green/yellow discharge around incision site)     Call MD for:  hives     Call MD for:  persistent dizziness or light-headedness     Call MD for:  extreme fatigue     No driving, operating heavy equipment or signing legal documents while taking pain medication     Keep surgical extremity elevated     Leave dressing on - Keep it clean, dry, and intact until clinic  visit     Non weight bearing

## 2023-03-22 NOTE — ANESTHESIA POSTPROCEDURE EVALUATION
Anesthesia Post Evaluation    Patient: Marti Coley    Procedure(s) Performed: Procedure(s) (LRB):  EXCISION, MASS,LEFT ELBOW (Left)    Final Anesthesia Type: general      Patient location during evaluation: PACU  Patient participation: Yes- Able to Participate  Level of consciousness: awake and alert and oriented  Pain management: adequate  Airway patency: patent    PONV status at discharge: No PONV  Anesthetic complications: no      Cardiovascular status: blood pressure returned to baseline and hemodynamically stable  Respiratory status: unassisted  Hydration status: euvolemic  Follow-up not needed.          Vitals Value Taken Time   /65 03/20/23 0947   Temp 36.6 °C (97.9 °F) 03/20/23 0950   Pulse 69 03/20/23 0959   Resp 40 03/20/23 0959   SpO2 98 % 03/20/23 0959   Vitals shown include unvalidated device data.      Event Time   Out of Recovery 09:49:00         Pain/Zackery Score: No data recorded

## 2023-03-24 LAB
FINAL PATHOLOGIC DIAGNOSIS: NORMAL
Lab: NORMAL

## 2023-04-04 ENCOUNTER — TELEPHONE (OUTPATIENT)
Dept: ORTHOPEDICS | Facility: CLINIC | Age: 68
End: 2023-04-04
Payer: MEDICARE

## 2023-04-04 NOTE — PROGRESS NOTES
"Ms. Coley is here today for a post-operative visit.  She is 15 days status post left elbow mass excision by Dr. Freitas on 3/20/23. She reports that she is doing well. No pain. She has some stiffness with full elbow ROM.  She denies fever, chills, and sweats since the time of the surgery.     She is status post left olecranon bursectomy with skin excision for wound closure by Dr. Freitas on 11/16/2022.     Physical exam:    Vitals:    04/05/23 0911   Weight: 77.6 kg (171 lb)   Height: 5' 3" (1.6 m)   PainSc: 0-No pain     Vital signs are stable, patient is afebrile.  Patient is well dressed and well groomed, no acute distress.  Alert and oriented to person, place, and time.  Post op dressing taken down.  Incision is clean, dry and intact.  There is no erythema or exudate.  There is no sign of any infection. She is NVI. Sutures removed without difficulty.  She has some stiffness with full elbow flexion and extension.    Pathology   SOFT TISSUE, LEFT ELBOW, EXCISION:   - Benign fibrous-walled pseudocyst with hemorrhage.    Assessment: status post left elbow mass excision by Dr. Freitas on 3/20/23    Plan:  Marti was seen today for post-op evaluation.    Diagnoses and all orders for this visit:    Post-operative state  -     Ambulatory referral/consult to Physical/Occupational Therapy; Future      PO instruction reviewed and provided to patient  Pathology report reviewed   Therapy ordered  RTC 4 wks           "

## 2023-04-05 ENCOUNTER — OFFICE VISIT (OUTPATIENT)
Dept: ORTHOPEDICS | Facility: CLINIC | Age: 68
End: 2023-04-05
Payer: MEDICARE

## 2023-04-05 VITALS — BODY MASS INDEX: 30.3 KG/M2 | HEIGHT: 63 IN | WEIGHT: 171 LBS

## 2023-04-05 DIAGNOSIS — Z98.890 POST-OPERATIVE STATE: Primary | ICD-10-CM

## 2023-04-05 PROCEDURE — 1157F ADVNC CARE PLAN IN RCRD: CPT | Mod: CPTII,S$GLB,, | Performed by: PHYSICIAN ASSISTANT

## 2023-04-05 PROCEDURE — 99999 PR PBB SHADOW E&M-EST. PATIENT-LVL III: CPT | Mod: PBBFAC,,, | Performed by: PHYSICIAN ASSISTANT

## 2023-04-05 PROCEDURE — 1159F MED LIST DOCD IN RCRD: CPT | Mod: CPTII,S$GLB,, | Performed by: PHYSICIAN ASSISTANT

## 2023-04-05 PROCEDURE — 1159F PR MEDICATION LIST DOCUMENTED IN MEDICAL RECORD: ICD-10-PCS | Mod: CPTII,S$GLB,, | Performed by: PHYSICIAN ASSISTANT

## 2023-04-05 PROCEDURE — 1101F PT FALLS ASSESS-DOCD LE1/YR: CPT | Mod: CPTII,S$GLB,, | Performed by: PHYSICIAN ASSISTANT

## 2023-04-05 PROCEDURE — 3288F FALL RISK ASSESSMENT DOCD: CPT | Mod: CPTII,S$GLB,, | Performed by: PHYSICIAN ASSISTANT

## 2023-04-05 PROCEDURE — 1126F PR PAIN SEVERITY QUANTIFIED, NO PAIN PRESENT: ICD-10-PCS | Mod: CPTII,S$GLB,, | Performed by: PHYSICIAN ASSISTANT

## 2023-04-05 PROCEDURE — 1157F PR ADVANCE CARE PLAN OR EQUIV PRESENT IN MEDICAL RECORD: ICD-10-PCS | Mod: CPTII,S$GLB,, | Performed by: PHYSICIAN ASSISTANT

## 2023-04-05 PROCEDURE — 99024 PR POST-OP FOLLOW-UP VISIT: ICD-10-PCS | Mod: S$GLB,,, | Performed by: PHYSICIAN ASSISTANT

## 2023-04-05 PROCEDURE — 3008F PR BODY MASS INDEX (BMI) DOCUMENTED: ICD-10-PCS | Mod: CPTII,S$GLB,, | Performed by: PHYSICIAN ASSISTANT

## 2023-04-05 PROCEDURE — 3288F PR FALLS RISK ASSESSMENT DOCUMENTED: ICD-10-PCS | Mod: CPTII,S$GLB,, | Performed by: PHYSICIAN ASSISTANT

## 2023-04-05 PROCEDURE — 1101F PR PT FALLS ASSESS DOC 0-1 FALLS W/OUT INJ PAST YR: ICD-10-PCS | Mod: CPTII,S$GLB,, | Performed by: PHYSICIAN ASSISTANT

## 2023-04-05 PROCEDURE — 99024 POSTOP FOLLOW-UP VISIT: CPT | Mod: S$GLB,,, | Performed by: PHYSICIAN ASSISTANT

## 2023-04-05 PROCEDURE — 99999 PR PBB SHADOW E&M-EST. PATIENT-LVL III: ICD-10-PCS | Mod: PBBFAC,,, | Performed by: PHYSICIAN ASSISTANT

## 2023-04-05 PROCEDURE — 3008F BODY MASS INDEX DOCD: CPT | Mod: CPTII,S$GLB,, | Performed by: PHYSICIAN ASSISTANT

## 2023-04-05 PROCEDURE — 1126F AMNT PAIN NOTED NONE PRSNT: CPT | Mod: CPTII,S$GLB,, | Performed by: PHYSICIAN ASSISTANT

## 2023-05-02 ENCOUNTER — PATIENT MESSAGE (OUTPATIENT)
Dept: ORTHOPEDICS | Facility: CLINIC | Age: 68
End: 2023-05-02
Payer: MEDICARE

## 2023-06-26 ENCOUNTER — PATIENT MESSAGE (OUTPATIENT)
Dept: INTERNAL MEDICINE | Facility: CLINIC | Age: 68
End: 2023-06-26
Payer: MEDICARE

## 2023-06-26 ENCOUNTER — PATIENT MESSAGE (OUTPATIENT)
Dept: OBSTETRICS AND GYNECOLOGY | Facility: CLINIC | Age: 68
End: 2023-06-26
Payer: MEDICARE

## 2023-06-26 DIAGNOSIS — Z00.00 ANNUAL PHYSICAL EXAM: Primary | ICD-10-CM

## 2023-06-26 DIAGNOSIS — I10 PRIMARY HYPERTENSION: ICD-10-CM

## 2023-06-26 DIAGNOSIS — R73.9 HYPERGLYCEMIA: ICD-10-CM

## 2023-06-26 DIAGNOSIS — E55.9 VITAMIN D DEFICIENCY: ICD-10-CM

## 2023-06-27 ENCOUNTER — TELEPHONE (OUTPATIENT)
Dept: OBSTETRICS AND GYNECOLOGY | Facility: CLINIC | Age: 68
End: 2023-06-27
Payer: MEDICARE

## 2023-06-27 DIAGNOSIS — Z12.31 SCREENING MAMMOGRAM, ENCOUNTER FOR: Primary | ICD-10-CM

## 2023-07-06 ENCOUNTER — TELEPHONE (OUTPATIENT)
Dept: INTERNAL MEDICINE | Facility: CLINIC | Age: 68
End: 2023-07-06
Payer: MEDICARE

## 2023-07-06 NOTE — TELEPHONE ENCOUNTER
----- Message from Susie Juárez sent at 7/6/2023 10:53 AM CDT -----  Contact: [phone] Patient  Caller is requesting an earlier appointment then we can schedule.  Caller is requesting a message be sent to the provider.  If this is for urgent care symptoms, did you offer other providers at this location, providers at other locations, or [facility]? (yes, no, n/a):    If this is for the patients physical, did you offer to schedule next available and put on wait list, or to see NP or PA for their physical?  (yes, no, n/a):    When is the next available appointment with their provider:  Nurse Visit  Reason for the appointment:  Shingles Vaccine  Patient preference of timeframe to be scheduled:  8/3/2023 - 8/3/2023 morning  Would the patient like a call back, or a response through their [facility] portal?:   Call Back Patient please. Thank you  Comments:

## 2023-08-03 ENCOUNTER — PATIENT MESSAGE (OUTPATIENT)
Dept: PHARMACY | Facility: CLINIC | Age: 68
End: 2023-08-03
Payer: MEDICARE

## 2023-08-22 ENCOUNTER — OFFICE VISIT (OUTPATIENT)
Dept: OBSTETRICS AND GYNECOLOGY | Facility: CLINIC | Age: 68
End: 2023-08-22
Attending: OBSTETRICS & GYNECOLOGY
Payer: MEDICARE

## 2023-08-22 VITALS
BODY MASS INDEX: 30.9 KG/M2 | DIASTOLIC BLOOD PRESSURE: 78 MMHG | HEIGHT: 63 IN | SYSTOLIC BLOOD PRESSURE: 136 MMHG | WEIGHT: 174.38 LBS

## 2023-08-22 DIAGNOSIS — Z01.419 ENCOUNTER FOR GYNECOLOGICAL EXAMINATION WITHOUT ABNORMAL FINDING: Primary | ICD-10-CM

## 2023-08-22 PROCEDURE — 3075F PR MOST RECENT SYSTOLIC BLOOD PRESS GE 130-139MM HG: ICD-10-PCS | Mod: HCNC,CPTII,S$GLB, | Performed by: OBSTETRICS & GYNECOLOGY

## 2023-08-22 PROCEDURE — G0101 CA SCREEN;PELVIC/BREAST EXAM: HCPCS | Mod: HCNC,S$GLB,, | Performed by: OBSTETRICS & GYNECOLOGY

## 2023-08-22 PROCEDURE — 1159F MED LIST DOCD IN RCRD: CPT | Mod: HCNC,CPTII,S$GLB, | Performed by: OBSTETRICS & GYNECOLOGY

## 2023-08-22 PROCEDURE — 3288F FALL RISK ASSESSMENT DOCD: CPT | Mod: HCNC,CPTII,S$GLB, | Performed by: OBSTETRICS & GYNECOLOGY

## 2023-08-22 PROCEDURE — 1126F PR PAIN SEVERITY QUANTIFIED, NO PAIN PRESENT: ICD-10-PCS | Mod: HCNC,CPTII,S$GLB, | Performed by: OBSTETRICS & GYNECOLOGY

## 2023-08-22 PROCEDURE — 1157F ADVNC CARE PLAN IN RCRD: CPT | Mod: HCNC,CPTII,S$GLB, | Performed by: OBSTETRICS & GYNECOLOGY

## 2023-08-22 PROCEDURE — 1157F PR ADVANCE CARE PLAN OR EQUIV PRESENT IN MEDICAL RECORD: ICD-10-PCS | Mod: HCNC,CPTII,S$GLB, | Performed by: OBSTETRICS & GYNECOLOGY

## 2023-08-22 PROCEDURE — 99999 PR PBB SHADOW E&M-EST. PATIENT-LVL III: CPT | Mod: PBBFAC,HCNC,, | Performed by: OBSTETRICS & GYNECOLOGY

## 2023-08-22 PROCEDURE — 1101F PT FALLS ASSESS-DOCD LE1/YR: CPT | Mod: HCNC,CPTII,S$GLB, | Performed by: OBSTETRICS & GYNECOLOGY

## 2023-08-22 PROCEDURE — 3078F PR MOST RECENT DIASTOLIC BLOOD PRESSURE < 80 MM HG: ICD-10-PCS | Mod: HCNC,CPTII,S$GLB, | Performed by: OBSTETRICS & GYNECOLOGY

## 2023-08-22 PROCEDURE — 3288F PR FALLS RISK ASSESSMENT DOCUMENTED: ICD-10-PCS | Mod: HCNC,CPTII,S$GLB, | Performed by: OBSTETRICS & GYNECOLOGY

## 2023-08-22 PROCEDURE — 3008F PR BODY MASS INDEX (BMI) DOCUMENTED: ICD-10-PCS | Mod: HCNC,CPTII,S$GLB, | Performed by: OBSTETRICS & GYNECOLOGY

## 2023-08-22 PROCEDURE — 99999 PR PBB SHADOW E&M-EST. PATIENT-LVL III: ICD-10-PCS | Mod: PBBFAC,HCNC,, | Performed by: OBSTETRICS & GYNECOLOGY

## 2023-08-22 PROCEDURE — 3078F DIAST BP <80 MM HG: CPT | Mod: HCNC,CPTII,S$GLB, | Performed by: OBSTETRICS & GYNECOLOGY

## 2023-08-22 PROCEDURE — G0101 PR CA SCREEN;PELVIC/BREAST EXAM: ICD-10-PCS | Mod: HCNC,S$GLB,, | Performed by: OBSTETRICS & GYNECOLOGY

## 2023-08-22 PROCEDURE — 1101F PR PT FALLS ASSESS DOC 0-1 FALLS W/OUT INJ PAST YR: ICD-10-PCS | Mod: HCNC,CPTII,S$GLB, | Performed by: OBSTETRICS & GYNECOLOGY

## 2023-08-22 PROCEDURE — 1160F RVW MEDS BY RX/DR IN RCRD: CPT | Mod: HCNC,CPTII,S$GLB, | Performed by: OBSTETRICS & GYNECOLOGY

## 2023-08-22 PROCEDURE — 3008F BODY MASS INDEX DOCD: CPT | Mod: HCNC,CPTII,S$GLB, | Performed by: OBSTETRICS & GYNECOLOGY

## 2023-08-22 PROCEDURE — 1160F PR REVIEW ALL MEDS BY PRESCRIBER/CLIN PHARMACIST DOCUMENTED: ICD-10-PCS | Mod: HCNC,CPTII,S$GLB, | Performed by: OBSTETRICS & GYNECOLOGY

## 2023-08-22 PROCEDURE — 1126F AMNT PAIN NOTED NONE PRSNT: CPT | Mod: HCNC,CPTII,S$GLB, | Performed by: OBSTETRICS & GYNECOLOGY

## 2023-08-22 PROCEDURE — 3075F SYST BP GE 130 - 139MM HG: CPT | Mod: HCNC,CPTII,S$GLB, | Performed by: OBSTETRICS & GYNECOLOGY

## 2023-08-22 PROCEDURE — 1159F PR MEDICATION LIST DOCUMENTED IN MEDICAL RECORD: ICD-10-PCS | Mod: HCNC,CPTII,S$GLB, | Performed by: OBSTETRICS & GYNECOLOGY

## 2023-08-22 NOTE — PROGRESS NOTES
Subjective:       Patient ID: Marti Coley is a 67 y.o. female.    Chief Complaint:  Annual Exam and Gynecologic Exam      History of Present Illness  Gynecologic Exam  Pertinent negatives include no abdominal pain, back pain or headaches.       Marti Coley is a 67 y.o. female  here for her annual GYN exam.    She is menopausal since about age 52 ,   denies break through bleeding.   denies vaginal itching or irritation.  Denies vaginal discharge.  She is not currently sexually active. She has had1 partner for 30 years .(SPouse was diagnosed and treated for Prostate cancer 2022, still recovering.)     History of abnormal pap: No  Last Pap: approximate date 8- and was normal(and negative for HR HPV)  Last MMG: normal-2022: BI-RADS Category:   Overall: 2 - Benign-routine follow-up in 12 months  Last Colonoscopy:  colonoscopy 1 years ago with abnormalities.(Advised to return in 3 years)  denies domestic violence. She does feel safe at home.     Past Medical History:   Diagnosis Date    Allergy     Breast cancer     left diagnosed in  - underwent chemotherapy and radiation after lumpectomy    Cancer 2008    left IDC, neoadjuvant chemo, 7mm residual IDC lumpectomy, negative Sn biopsy, adjuvant XRT and hormonal therapy with tamoxifen, ER/MN+, HER-2 negtive    Genetic testing 2008     negative MultiSite BRACAnalysis    HTN (hypertension)     Hyperlipidemia     Invasive lobular carcinoma of breast, stage 1 2012    Osteoarthritis      Past Surgical History:   Procedure Laterality Date    BREAST LUMPECTOMY Left     BREAST SURGERY  2008    left lumpectomy with negative SN biopsy    BURSECTOMY OF ELBOW Left 2022    Procedure: BURSECTOMY, ELBOW;  Surgeon: La Freitas MD;  Location: [facility];  Service: Orthopedics;  Laterality: Left;    CATARACT EXTRACTION W/  INTRAOCULAR LENS IMPLANT Right     COLONOSCOPY N/A 2022    Procedure:  COLONOSCOPY;  Surgeon: Arnaldo Gallagher MD;  Location: [facility] ENDO (4TH FLR);  Service: Endoscopy;  Laterality: N/A;  order -new order placed-original order placed by Dr. Lucero Mccrary  Clear liquids up to 2 hrs prior/ AM prep 2am-3am - st   fully vaccinated; instructions to portal-st        ENDOMETRIAL BIOPSY  2011    SURGICAL REMOVAL OF MASS OF UPPER EXTREMITY Left 3/20/2023    Procedure: EXCISION, MASS,LEFT ELBOW;  Surgeon: La Freitas MD;  Location: [facility];  Service: Orthopedics;  Laterality: Left;     Social History     Socioeconomic History    Marital status:     Number of children: 0   Occupational History    Occupation:      Employer: [employer]   Tobacco Use    Smoking status: Never    Smokeless tobacco: Never   Substance and Sexual Activity    Alcohol use: No     Alcohol/week: 0.8 standard drinks of alcohol     Types: 1 Standard drinks or equivalent per week     Comment: allergic    Drug use: No    Sexual activity: Yes     Partners: Male     Birth control/protection: Post-menopausal     Comment: teacher,   since , 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 Also walks dog.     Social Determinants of Health     Financial Resource Strain: Low Risk  (2020)    Overall Financial Resource Strain (CARDIA)     Difficulty of Paying Living Expenses: Not hard at all   Food Insecurity: No Food Insecurity (2020)    Hunger Vital Sign     Worried About Running Out of Food in the Last Year: Never true     Ran Out of Food in the Last Year: Never true   Transportation Needs: No Transportation Needs (2020)    PRAPARE - Transportation     Lack of Transportation (Medical): No     Lack of Transportation (Non-Medical): No   Physical Activity: Sufficiently Active (2020)    Exercise Vital Sign     Days of Exercise per Week: 5 days     Minutes of Exercise per Session: 50  "min   Stress: Stress Concern Present (2020)    [place] [employer] - Occupational Stress Questionnaire     Feeling of Stress : Very much   Social Connections: Unknown (2020)    Social Connection and Isolation Panel [NHANES]     Frequency of Communication with Friends and Family: Once a week     Frequency of Social Gatherings with Friends and Family: Once a week     Active Member of Clubs or Organizations: Yes     Attends Club or Organization Meetings: More than 4 times per year     Marital Status:      Family History   Problem Relation Age of Onset    Breast cancer Mother 80    Hypertension Mother     Hyperlipidemia Mother     Breast cancer Sister 32        negative genetic testing 2017, multi gene panel     Cancer Sister         thyroid and uterine cancer, breast, follicular lymphoma     Endometrial cancer Sister     Uterine cancer Sister 55        endometrial cancer, diagnosed @ time of hysterectomy for fibroids    Breast cancer Paternal Grandmother 80    Heart disease Father     Cancer Father         bladder cancer     COPD Father     Aneurysm Father     Melanoma Brother     Heart disease Brother     Cancer Brother         melanoma     Ovarian cancer Neg Hx     Diabetes Neg Hx     Stroke Neg Hx      OB History          0    Para   0    Term   0       0    AB   0    Living   0         SAB   0    IAB   0    Ectopic   0    Multiple   0    Live Births   0                 /78   Ht 5' 3" (1.6 m)   Wt 79.1 kg (174 lb 6.4 oz)   LMP 2008 (Approximate) Comment: 3/2008  BMI 30.89 kg/m²         GYN & OB History  Patient's last menstrual period was 2008 (approximate).   Date of Last Pap: 2022    OB History    Para Term  AB Living   0 0 0 0 0 0   SAB IAB Ectopic Multiple Live Births   0 0 0 0 0       Review of Systems  Review of Systems   Constitutional:  Negative for activity change, appetite change, fatigue and " unexpected weight change.   HENT: Negative.     Eyes:  Negative for visual disturbance.   Respiratory:  Negative for shortness of breath and wheezing.    Cardiovascular:  Negative for chest pain, palpitations and leg swelling.   Gastrointestinal:  Negative for abdominal pain, bloating and blood in stool.   Endocrine: Negative for diabetes and hair loss.   Genitourinary:  Positive for dyspareunia and vaginal dryness. Negative for bladder incontinence, hot flashes and postmenopausal bleeding.   Musculoskeletal:  Negative for back pain and joint swelling.   Integumentary:  Negative for acne, hair changes and nipple discharge.   Neurological:  Negative for headaches.   Hematological:  Does not bruise/bleed easily.   Psychiatric/Behavioral:  Negative for depression and sleep disturbance. The patient is not nervous/anxious.    Breast: Negative for mastodynia and nipple discharge          Objective:      Physical Exam:   Constitutional: She is oriented to person, place, and time. She appears well-developed and well-nourished.    HENT:   Head: Normocephalic and atraumatic.    Eyes: Pupils are equal, round, and reactive to light. EOM are normal.     Cardiovascular:  Normal rate and regular rhythm.             Pulmonary/Chest: Effort normal and breath sounds normal.   BREASTS:  no mass, no tenderness, no deformity and no retraction. Right breast exhibits no inverted nipple, no mass, no nipple discharge, no skin change, no tenderness, no bleeding and no swelling. Left breast exhibits no inverted nipple, no mass, no nipple discharge, no skin change, no tenderness, no bleeding and no swelling. Breasts are symmetrical.              Abdominal: Soft. Bowel sounds are normal.     Genitourinary:    Pelvic exam was performed with patient supine.      Genitourinary Comments: PELVIC: Normal external genitalia without lesions.  Normal hair distribution.  Adequate perineal body, normal urethral meatus.  Vagina  Dry and poorly rugated,  atrophic, without lesions or discharge.  Cervix pink,  Stenotic,without lesions, discharge or tenderness.  No significant cystocele or rectocele.  Bimanual exam shows uterus to be normal size, regular, mobile and nontender.  Adnexa without masses or tenderness.    RECTAL:Deferred                 Musculoskeletal: Normal range of motion and moves all extremeties.       Neurological: She is alert and oriented to person, place, and time.    Skin: Skin is warm and dry.    Psychiatric: She has a normal mood and affect.              Assessment:        1. Encounter for gynecological examination without abnormal finding                Plan:        Problem List Items Addressed This Visit    None  Visit Diagnoses       Encounter for gynecological examination without abnormal finding    -  Primary             Follow up in about 1 year (around 8/22/2024).

## 2023-08-28 ENCOUNTER — LAB VISIT (OUTPATIENT)
Dept: LAB | Facility: HOSPITAL | Age: 68
End: 2023-08-28
Attending: INTERNAL MEDICINE
Payer: MEDICARE

## 2023-08-28 DIAGNOSIS — R73.9 HYPERGLYCEMIA: ICD-10-CM

## 2023-08-28 DIAGNOSIS — I10 PRIMARY HYPERTENSION: ICD-10-CM

## 2023-08-28 DIAGNOSIS — E55.9 VITAMIN D DEFICIENCY: ICD-10-CM

## 2023-08-28 DIAGNOSIS — Z00.00 ANNUAL PHYSICAL EXAM: ICD-10-CM

## 2023-08-28 LAB
25(OH)D3+25(OH)D2 SERPL-MCNC: 32 NG/ML (ref 30–96)
ALBUMIN SERPL BCP-MCNC: 3.7 G/DL (ref 3.5–5.2)
ALP SERPL-CCNC: 71 U/L (ref 55–135)
ALT SERPL W/O P-5'-P-CCNC: 22 U/L (ref 10–44)
ANION GAP SERPL CALC-SCNC: 8 MMOL/L (ref 8–16)
AST SERPL-CCNC: 17 U/L (ref 10–40)
BASOPHILS # BLD AUTO: 0.02 K/UL (ref 0–0.2)
BASOPHILS NFR BLD: 0.4 % (ref 0–1.9)
BILIRUB SERPL-MCNC: 0.5 MG/DL (ref 0.1–1)
BUN SERPL-MCNC: 11 MG/DL (ref 8–23)
CALCIUM SERPL-MCNC: 9.2 MG/DL (ref 8.7–10.5)
CHLORIDE SERPL-SCNC: 107 MMOL/L (ref 95–110)
CHOLEST SERPL-MCNC: 229 MG/DL (ref 120–199)
CHOLEST/HDLC SERPL: 5.6 {RATIO} (ref 2–5)
CO2 SERPL-SCNC: 26 MMOL/L (ref 23–29)
CREAT SERPL-MCNC: 0.7 MG/DL (ref 0.5–1.4)
DIFFERENTIAL METHOD: ABNORMAL
EOSINOPHIL # BLD AUTO: 0.1 K/UL (ref 0–0.5)
EOSINOPHIL NFR BLD: 1.6 % (ref 0–8)
ERYTHROCYTE [DISTWIDTH] IN BLOOD BY AUTOMATED COUNT: 13.2 % (ref 11.5–14.5)
EST. GFR  (NO RACE VARIABLE): >60 ML/MIN/1.73 M^2
ESTIMATED AVG GLUCOSE: 105 MG/DL (ref 68–131)
GLUCOSE SERPL-MCNC: 91 MG/DL (ref 70–110)
HBA1C MFR BLD: 5.3 % (ref 4–5.6)
HCT VFR BLD AUTO: 42.9 % (ref 37–48.5)
HDLC SERPL-MCNC: 41 MG/DL (ref 40–75)
HDLC SERPL: 17.9 % (ref 20–50)
HGB BLD-MCNC: 13.7 G/DL (ref 12–16)
IMM GRANULOCYTES # BLD AUTO: 0.01 K/UL (ref 0–0.04)
IMM GRANULOCYTES NFR BLD AUTO: 0.2 % (ref 0–0.5)
LDLC SERPL CALC-MCNC: 157.2 MG/DL (ref 63–159)
LYMPHOCYTES # BLD AUTO: 1.7 K/UL (ref 1–4.8)
LYMPHOCYTES NFR BLD: 33.9 % (ref 18–48)
MCH RBC QN AUTO: 28 PG (ref 27–31)
MCHC RBC AUTO-ENTMCNC: 31.9 G/DL (ref 32–36)
MCV RBC AUTO: 88 FL (ref 82–98)
MONOCYTES # BLD AUTO: 0.3 K/UL (ref 0.3–1)
MONOCYTES NFR BLD: 5.9 % (ref 4–15)
NEUTROPHILS # BLD AUTO: 3 K/UL (ref 1.8–7.7)
NEUTROPHILS NFR BLD: 58 % (ref 38–73)
NONHDLC SERPL-MCNC: 188 MG/DL
NRBC BLD-RTO: 0 /100 WBC
PLATELET # BLD AUTO: 224 K/UL (ref 150–450)
PMV BLD AUTO: 10 FL (ref 9.2–12.9)
POTASSIUM SERPL-SCNC: 3.8 MMOL/L (ref 3.5–5.1)
PROT SERPL-MCNC: 6.7 G/DL (ref 6–8.4)
RBC # BLD AUTO: 4.9 M/UL (ref 4–5.4)
SODIUM SERPL-SCNC: 141 MMOL/L (ref 136–145)
TRIGL SERPL-MCNC: 154 MG/DL (ref 30–150)
TSH SERPL DL<=0.005 MIU/L-ACNC: 1.35 UIU/ML (ref 0.4–4)
WBC # BLD AUTO: 5.08 K/UL (ref 3.9–12.7)

## 2023-08-28 PROCEDURE — 83036 HEMOGLOBIN GLYCOSYLATED A1C: CPT | Mod: HCNC | Performed by: INTERNAL MEDICINE

## 2023-08-28 PROCEDURE — 84443 ASSAY THYROID STIM HORMONE: CPT | Mod: HCNC | Performed by: INTERNAL MEDICINE

## 2023-08-28 PROCEDURE — 82306 VITAMIN D 25 HYDROXY: CPT | Mod: HCNC | Performed by: INTERNAL MEDICINE

## 2023-08-28 PROCEDURE — 80061 LIPID PANEL: CPT | Mod: HCNC | Performed by: INTERNAL MEDICINE

## 2023-08-28 PROCEDURE — 80053 COMPREHEN METABOLIC PANEL: CPT | Mod: HCNC | Performed by: INTERNAL MEDICINE

## 2023-08-28 PROCEDURE — 85025 COMPLETE CBC W/AUTO DIFF WBC: CPT | Mod: HCNC | Performed by: INTERNAL MEDICINE

## 2023-08-28 PROCEDURE — 36415 COLL VENOUS BLD VENIPUNCTURE: CPT | Mod: HCNC | Performed by: INTERNAL MEDICINE

## 2023-08-31 ENCOUNTER — HOSPITAL ENCOUNTER (OUTPATIENT)
Dept: RADIOLOGY | Facility: OTHER | Age: 68
Discharge: HOME | End: 2023-08-31
Attending: OBSTETRICS & GYNECOLOGY
Payer: MEDICARE

## 2023-08-31 ENCOUNTER — OFFICE VISIT (OUTPATIENT)
Dept: INTERNAL MEDICINE | Facility: CLINIC | Age: 68
End: 2023-08-31
Payer: MEDICARE

## 2023-08-31 VITALS
HEART RATE: 83 BPM | SYSTOLIC BLOOD PRESSURE: 122 MMHG | WEIGHT: 173.31 LBS | BODY MASS INDEX: 30.71 KG/M2 | HEIGHT: 63 IN | DIASTOLIC BLOOD PRESSURE: 78 MMHG

## 2023-08-31 DIAGNOSIS — Z00.00 ANNUAL PHYSICAL EXAM: Primary | ICD-10-CM

## 2023-08-31 DIAGNOSIS — Z23 NEED FOR VACCINATION FOR STREP PNEUMONIAE: ICD-10-CM

## 2023-08-31 DIAGNOSIS — Z86.010 HX OF COLONIC POLYPS: ICD-10-CM

## 2023-08-31 DIAGNOSIS — Z12.31 SCREENING MAMMOGRAM, ENCOUNTER FOR: ICD-10-CM

## 2023-08-31 DIAGNOSIS — E78.5 HYPERLIPIDEMIA, UNSPECIFIED HYPERLIPIDEMIA TYPE: ICD-10-CM

## 2023-08-31 DIAGNOSIS — I10 PRIMARY HYPERTENSION: ICD-10-CM

## 2023-08-31 DIAGNOSIS — Z85.3 PERSONAL HISTORY OF BREAST CANCER: ICD-10-CM

## 2023-08-31 DIAGNOSIS — H18.519 FUCHS' CORNEAL DYSTROPHY, UNSPECIFIED LATERALITY: ICD-10-CM

## 2023-08-31 PROCEDURE — 1157F PR ADVANCE CARE PLAN OR EQUIV PRESENT IN MEDICAL RECORD: ICD-10-PCS | Mod: HCNC,CPTII,S$GLB, | Performed by: INTERNAL MEDICINE

## 2023-08-31 PROCEDURE — 1159F MED LIST DOCD IN RCRD: CPT | Mod: HCNC,CPTII,S$GLB, | Performed by: INTERNAL MEDICINE

## 2023-08-31 PROCEDURE — 99397 PR PREVENTIVE VISIT,EST,65 & OVER: ICD-10-PCS | Mod: HCNC,25,S$GLB, | Performed by: INTERNAL MEDICINE

## 2023-08-31 PROCEDURE — 77067 SCR MAMMO BI INCL CAD: CPT | Mod: 26,HCNC,, | Performed by: RADIOLOGY

## 2023-08-31 PROCEDURE — 3074F PR MOST RECENT SYSTOLIC BLOOD PRESSURE < 130 MM HG: ICD-10-PCS | Mod: HCNC,CPTII,S$GLB, | Performed by: INTERNAL MEDICINE

## 2023-08-31 PROCEDURE — 3008F BODY MASS INDEX DOCD: CPT | Mod: HCNC,CPTII,S$GLB, | Performed by: INTERNAL MEDICINE

## 2023-08-31 PROCEDURE — 90732 PPSV23 VACC 2 YRS+ SUBQ/IM: CPT | Mod: HCNC,S$GLB,, | Performed by: INTERNAL MEDICINE

## 2023-08-31 PROCEDURE — 1157F ADVNC CARE PLAN IN RCRD: CPT | Mod: HCNC,CPTII,S$GLB, | Performed by: INTERNAL MEDICINE

## 2023-08-31 PROCEDURE — 90732 PNEUMOCOCCAL POLYSACCHARIDE VACCINE 23-VALENT =>2YO SQ IM: ICD-10-PCS | Mod: HCNC,S$GLB,, | Performed by: INTERNAL MEDICINE

## 2023-08-31 PROCEDURE — 3044F PR MOST RECENT HEMOGLOBIN A1C LEVEL <7.0%: ICD-10-PCS | Mod: HCNC,CPTII,S$GLB, | Performed by: INTERNAL MEDICINE

## 2023-08-31 PROCEDURE — 77063 MAMMO DIGITAL SCREENING BILAT WITH TOMO: ICD-10-PCS | Mod: 26,HCNC,, | Performed by: RADIOLOGY

## 2023-08-31 PROCEDURE — 77067 SCR MAMMO BI INCL CAD: CPT | Mod: TC,HCNC

## 2023-08-31 PROCEDURE — 3074F SYST BP LT 130 MM HG: CPT | Mod: HCNC,CPTII,S$GLB, | Performed by: INTERNAL MEDICINE

## 2023-08-31 PROCEDURE — 77063 BREAST TOMOSYNTHESIS BI: CPT | Mod: 26,HCNC,, | Performed by: RADIOLOGY

## 2023-08-31 PROCEDURE — 1160F PR REVIEW ALL MEDS BY PRESCRIBER/CLIN PHARMACIST DOCUMENTED: ICD-10-PCS | Mod: HCNC,CPTII,S$GLB, | Performed by: INTERNAL MEDICINE

## 2023-08-31 PROCEDURE — 99999 PR PBB SHADOW E&M-EST. PATIENT-LVL III: CPT | Mod: PBBFAC,HCNC,, | Performed by: INTERNAL MEDICINE

## 2023-08-31 PROCEDURE — 1160F RVW MEDS BY RX/DR IN RCRD: CPT | Mod: HCNC,CPTII,S$GLB, | Performed by: INTERNAL MEDICINE

## 2023-08-31 PROCEDURE — G0009 PNEUMOCOCCAL POLYSACCHARIDE VACCINE 23-VALENT =>2YO SQ IM: ICD-10-PCS | Mod: HCNC,S$GLB,, | Performed by: INTERNAL MEDICINE

## 2023-08-31 PROCEDURE — 77067 MAMMO DIGITAL SCREENING BILAT WITH TOMO: ICD-10-PCS | Mod: 26,HCNC,, | Performed by: RADIOLOGY

## 2023-08-31 PROCEDURE — G0009 ADMIN PNEUMOCOCCAL VACCINE: HCPCS | Mod: HCNC,S$GLB,, | Performed by: INTERNAL MEDICINE

## 2023-08-31 PROCEDURE — 3044F HG A1C LEVEL LT 7.0%: CPT | Mod: HCNC,CPTII,S$GLB, | Performed by: INTERNAL MEDICINE

## 2023-08-31 PROCEDURE — 3078F DIAST BP <80 MM HG: CPT | Mod: HCNC,CPTII,S$GLB, | Performed by: INTERNAL MEDICINE

## 2023-08-31 PROCEDURE — 99397 PER PM REEVAL EST PAT 65+ YR: CPT | Mod: HCNC,25,S$GLB, | Performed by: INTERNAL MEDICINE

## 2023-08-31 PROCEDURE — 99999 PR PBB SHADOW E&M-EST. PATIENT-LVL III: ICD-10-PCS | Mod: PBBFAC,HCNC,, | Performed by: INTERNAL MEDICINE

## 2023-08-31 PROCEDURE — 1159F PR MEDICATION LIST DOCUMENTED IN MEDICAL RECORD: ICD-10-PCS | Mod: HCNC,CPTII,S$GLB, | Performed by: INTERNAL MEDICINE

## 2023-08-31 PROCEDURE — 3008F PR BODY MASS INDEX (BMI) DOCUMENTED: ICD-10-PCS | Mod: HCNC,CPTII,S$GLB, | Performed by: INTERNAL MEDICINE

## 2023-08-31 PROCEDURE — 3078F PR MOST RECENT DIASTOLIC BLOOD PRESSURE < 80 MM HG: ICD-10-PCS | Mod: HCNC,CPTII,S$GLB, | Performed by: INTERNAL MEDICINE

## 2023-08-31 RX ORDER — AMLODIPINE BESYLATE 5 MG/1
5 TABLET ORAL DAILY
Qty: 90 TABLET | Refills: 3 | Status: SHIPPED | OUTPATIENT
Start: 2023-08-31

## 2023-08-31 NOTE — PROGRESS NOTES
"Subjective:       Patient ID: Marti Coley is a 67 y.o. female.    Chief Complaint: Annual Exam  This is a 67-year-old who presents today for checkup she reports overall has been doing well she did have some issues since last year with her elbow after a fall she had a bursitis after drainage it recurred and she ultimately had the bursa removed then she developed some cystic areas below that and had those removed as well she has been doing better since that time and reports that she has tried to do some regular exercise at home she has been working on trying to continue to improve her diet and after reading and she decided she did not wish to start medications for her cholesterol.  She reports only thing occasionally she was getting some headaches she reports that she saw her gynecologist and they recommended some increased hydration which she has been doing and that seems to have helped she would take an occasional ibuprofen she has been doing better she has her mammogram and had a recent gyn appointment she updated her colonoscopy and did have some polyps  She does have some issues with her vision had 1 cataract done and has history of Fuchs corneal dystrophy she reports a cataract in seem to help her vision much she has difficulty reading so she is not done the other she does continue follow with her ophthalmologist    HPI  Review of Systems   HENT:          Headache improved with hydration    Eyes:         Vision reading issues  Fuchs dystrophy outling opthomologist    Respiratory:  Negative for shortness of breath.    Cardiovascular:  Negative for chest pain.   Musculoskeletal:         Left elbow injury had surgery        Objective:    Blood pressure 122/78, pulse 83, height 5' 3" (1.6 m), weight 78.6 kg (173 lb 4.5 oz), last menstrual period 03/01/2008.   Physical Exam  Constitutional:       General: She is not in acute distress.  HENT:      Head: Normocephalic.      Comments: Mild cerumen   Stable hair " thining      Mouth/Throat:      Pharynx: Oropharynx is clear.   Eyes:      General: No scleral icterus.  Cardiovascular:      Rate and Rhythm: Normal rate and regular rhythm.      Heart sounds: Normal heart sounds. No murmur heard.     No friction rub. No gallop.      Comments: Pt defers   Pulmonary:      Effort: Pulmonary effort is normal. No respiratory distress.      Breath sounds: Normal breath sounds.   Abdominal:      General: Bowel sounds are normal.      Palpations: Abdomen is soft. There is no mass.      Tenderness: There is no abdominal tenderness.   Musculoskeletal:      Cervical back: Neck supple.      Comments: Surgical scar left elbow    Skin:     Findings: No erythema.   Neurological:      Mental Status: She is alert.   Psychiatric:         Mood and Affect: Mood normal.         Assessment:       1. Annual physical exam    2. Need for vaccination for Strep pneumoniae    3. Primary hypertension    4. Hx of colonic polyps    5. Personal history of breast cancer    6. Hyperlipidemia, unspecified hyperlipidemia type    7. Fuchs' corneal dystrophy, unspecified laterality        Plan:       Marti was seen today for annual exam.    Diagnoses and all orders for this visit:    Annual physical exam    Need for vaccination for Strep pneumoniae  -     (In Office Administered) Pneumococcal Polysaccharide Vaccine (23 Valent) (SQ/IM)    Primary hypertension  Blood pressure has been controlled maintain current regimen she is tolerating continue home monitoring call with elevations or concerns  -     amLODIPine (NORVASC) 5 MG tablet; Take 1 tablet (5 mg total) by mouth once daily.    Hx of colonic polyps  History of she is up-to-date on her colonoscopy     Personal history of breast cancer  History of she recently saw her gyn has her mammogram scheduled    Hyperlipidemia, unspecified hyperlipidemia type  History of elevated triglycerides we did discussed statins she declines she continues to work on exercise and  her diet to avoid additional medications at this time    Patient notes history of Fuchs corneal dystrophy she continues to follow with her outlying ophthalmologist    Pneumovax today follow-up annually sooner if concern  Discussed flu shot and COVID booster

## 2023-08-31 NOTE — PROGRESS NOTES
After obtaining consent, and per orders of Dr. Mccrary, injection of Pneumococcal 23 given in right deltoid by Millicent Ordonez. Patient instructed to remain in clinic for 15 minutes afterwards, and to report any adverse reaction to staff immediately. Pt tolerated vaccine well.

## 2023-10-16 ENCOUNTER — TELEPHONE (OUTPATIENT)
Dept: ADMINISTRATIVE | Facility: CLINIC | Age: 68
End: 2023-10-16
Payer: MEDICARE

## 2023-10-16 NOTE — TELEPHONE ENCOUNTER
Called pt, informed pt I was calling to remind pt of her in office EAWV on 10/17/23; clinic location provided to patient; pt confirmed appointment

## 2023-10-17 ENCOUNTER — OFFICE VISIT (OUTPATIENT)
Dept: INTERNAL MEDICINE | Facility: CLINIC | Age: 68
End: 2023-10-17
Payer: MEDICARE

## 2023-10-17 ENCOUNTER — IMMUNIZATION (OUTPATIENT)
Dept: INTERNAL MEDICINE | Facility: CLINIC | Age: 68
End: 2023-10-17
Payer: MEDICARE

## 2023-10-17 VITALS
DIASTOLIC BLOOD PRESSURE: 70 MMHG | SYSTOLIC BLOOD PRESSURE: 122 MMHG | WEIGHT: 177.25 LBS | HEART RATE: 83 BPM | BODY MASS INDEX: 31.4 KG/M2 | OXYGEN SATURATION: 97 %

## 2023-10-17 DIAGNOSIS — E55.9 VITAMIN D DEFICIENCY: ICD-10-CM

## 2023-10-17 DIAGNOSIS — J84.10 CALCIFIED GRANULOMA OF LUNG: ICD-10-CM

## 2023-10-17 DIAGNOSIS — I10 HYPERTENSION, UNSPECIFIED TYPE: ICD-10-CM

## 2023-10-17 DIAGNOSIS — Z85.3 HISTORY OF BREAST CANCER: ICD-10-CM

## 2023-10-17 DIAGNOSIS — Z74.09 OTHER REDUCED MOBILITY: ICD-10-CM

## 2023-10-17 DIAGNOSIS — Z00.00 ENCOUNTER FOR PREVENTIVE HEALTH EXAMINATION: Primary | ICD-10-CM

## 2023-10-17 PROCEDURE — 1170F PR FUNCTIONAL STATUS ASSESSED: ICD-10-PCS | Mod: HCNC,CPTII,S$GLB, | Performed by: NURSE PRACTITIONER

## 2023-10-17 PROCEDURE — 3044F HG A1C LEVEL LT 7.0%: CPT | Mod: HCNC,CPTII,S$GLB, | Performed by: NURSE PRACTITIONER

## 2023-10-17 PROCEDURE — 1157F ADVNC CARE PLAN IN RCRD: CPT | Mod: HCNC,CPTII,S$GLB, | Performed by: NURSE PRACTITIONER

## 2023-10-17 PROCEDURE — 3074F SYST BP LT 130 MM HG: CPT | Mod: HCNC,CPTII,S$GLB, | Performed by: NURSE PRACTITIONER

## 2023-10-17 PROCEDURE — 3288F PR FALLS RISK ASSESSMENT DOCUMENTED: ICD-10-PCS | Mod: HCNC,CPTII,S$GLB, | Performed by: NURSE PRACTITIONER

## 2023-10-17 PROCEDURE — 1101F PT FALLS ASSESS-DOCD LE1/YR: CPT | Mod: HCNC,CPTII,S$GLB, | Performed by: NURSE PRACTITIONER

## 2023-10-17 PROCEDURE — 99999 PR PBB SHADOW E&M-EST. PATIENT-LVL III: ICD-10-PCS | Mod: PBBFAC,HCNC,, | Performed by: NURSE PRACTITIONER

## 2023-10-17 PROCEDURE — 3078F PR MOST RECENT DIASTOLIC BLOOD PRESSURE < 80 MM HG: ICD-10-PCS | Mod: HCNC,CPTII,S$GLB, | Performed by: NURSE PRACTITIONER

## 2023-10-17 PROCEDURE — 3288F FALL RISK ASSESSMENT DOCD: CPT | Mod: HCNC,CPTII,S$GLB, | Performed by: NURSE PRACTITIONER

## 2023-10-17 PROCEDURE — G0008 FLU VACCINE - QUADRIVALENT - ADJUVANTED: ICD-10-PCS | Mod: HCNC,S$GLB,, | Performed by: INTERNAL MEDICINE

## 2023-10-17 PROCEDURE — 1170F FXNL STATUS ASSESSED: CPT | Mod: HCNC,CPTII,S$GLB, | Performed by: NURSE PRACTITIONER

## 2023-10-17 PROCEDURE — G0008 ADMIN INFLUENZA VIRUS VAC: HCPCS | Mod: HCNC,S$GLB,, | Performed by: INTERNAL MEDICINE

## 2023-10-17 PROCEDURE — 3078F DIAST BP <80 MM HG: CPT | Mod: HCNC,CPTII,S$GLB, | Performed by: NURSE PRACTITIONER

## 2023-10-17 PROCEDURE — G0402 PR WELCOME MEDICARE PREVENTIVE VISIT NEW ENROLLEE: ICD-10-PCS | Mod: HCNC,S$GLB,, | Performed by: NURSE PRACTITIONER

## 2023-10-17 PROCEDURE — G0402 INITIAL PREVENTIVE EXAM: HCPCS | Mod: HCNC,S$GLB,, | Performed by: NURSE PRACTITIONER

## 2023-10-17 PROCEDURE — 3074F PR MOST RECENT SYSTOLIC BLOOD PRESSURE < 130 MM HG: ICD-10-PCS | Mod: HCNC,CPTII,S$GLB, | Performed by: NURSE PRACTITIONER

## 2023-10-17 PROCEDURE — 3044F PR MOST RECENT HEMOGLOBIN A1C LEVEL <7.0%: ICD-10-PCS | Mod: HCNC,CPTII,S$GLB, | Performed by: NURSE PRACTITIONER

## 2023-10-17 PROCEDURE — 99999 PR PBB SHADOW E&M-EST. PATIENT-LVL III: CPT | Mod: PBBFAC,HCNC,, | Performed by: NURSE PRACTITIONER

## 2023-10-17 PROCEDURE — 90694 FLU VACCINE - QUADRIVALENT - ADJUVANTED: ICD-10-PCS | Mod: HCNC,S$GLB,, | Performed by: INTERNAL MEDICINE

## 2023-10-17 PROCEDURE — 90694 VACC AIIV4 NO PRSRV 0.5ML IM: CPT | Mod: HCNC,S$GLB,, | Performed by: INTERNAL MEDICINE

## 2023-10-17 PROCEDURE — 1157F PR ADVANCE CARE PLAN OR EQUIV PRESENT IN MEDICAL RECORD: ICD-10-PCS | Mod: HCNC,CPTII,S$GLB, | Performed by: NURSE PRACTITIONER

## 2023-10-17 PROCEDURE — 1101F PR PT FALLS ASSESS DOC 0-1 FALLS W/OUT INJ PAST YR: ICD-10-PCS | Mod: HCNC,CPTII,S$GLB, | Performed by: NURSE PRACTITIONER

## 2023-10-17 NOTE — PROGRESS NOTES
Marti Coley presented for a  Medicare AWV and comprehensive Health Risk Assessment today. The following components were reviewed and updated:    Medical history  Family History  Social history  Allergies and Current Medications  Health Risk Assessment  Health Maintenance  Care Team         ** See Completed Assessments for Annual Wellness Visit within the encounter summary.**         The following assessments were completed:  Living Situation  CAGE  Depression Screening  Timed Get Up and Go  Whisper Test  Cognitive Function Screening  Nutrition Screening  ADL Screening  PAQ Screening          Vitals:    10/17/23 1422   BP: 122/70   Pulse: 83   SpO2: 97%   Weight: 80.4 kg (177 lb 4 oz)     Body mass index is 31.4 kg/m².  Physical Exam  Vitals and nursing note reviewed.   Constitutional:       Appearance: She is well-developed.   HENT:      Head: Normocephalic and atraumatic.      Right Ear: External ear normal.      Left Ear: External ear normal.      Nose: Nose normal.   Eyes:      Pupils: Pupils are equal, round, and reactive to light.   Cardiovascular:      Rate and Rhythm: Normal rate and regular rhythm.      Heart sounds: Normal heart sounds.   Pulmonary:      Effort: Pulmonary effort is normal.      Breath sounds: Normal breath sounds.   Abdominal:      General: Bowel sounds are normal.      Palpations: Abdomen is soft.   Musculoskeletal:         General: Normal range of motion.      Cervical back: Normal range of motion.   Skin:     General: Skin is warm and dry.   Neurological:      Mental Status: She is alert and oriented to person, place, and time.               Diagnoses and health risks identified today and associated recommendations/orders:    1. Encounter for preventive health examination  Health Maintenance updated   Records reviewed   Exam done     2. Calcified granuloma of lung  Noted on CXR 7/21/2021. Stable and chronic.     3. Hypertension, unspecified type  Stable, followed by PCP   Take  medications as prescribed.   Monitor BP at home, goal BP < or = 140/80, call office if consistently above this range.   Follow low salt DASH diet and exercise.   BMI reviewed.     4. Other reduced mobility  Stable and chronic. Followed by PCP.    5. History of breast cancer  Stable and chronic. Continue current medications. Followed by PCP.     6. Vitamin D deficiency  Stable and chronic. Continue current medications. Followed by PCP.     Counseling and Referral of Other Preventative  (Italic type indicates deductible and co-insurance are waived)    Patient Name: Marti Coley  Today's Date: 10/17/2023    Health Maintenance         Date Due Completion Date    HIV Screening Never done ---    Shingles Vaccine (2 of 2) 09/28/2023 8/3/2023    Lipid Panel 08/28/2024 8/28/2023    Pneumococcal Vaccines (Age 65+) (2 - PCV) 08/31/2024 8/31/2023    Mammogram 08/31/2024 8/31/2023    Colorectal Cancer Screening 07/22/2025 7/22/2022    Override on 8/18/2011: Done (due in 5 yrs)    DEXA Scan 11/11/2025 11/11/2022    Hemoglobin A1c (Diabetic Prevention Screening) 08/28/2026 8/28/2023    TETANUS VACCINE 06/29/2028 6/29/2018          No orders of the defined types were placed in this encounter.      Provided Marti with a 5-10 year written screening schedule and personal prevention plan. Recommendations were developed using the USPSTF age appropriate recommendations. Education, counseling, and referrals were provided as needed. After Visit Summary printed and given to patient which includes a list of additional screenings\tests needed.    Follow up in about 1 year (around 10/17/2024) for medicare annual wellness visit.    Andreina Valdez, NP      I offered to discuss advanced care planning, including how to pick a person who would make decisions for you if you were unable to make them for yourself, called a health care power of , and what kind of decisions you might make such as use of life sustaining treatments such as  ventilators and tube feeding when faced with a life limiting illness recorded on a living will that they will need to know. (How you want to be cared for as you near the end of your natural life)     X  Patient has advanced directives on file, which we reviewed, and they do not wish to make changes.  Review for Opioid Screening: Pt does not have Rx for Opioids     Review for Substance Use Disorders: Patient does not use substance

## 2023-10-17 NOTE — PATIENT INSTRUCTIONS
Counseling and Referral of Other Preventative  (Italic type indicates deductible and co-insurance are waived)    Patient Name: Marti Coley  Today's Date: 10/17/2023    Health Maintenance       Date Due Completion Date    HIV Screening Never done ---    Shingles Vaccine (2 of 2) 09/28/2023 8/3/2023    Lipid Panel 08/28/2024 8/28/2023    Pneumococcal Vaccines (Age 65+) (2 - PCV) 08/31/2024 8/31/2023    Mammogram 08/31/2024 8/31/2023    Colorectal Cancer Screening 07/22/2025 7/22/2022    Override on 8/18/2011: Done (due in 5 yrs)    DEXA Scan 11/11/2025 11/11/2022    Hemoglobin A1c (Diabetic Prevention Screening) 08/28/2026 8/28/2023    TETANUS VACCINE 06/29/2028 6/29/2018        No orders of the defined types were placed in this encounter.    The following information is provided to all patients.  This information is to help you find resources for any of the problems found today that may be affecting your health:                Living healthy guide: www.[facility].[place].gov      Understanding Diabetes: www.diabetes.org      Eating healthy: www.cdc.gov/healthyweight      CDC home safety checklist: www.cdc.gov/steadi/patient.html      Agency on Aging: www.goea.[place].[facility]      Alcoholics anonymous (AA): www.aa.org      Physical Activity: www.sulma.nih.gov/pa7gcrk      Tobacco use: www.quitwithusla.org

## 2024-01-10 ENCOUNTER — OFFICE VISIT (OUTPATIENT)
Dept: OPHTHALMOLOGY | Facility: CLINIC | Age: 69
End: 2024-01-10
Attending: OPHTHALMOLOGY
Payer: MEDICARE

## 2024-01-10 DIAGNOSIS — H18.513 FUCHS' CORNEAL DYSTROPHY OF BOTH EYES: Primary | ICD-10-CM

## 2024-01-10 DIAGNOSIS — H25.12 NUCLEAR SCLEROSIS OF LEFT EYE: ICD-10-CM

## 2024-01-10 PROCEDURE — 99204 OFFICE O/P NEW MOD 45 MIN: CPT | Mod: HCNC,S$GLB,, | Performed by: OPHTHALMOLOGY

## 2024-01-10 PROCEDURE — 99999 PR PBB SHADOW E&M-EST. PATIENT-LVL III: CPT | Mod: PBBFAC,HCNC,, | Performed by: OPHTHALMOLOGY

## 2024-01-10 PROCEDURE — 1101F PT FALLS ASSESS-DOCD LE1/YR: CPT | Mod: HCNC,CPTII,S$GLB, | Performed by: OPHTHALMOLOGY

## 2024-01-10 PROCEDURE — 1160F RVW MEDS BY RX/DR IN RCRD: CPT | Mod: HCNC,CPTII,S$GLB, | Performed by: OPHTHALMOLOGY

## 2024-01-10 PROCEDURE — 1157F ADVNC CARE PLAN IN RCRD: CPT | Mod: HCNC,CPTII,S$GLB, | Performed by: OPHTHALMOLOGY

## 2024-01-10 PROCEDURE — 1159F MED LIST DOCD IN RCRD: CPT | Mod: HCNC,CPTII,S$GLB, | Performed by: OPHTHALMOLOGY

## 2024-01-10 PROCEDURE — 3288F FALL RISK ASSESSMENT DOCD: CPT | Mod: HCNC,CPTII,S$GLB, | Performed by: OPHTHALMOLOGY

## 2024-01-10 PROCEDURE — 1126F AMNT PAIN NOTED NONE PRSNT: CPT | Mod: HCNC,CPTII,S$GLB, | Performed by: OPHTHALMOLOGY

## 2024-01-10 RX ORDER — SODIUM CHLORIDE 0.9 % (FLUSH) 0.9 %
10 SYRINGE (ML) INJECTION
Status: SHIPPED | OUTPATIENT
Start: 2024-01-10

## 2024-01-10 RX ORDER — TETRACAINE HYDROCHLORIDE 5 MG/ML
1 SOLUTION OPHTHALMIC
OUTPATIENT
Start: 2024-01-10

## 2024-01-10 RX ORDER — MOXIFLOXACIN 5 MG/ML
1 SOLUTION/ DROPS OPHTHALMIC
OUTPATIENT
Start: 2024-01-10

## 2024-01-10 NOTE — PROGRESS NOTES
HPI    Patient present today for Cornea Evaluation   Pt was referred by Nelson Acosta for cornea eval/ cataract   Pt state blurry vision OU / moring time   Glare night VA OU; Denies f/f   Last edited by Dar Pisano on 1/10/2024  9:46 AM.            Assessment /Plan     For exam results, see Encounter Report.    Fuchs' corneal dystrophy of both eyes    Nuclear sclerosis of left eye      Clinically significant corneal edema is present, which affects vision and activities of daily living. Risks, benefits, and alternatives to surgery were discussed and patient voices understanding.    DMEK right eye    Fuchs/NSC OS: Need IOL calcs...

## 2024-03-04 ENCOUNTER — OFFICE VISIT (OUTPATIENT)
Dept: ORTHOPEDICS | Facility: CLINIC | Age: 69
End: 2024-03-04
Payer: MEDICARE

## 2024-03-04 ENCOUNTER — HOSPITAL ENCOUNTER (OUTPATIENT)
Dept: RADIOLOGY | Facility: HOSPITAL | Age: 69
Discharge: HOME | End: 2024-03-04
Attending: PHYSICIAN ASSISTANT
Payer: MEDICARE

## 2024-03-04 VITALS — BODY MASS INDEX: 31.91 KG/M2 | WEIGHT: 180.13 LBS

## 2024-03-04 DIAGNOSIS — M25.562 PAIN IN BOTH KNEES, UNSPECIFIED CHRONICITY: ICD-10-CM

## 2024-03-04 DIAGNOSIS — M25.561 PAIN IN BOTH KNEES, UNSPECIFIED CHRONICITY: ICD-10-CM

## 2024-03-04 DIAGNOSIS — M17.0 PRIMARY OSTEOARTHRITIS OF BOTH KNEES: Primary | ICD-10-CM

## 2024-03-04 PROCEDURE — 3008F BODY MASS INDEX DOCD: CPT | Mod: HCNC,CPTII,S$GLB, | Performed by: PHYSICIAN ASSISTANT

## 2024-03-04 PROCEDURE — 1159F MED LIST DOCD IN RCRD: CPT | Mod: HCNC,CPTII,S$GLB, | Performed by: PHYSICIAN ASSISTANT

## 2024-03-04 PROCEDURE — 1160F RVW MEDS BY RX/DR IN RCRD: CPT | Mod: HCNC,CPTII,S$GLB, | Performed by: PHYSICIAN ASSISTANT

## 2024-03-04 PROCEDURE — 1100F PTFALLS ASSESS-DOCD GE2>/YR: CPT | Mod: HCNC,CPTII,S$GLB, | Performed by: PHYSICIAN ASSISTANT

## 2024-03-04 PROCEDURE — 99213 OFFICE O/P EST LOW 20 MIN: CPT | Mod: HCNC,25,S$GLB, | Performed by: PHYSICIAN ASSISTANT

## 2024-03-04 PROCEDURE — 20610 DRAIN/INJ JOINT/BURSA W/O US: CPT | Mod: 50,HCNC,S$GLB, | Performed by: PHYSICIAN ASSISTANT

## 2024-03-04 PROCEDURE — 73562 X-RAY EXAM OF KNEE 3: CPT | Mod: 26,50,HCNC, | Performed by: RADIOLOGY

## 2024-03-04 PROCEDURE — 3288F FALL RISK ASSESSMENT DOCD: CPT | Mod: HCNC,CPTII,S$GLB, | Performed by: PHYSICIAN ASSISTANT

## 2024-03-04 PROCEDURE — 73562 X-RAY EXAM OF KNEE 3: CPT | Mod: TC,50,HCNC

## 2024-03-04 PROCEDURE — 1157F ADVNC CARE PLAN IN RCRD: CPT | Mod: HCNC,CPTII,S$GLB, | Performed by: PHYSICIAN ASSISTANT

## 2024-03-04 PROCEDURE — 99999 PR PBB SHADOW E&M-EST. PATIENT-LVL III: CPT | Mod: PBBFAC,HCNC,, | Performed by: PHYSICIAN ASSISTANT

## 2024-03-04 PROCEDURE — 1125F AMNT PAIN NOTED PAIN PRSNT: CPT | Mod: HCNC,CPTII,S$GLB, | Performed by: PHYSICIAN ASSISTANT

## 2024-03-04 RX ORDER — TRIAMCINOLONE ACETONIDE 40 MG/ML
40 INJECTION, SUSPENSION INTRA-ARTICULAR; INTRAMUSCULAR
Status: DISCONTINUED | OUTPATIENT
Start: 2024-03-04 | End: 2024-03-04 | Stop reason: HOSPADM

## 2024-03-04 RX ORDER — LIDOCAINE HYDROCHLORIDE 10 MG/ML
2 INJECTION INFILTRATION; PERINEURAL
Status: DISCONTINUED | OUTPATIENT
Start: 2024-03-04 | End: 2024-03-04 | Stop reason: HOSPADM

## 2024-03-04 RX ADMIN — LIDOCAINE HYDROCHLORIDE 2 ML: 10 INJECTION INFILTRATION; PERINEURAL at 03:03

## 2024-03-04 RX ADMIN — TRIAMCINOLONE ACETONIDE 40 MG: 40 INJECTION, SUSPENSION INTRA-ARTICULAR; INTRAMUSCULAR at 03:03

## 2024-03-05 NOTE — PROCEDURES
Large Joint Aspiration/Injection: bilateral knee    Date/Time: 3/4/2024 3:00 PM    Performed by: Analy Bates PA-C  Authorized by: Analy Bates PA-C    Consent Done?:  Yes (Verbal)  Indications:  Pain  Timeout: prior to procedure the correct patient, procedure, and site was verified    Prep: patient was prepped and draped in usual sterile fashion    Local anesthetic:  Topical anesthetic    Details:  Needle Size:  22 G  Approach:  Anterolateral  Location:  Knee  Laterality:  Bilateral  Site:  Bilateral knee  Medications (Right):  40 mg triamcinolone acetonide 40 mg/mL; 2 mL LIDOcaine HCL 10 mg/ml (1%) 10 mg/mL (1 %)  Medications (Left):  40 mg triamcinolone acetonide 40 mg/mL; 2 mL LIDOcaine HCL 10 mg/ml (1%) 10 mg/mL (1 %)  Patient tolerance:  Patient tolerated the procedure well with no immediate complications

## 2024-03-27 ENCOUNTER — OFFICE VISIT (OUTPATIENT)
Dept: OPHTHALMOLOGY | Facility: CLINIC | Age: 69
End: 2024-03-27
Attending: OPHTHALMOLOGY
Payer: MEDICARE

## 2024-03-27 DIAGNOSIS — H25.12 NUCLEAR SCLEROSIS OF LEFT EYE: ICD-10-CM

## 2024-03-27 DIAGNOSIS — H18.513 FUCHS' CORNEAL DYSTROPHY OF BOTH EYES: Primary | ICD-10-CM

## 2024-03-27 PROCEDURE — 99999 PR PBB SHADOW E&M-EST. PATIENT-LVL III: CPT | Mod: PBBFAC,HCNC,, | Performed by: OPHTHALMOLOGY

## 2024-03-27 PROCEDURE — 1126F AMNT PAIN NOTED NONE PRSNT: CPT | Mod: HCNC,CPTII,S$GLB, | Performed by: OPHTHALMOLOGY

## 2024-03-27 PROCEDURE — 1101F PT FALLS ASSESS-DOCD LE1/YR: CPT | Mod: HCNC,CPTII,S$GLB, | Performed by: OPHTHALMOLOGY

## 2024-03-27 PROCEDURE — 1157F ADVNC CARE PLAN IN RCRD: CPT | Mod: HCNC,CPTII,S$GLB, | Performed by: OPHTHALMOLOGY

## 2024-03-27 PROCEDURE — 1159F MED LIST DOCD IN RCRD: CPT | Mod: HCNC,CPTII,S$GLB, | Performed by: OPHTHALMOLOGY

## 2024-03-27 PROCEDURE — 99214 OFFICE O/P EST MOD 30 MIN: CPT | Mod: HCNC,S$GLB,, | Performed by: OPHTHALMOLOGY

## 2024-03-27 PROCEDURE — 3288F FALL RISK ASSESSMENT DOCD: CPT | Mod: HCNC,CPTII,S$GLB, | Performed by: OPHTHALMOLOGY

## 2024-03-27 PROCEDURE — 1160F RVW MEDS BY RX/DR IN RCRD: CPT | Mod: HCNC,CPTII,S$GLB, | Performed by: OPHTHALMOLOGY

## 2024-03-27 RX ORDER — MOXIFLOXACIN 5 MG/ML
1 SOLUTION/ DROPS OPHTHALMIC 4 TIMES DAILY
Qty: 3 ML | Refills: 3 | Status: SHIPPED | OUTPATIENT
Start: 2024-03-27

## 2024-03-27 RX ORDER — PREDNISOLONE ACETATE 10 MG/ML
1 SUSPENSION/ DROPS OPHTHALMIC 4 TIMES DAILY
Qty: 10 ML | Refills: 3 | Status: SHIPPED | OUTPATIENT
Start: 2024-03-27

## 2024-03-27 NOTE — PROGRESS NOTES
HPI    Patient present todayfor IOL calcs  Pt state no change since last visit.    Last edited by Dar Pisano on 3/27/2024  2:17 PM.            Assessment /Plan     For exam results, see Encounter Report.    Fuchs' corneal dystrophy of both eyes    Nuclear sclerosis of left eye      Clinically significant corneal edema is present, which affects vision and activities of daily living. Risks, benefits, and alternatives to surgery were discussed and patient voices understanding.    DMEK right eye        Clinically significant corneal edema is present, which affects vision and activities of daily living. Concurrent visually significant nuclear sclerosis also present. Risks, benefits, and alternatives to surgery were discussed and patient voices understanding.    Phaco/DSEK left eye, IOL: diboo 16.5 when ready

## 2024-06-05 ENCOUNTER — TELEPHONE (OUTPATIENT)
Dept: OPHTHALMOLOGY | Facility: CLINIC | Age: 69
End: 2024-06-05
Payer: MEDICARE

## 2024-06-05 ENCOUNTER — PATIENT MESSAGE (OUTPATIENT)
Dept: OPHTHALMOLOGY | Facility: CLINIC | Age: 69
End: 2024-06-05
Payer: MEDICARE

## 2024-06-05 DIAGNOSIS — H18.513 FUCHS' CORNEAL DYSTROPHY OF BOTH EYES: Primary | ICD-10-CM

## 2024-06-05 NOTE — TELEPHONE ENCOUNTER
----- Message from Hortencia Carver sent at 6/4/2024  4:31 PM CDT -----  Contact: [phone]    ----- Message -----  From: Apolonia Ott  Sent: 6/4/2024   4:19 PM CDT  To: Ismael [facility]CGINN[facility] Staff    Caller is requesting to schedule their surgery.  Name of Caller:Marti  Saul Call Back Number:[phone]  Additional Information: Patient is stating she is having Sx on Thursday 6/6/2024 asking for arrival time

## 2024-06-10 ENCOUNTER — PATIENT MESSAGE (OUTPATIENT)
Dept: INTERNAL MEDICINE | Facility: CLINIC | Age: 69
End: 2024-06-10
Payer: MEDICARE

## 2024-06-12 ENCOUNTER — TELEPHONE (OUTPATIENT)
Dept: OPHTHALMOLOGY | Facility: CLINIC | Age: 69
End: 2024-06-12
Payer: MEDICARE

## 2024-06-12 NOTE — TELEPHONE ENCOUNTER
Patient given arrival time of 10:15 am on Monday June 17 . Nothing to eat or drink after 11:59 pm Start drops into the operative eye Saturday . [address] [facility]

## 2024-06-14 NOTE — PRE-PROCEDURE INSTRUCTIONS
Patient reviewed on 6/12/2024.  Okay to proceed at [facility]. The following pre-procedure instructions and arrival time have been reviewed with patient via phone and sent to patient portal for review.  Patient verbalized an understanding.  Pt to be accompanied by  day of procedure as responsible .      Dear Marti     Below you will find basic pre-procedure instructions in preparation for your procedure on 6/17/24 with Dr. Guevara     You should have received your arrival time already from Dr's office.     - Nothing to eat or drink after midnight the night before your procedure until after your procedure, except AM meds with small sips of water.     - HOLD all oral Diabetic medications night before and morning of procedure  - HOLD all Insulin morning of procedure  - HOLD all Fluid pills morning of procedure  - HOLD all non-insulin shots until after surgery (Ozempic, Mounjaro, Trulicity, Victoza, Byetta, Wegovy and Adlyxin) (7 days prior)  - HOLD all vitamins, minerals and herbal supplements morning of procedure   - TAKE all B/P meds, EXCEPT those that contain a fluid pill (ex. Lasix, Hydroclorothiazide/HCTZ, Spirnolactone)  - USE inhalers as needed and bring AM of surgery  - USE EYE DROPS as directed  -TAKE blood thinner meds AM of surgery unless otherwise instructed by your provider to not take     - Shower and wash face with antibacterial soap (ex. Dial) for 3 mins PM prior and AM of surgery  - No powder, lotions, creams, oils, gels, ointments, makeup,  or jewelry    - Wear comfortable clothing (button up shirt)     (Patient is required to have a responsible ride to transport home, ride may not leave while patient is in surgery)     -- [facility], 2nd floor Surgery Center, located   @ [address] [place] [id number]  2nd Floor Registration        If you have any questions or concerns please feel free to contact your surgeon's office.           Please reply to this  message as receipt of delivery.     Catina, LPN [facility]  Pre-Admit - Anesthesia Dept

## 2024-06-17 ENCOUNTER — HOSPITAL ENCOUNTER (OUTPATIENT)
Facility: HOSPITAL | Age: 69
Discharge: HOME | End: 2024-06-17
Attending: OPHTHALMOLOGY | Admitting: OPHTHALMOLOGY
Payer: MEDICARE

## 2024-06-17 VITALS
OXYGEN SATURATION: 96 % | RESPIRATION RATE: 20 BRPM | TEMPERATURE: 98 F | SYSTOLIC BLOOD PRESSURE: 122 MMHG | HEART RATE: 76 BPM | DIASTOLIC BLOOD PRESSURE: 72 MMHG

## 2024-06-17 DIAGNOSIS — H18.519 FUCHS' CORNEAL DYSTROPHY, UNSPECIFIED LATERALITY: Primary | ICD-10-CM

## 2024-06-17 DIAGNOSIS — H18.513 FUCHS' CORNEAL DYSTROPHY OF BOTH EYES: ICD-10-CM

## 2024-06-17 PROCEDURE — 71000015 HC POSTOP RECOV 1ST HR: Performed by: OPHTHALMOLOGY

## 2024-06-17 PROCEDURE — 99153 MOD SED SAME PHYS/QHP EA: CPT | Performed by: OPHTHALMOLOGY

## 2024-06-17 PROCEDURE — 36000707: Performed by: OPHTHALMOLOGY

## 2024-06-17 PROCEDURE — 99900035 HC TECH TIME PER 15 MIN (STAT)

## 2024-06-17 PROCEDURE — 71000016 HC POSTOP RECOV ADDL HR: Performed by: OPHTHALMOLOGY

## 2024-06-17 PROCEDURE — 65756 CORNEAL TRNSPL ENDOTHELIAL: CPT | Mod: RT,,, | Performed by: OPHTHALMOLOGY

## 2024-06-17 PROCEDURE — 36000706: Performed by: OPHTHALMOLOGY

## 2024-06-17 PROCEDURE — 65757 PREP CORNEAL ENDO ALLOGRAFT: CPT | Mod: RT,,, | Performed by: OPHTHALMOLOGY

## 2024-06-17 PROCEDURE — 27201423 OPTIME MED/SURG SUP & DEVICES STERILE SUPPLY: Performed by: OPHTHALMOLOGY

## 2024-06-17 PROCEDURE — 99152 MOD SED SAME PHYS/QHP 5/>YRS: CPT | Performed by: OPHTHALMOLOGY

## 2024-06-17 PROCEDURE — 94761 N-INVAS EAR/PLS OXIMETRY MLT: CPT

## 2024-06-17 PROCEDURE — 63600175 PHARM REV CODE 636 W HCPCS: Performed by: OPHTHALMOLOGY

## 2024-06-17 PROCEDURE — 25000003 PHARM REV CODE 250: Performed by: OPHTHALMOLOGY

## 2024-06-17 PROCEDURE — V2785 CORNEAL TISSUE PROCESSING: HCPCS | Performed by: OPHTHALMOLOGY

## 2024-06-17 DEVICE — IMPLANTABLE DEVICE: Type: IMPLANTABLE DEVICE | Site: EYE | Status: FUNCTIONAL

## 2024-06-17 RX ORDER — GENTAMICIN 40 MG/ML
INJECTION, SOLUTION INTRAMUSCULAR; INTRAVENOUS
Status: DISCONTINUED | OUTPATIENT
Start: 2024-06-17 | End: 2024-06-17 | Stop reason: HOSPADM

## 2024-06-17 RX ORDER — TETRACAINE HYDROCHLORIDE 5 MG/ML
1 SOLUTION OPHTHALMIC
Status: COMPLETED | OUTPATIENT
Start: 2024-06-17 | End: 2024-06-17

## 2024-06-17 RX ORDER — BUPIVACAINE HYDROCHLORIDE 7.5 MG/ML
INJECTION, SOLUTION EPIDURAL; RETROBULBAR
Status: DISCONTINUED | OUTPATIENT
Start: 2024-06-17 | End: 2024-06-17 | Stop reason: HOSPADM

## 2024-06-17 RX ORDER — HYDROCODONE BITARTRATE AND ACETAMINOPHEN 5; 325 MG/1; MG/1
1 TABLET ORAL EVERY 4 HOURS PRN
Status: DISCONTINUED | OUTPATIENT
Start: 2024-06-17 | End: 2024-06-17 | Stop reason: HOSPADM

## 2024-06-17 RX ORDER — ACETAMINOPHEN 325 MG/1
650 TABLET ORAL EVERY 4 HOURS PRN
Status: DISCONTINUED | OUTPATIENT
Start: 2024-06-17 | End: 2024-06-17 | Stop reason: HOSPADM

## 2024-06-17 RX ORDER — DEXAMETHASONE SODIUM PHOSPHATE 4 MG/ML
INJECTION, SOLUTION INTRA-ARTICULAR; INTRALESIONAL; INTRAMUSCULAR; INTRAVENOUS; SOFT TISSUE
Status: DISCONTINUED | OUTPATIENT
Start: 2024-06-17 | End: 2024-06-17 | Stop reason: HOSPADM

## 2024-06-17 RX ORDER — MIDAZOLAM HYDROCHLORIDE 1 MG/ML
1 INJECTION, SOLUTION INTRAMUSCULAR; INTRAVENOUS
Status: DISCONTINUED | OUTPATIENT
Start: 2024-06-17 | End: 2024-06-17 | Stop reason: HOSPADM

## 2024-06-17 RX ORDER — MOXIFLOXACIN 5 MG/ML
1 SOLUTION/ DROPS OPHTHALMIC
Status: COMPLETED | OUTPATIENT
Start: 2024-06-17 | End: 2024-06-17

## 2024-06-17 RX ORDER — FENTANYL CITRATE 50 UG/ML
25 INJECTION, SOLUTION INTRAMUSCULAR; INTRAVENOUS
Status: DISCONTINUED | OUTPATIENT
Start: 2024-06-17 | End: 2024-06-17 | Stop reason: HOSPADM

## 2024-06-17 RX ORDER — LIDOCAINE HYDROCHLORIDE 20 MG/ML
INJECTION, SOLUTION INFILTRATION; PERINEURAL
Status: DISCONTINUED | OUTPATIENT
Start: 2024-06-17 | End: 2024-06-17 | Stop reason: HOSPADM

## 2024-06-17 RX ADMIN — MIDAZOLAM HYDROCHLORIDE 2 MG: 1 INJECTION, SOLUTION INTRAMUSCULAR; INTRAVENOUS at 12:06

## 2024-06-17 RX ADMIN — TETRACAINE HYDROCHLORIDE 1 DROP: 5 SOLUTION OPHTHALMIC at 10:06

## 2024-06-17 RX ADMIN — FENTANYL CITRATE 50 MCG: 50 INJECTION, SOLUTION INTRAMUSCULAR; INTRAVENOUS at 12:06

## 2024-06-17 RX ADMIN — MOXIFLOXACIN OPHTHALMIC 1 DROP: 5 SOLUTION/ DROPS OPHTHALMIC at 10:06

## 2024-06-17 NOTE — PLAN OF CARE
Discharge instructions reviewed with pt and pt's spouse. Both v/u of all instructions. VSS. IV removed with catheter tip intact. No concerns voiced; all questions answered. Escorted patient via wheelchair to family member awaiting in private vehicle.

## 2024-06-17 NOTE — H&P
Pre-op Eye Surgery H&P     CC: Painless, progressive loss of vision  Present Illness :Corneal edema/opacity  Allergies/Current Meds: see meds  Mental Status: A&O x3  Pertinent Medical History: n/a     Physical Exam  General: NAD  HEENT: Eye white/quiet  Lungs: Adequate respirations  Heart: + pulses  Abdomen: soft  Rectal/GI/: deferred     Impression: Corneal Edema/opacity  Plan:Corneal Transplant      ASA Score: II    Mallampati Score: II    Plan for Sedation: Moderate Sedation    Patient or Family History of Anesthesia problems : No    Any changes affecting the anesthesia assessment which may have changed since the initial assessment and H and P: No  
Female

## 2024-06-17 NOTE — PLAN OF CARE
Pt in preop bay 2, VSS, meds given and IV inserted. Pt denies any open wounds on body or the use of any weight loss injections. Pt needs site marked, updated H&P, and sign consents. Otherwise ready to roll.

## 2024-06-17 NOTE — DISCHARGE INSTRUCTIONS
DSEK & DMEK POST OPERATIVE INSTRUCTIONS    Day of Surgery:    · If eye is patched leave patch in place    · Remain lying on your back, facing the ceiling, except for necessary breaks, such as meals and bathroom breaks for the remainder of the day.    · Avoid watching TV or any sudden jerking movements    · You should expect mild to moderate pain and soreness, which Ibuprofen should help to ease. If you experience severe pain or nausea, call Dr Guevara or the on-call doctor at [phone]    · Plan to see Dr Guevara tomorrow at:    [facility] [address] [place] [address]        Starting the Day After Surgery:    · Remove eye patch gently    · Resume eyedrops into the operative eye FOUR times daily. You will continue until otherwise instructed by Dr Guevara.    · DO NOT rub the eye    · Protect your eye from injury with the eye shield at night and protective sunglasses during the day.    · You may bathe/shower/wash face normally    · NO swimming or strenuous exercise for 2-4 weeks    · DO NOT apply makeup for 1 week    · Plan to see Dr Guevara in 7-10 days for post operative check.

## 2024-06-17 NOTE — DISCHARGE SUMMARY
Outcome: Successful outpatient ophthalmic surgical procedure  Preprinted Instructions given to patient.  Regular diet.  Activity: No restrictions  Meds: see Med Rec  Condition: stable  Follow up: 1 day with Dr Guevara  Disposition: Home  Diagnosis: s/p eye surgery  Date of discharge: 06/17/2024

## 2024-06-17 NOTE — OP NOTE
SURGEON:  Zonia Guevara M.D.    PREOPERATIVE DIAGNOSES:  Corneal edema    POSTOPERATIVE DIAGNOSES:    Corneal edema    PROCEDURES PERFORMED:  Descement's Membrane Endothelial Keratoplasty  right eye (DMEK) (06400)  With moderate sedation >10min (32525)    DATE OF SURGERY: 06/17/2024  ANESTHESIA:  Under my direct supervision, intravenous moderate sedation was administered during the course of this procedure, with continuous monitoring of hemodynamic parameters. Total time of sedation and amount of sedatives are documented in the nursing logs.    GRAFT SIZE:  7.5 mm    COMPLICATIONS:  None.    INDICATIONS:    The patient has a history poor vision secondary to corneal edema.  After a thorough discussion of the risks, benefits and alternatives to corneal transplantation using the DMEK technique, the patient voices understanding of the risks and benefits and wishes to proceed with surgery.    PROCEDURE IN DETAIL:    The patient was brought to the operating room in the supine position, where the eye was prepped and draped in standard sterile fashion, after having received a retrobulbar block consisting of a 50/50 mixture of lidocaine and bupivacaine under conscious sedation.  The procedure was begun by the creation of a paracentesis incision, through which viscoelastic was used to fill the anterior chamber.    Next, a 3mm temporal limbal tunnel incision was constructed.  An 8 mm vaibhav was made in the center of the cornea and then reverse Sinskey used to score and remove Descemet's membrane.  An inferior surgical PI was created with 30G needle and all remaining viscoelastics were removed from the eye.    Attention was then directed to the side table, where the previously stripped donor tissue was trephined with a [place]-Eugene trephine, and stained with trypan blue. A modified injector system was used to implant this descemet's membrane tissue into the anterior chamber, and a 10-0 nylon suture placed in the wound. The graft  was unfurled with a tapping technique, and 20% SF6 gas was used to fill the AC to the diameter of graft. The anterior chamber was reformed with BSS to a normal pressure.  The patient will remain supine in the postoperative recovery area for one hour to allow better adhesion of the graft to the posterior aspect of the cornea.  The patient  will be seen tomorrow in the eye clinic.

## 2024-06-18 ENCOUNTER — OFFICE VISIT (OUTPATIENT)
Dept: OPHTHALMOLOGY | Facility: CLINIC | Age: 69
End: 2024-06-18
Payer: MEDICARE

## 2024-06-18 DIAGNOSIS — Z94.7 STATUS POST CORNEAL TRANSPLANT: ICD-10-CM

## 2024-06-18 DIAGNOSIS — Z98.890 POST-OPERATIVE STATE: Primary | ICD-10-CM

## 2024-06-18 PROCEDURE — 99024 POSTOP FOLLOW-UP VISIT: CPT | Mod: S$GLB,,, | Performed by: OPHTHALMOLOGY

## 2024-06-18 PROCEDURE — 1126F AMNT PAIN NOTED NONE PRSNT: CPT | Mod: CPTII,S$GLB,, | Performed by: OPHTHALMOLOGY

## 2024-06-18 PROCEDURE — 1159F MED LIST DOCD IN RCRD: CPT | Mod: CPTII,S$GLB,, | Performed by: OPHTHALMOLOGY

## 2024-06-18 PROCEDURE — 99999 PR PBB SHADOW E&M-EST. PATIENT-LVL III: CPT | Mod: PBBFAC,,, | Performed by: OPHTHALMOLOGY

## 2024-06-18 PROCEDURE — 1157F ADVNC CARE PLAN IN RCRD: CPT | Mod: CPTII,S$GLB,, | Performed by: OPHTHALMOLOGY

## 2024-06-18 PROCEDURE — 1160F RVW MEDS BY RX/DR IN RCRD: CPT | Mod: CPTII,S$GLB,, | Performed by: OPHTHALMOLOGY

## 2024-06-18 NOTE — PROGRESS NOTES
HPI    Descement's Membrane Endothelial Keratoplasty  right eye (DMEK) 06/17/2024    1 day Post Op DMEK   Pt state no complaints at this time   Last edited by Dar Pisano on 6/18/2024  9:20 AM.            Assessment /Plan     For exam results, see Encounter Report.    Post-operative state    Status post corneal transplant      DMEK POD1: DMEK Graft attached. 2+ MCE. Wound stable. 60% bubble with good PI.  Supine 4-5hrs daily till Monday.

## 2024-06-22 ENCOUNTER — PATIENT MESSAGE (OUTPATIENT)
Dept: OPHTHALMOLOGY | Facility: CLINIC | Age: 69
End: 2024-06-22
Payer: MEDICARE

## 2024-07-12 ENCOUNTER — OFFICE VISIT (OUTPATIENT)
Dept: OPHTHALMOLOGY | Facility: CLINIC | Age: 69
End: 2024-07-12
Payer: MEDICARE

## 2024-07-12 DIAGNOSIS — Z94.7 STATUS POST CORNEAL TRANSPLANT: Primary | ICD-10-CM

## 2024-07-12 PROCEDURE — 1159F MED LIST DOCD IN RCRD: CPT | Mod: CPTII,S$GLB,, | Performed by: OPHTHALMOLOGY

## 2024-07-12 PROCEDURE — 1160F RVW MEDS BY RX/DR IN RCRD: CPT | Mod: CPTII,S$GLB,, | Performed by: OPHTHALMOLOGY

## 2024-07-12 PROCEDURE — 1126F AMNT PAIN NOTED NONE PRSNT: CPT | Mod: CPTII,S$GLB,, | Performed by: OPHTHALMOLOGY

## 2024-07-12 PROCEDURE — 99999 PR PBB SHADOW E&M-EST. PATIENT-LVL II: CPT | Mod: PBBFAC,,, | Performed by: OPHTHALMOLOGY

## 2024-07-12 PROCEDURE — 3288F FALL RISK ASSESSMENT DOCD: CPT | Mod: CPTII,S$GLB,, | Performed by: OPHTHALMOLOGY

## 2024-07-12 PROCEDURE — 99024 POSTOP FOLLOW-UP VISIT: CPT | Mod: S$GLB,,, | Performed by: OPHTHALMOLOGY

## 2024-07-12 PROCEDURE — 1101F PT FALLS ASSESS-DOCD LE1/YR: CPT | Mod: CPTII,S$GLB,, | Performed by: OPHTHALMOLOGY

## 2024-07-12 PROCEDURE — 1157F ADVNC CARE PLAN IN RCRD: CPT | Mod: CPTII,S$GLB,, | Performed by: OPHTHALMOLOGY

## 2024-07-12 NOTE — PROGRESS NOTES
HPI    Descement's Membrane Endothelial Keratoplasty  right eye (DMEK) 06/17/2024       1 week  Post Op DMEK   Little double VA while reading   Pt state no other complaints at this time     Last edited by Dar Pisano on 7/12/2024 10:23 AM.            Assessment /Plan     For exam results, see Encounter Report.    Status post corneal transplant      DMEK OD graft attached and clear. Signs and symptoms of graft rejection reviewed.  3 weeks po, looks great!    PF jose alfredod

## 2024-08-15 ENCOUNTER — OFFICE VISIT (OUTPATIENT)
Dept: INTERNAL MEDICINE | Facility: CLINIC | Age: 69
End: 2024-08-15
Payer: MEDICARE

## 2024-08-15 ENCOUNTER — LAB VISIT (OUTPATIENT)
Dept: LAB | Facility: HOSPITAL | Age: 69
End: 2024-08-15
Attending: INTERNAL MEDICINE
Payer: MEDICARE

## 2024-08-15 VITALS
WEIGHT: 180.75 LBS | SYSTOLIC BLOOD PRESSURE: 118 MMHG | DIASTOLIC BLOOD PRESSURE: 68 MMHG | BODY MASS INDEX: 32.02 KG/M2 | RESPIRATION RATE: 18 BRPM | HEART RATE: 76 BPM | OXYGEN SATURATION: 97 %

## 2024-08-15 DIAGNOSIS — I10 PRIMARY HYPERTENSION: ICD-10-CM

## 2024-08-15 DIAGNOSIS — R73.9 HYPERGLYCEMIA: ICD-10-CM

## 2024-08-15 DIAGNOSIS — H18.519 FUCHS' CORNEAL DYSTROPHY, UNSPECIFIED LATERALITY: ICD-10-CM

## 2024-08-15 DIAGNOSIS — E78.5 HYPERLIPIDEMIA, UNSPECIFIED HYPERLIPIDEMIA TYPE: ICD-10-CM

## 2024-08-15 DIAGNOSIS — Z94.7 STATUS POST CORNEAL TRANSPLANT: ICD-10-CM

## 2024-08-15 DIAGNOSIS — Z00.00 ANNUAL PHYSICAL EXAM: Primary | ICD-10-CM

## 2024-08-15 DIAGNOSIS — E55.9 VITAMIN D DEFICIENCY: ICD-10-CM

## 2024-08-15 DIAGNOSIS — Z00.00 ANNUAL PHYSICAL EXAM: ICD-10-CM

## 2024-08-15 DIAGNOSIS — Z85.3 HISTORY OF BREAST CANCER: ICD-10-CM

## 2024-08-15 DIAGNOSIS — Z86.010 HX OF COLONIC POLYPS: ICD-10-CM

## 2024-08-15 LAB
25(OH)D3+25(OH)D2 SERPL-MCNC: 34 NG/ML (ref 30–96)
ALBUMIN SERPL BCP-MCNC: 3.8 G/DL (ref 3.5–5.2)
ALP SERPL-CCNC: 68 U/L (ref 55–135)
ALT SERPL W/O P-5'-P-CCNC: 25 U/L (ref 10–44)
ANION GAP SERPL CALC-SCNC: 9 MMOL/L (ref 8–16)
AST SERPL-CCNC: 23 U/L (ref 10–40)
BASOPHILS # BLD AUTO: 0.04 K/UL (ref 0–0.2)
BASOPHILS NFR BLD: 0.7 % (ref 0–1.9)
BILIRUB SERPL-MCNC: 0.6 MG/DL (ref 0.1–1)
BUN SERPL-MCNC: 11 MG/DL (ref 8–23)
CALCIUM SERPL-MCNC: 9.5 MG/DL (ref 8.7–10.5)
CHLORIDE SERPL-SCNC: 108 MMOL/L (ref 95–110)
CHOLEST SERPL-MCNC: 236 MG/DL (ref 120–199)
CHOLEST/HDLC SERPL: 5.8 {RATIO} (ref 2–5)
CO2 SERPL-SCNC: 24 MMOL/L (ref 23–29)
CREAT SERPL-MCNC: 0.8 MG/DL (ref 0.5–1.4)
DIFFERENTIAL METHOD BLD: NORMAL
EOSINOPHIL # BLD AUTO: 0.1 K/UL (ref 0–0.5)
EOSINOPHIL NFR BLD: 1.8 % (ref 0–8)
ERYTHROCYTE [DISTWIDTH] IN BLOOD BY AUTOMATED COUNT: 13.1 % (ref 11.5–14.5)
EST. GFR  (NO RACE VARIABLE): >60 ML/MIN/1.73 M^2
ESTIMATED AVG GLUCOSE: 108 MG/DL (ref 68–131)
GLUCOSE SERPL-MCNC: 95 MG/DL (ref 70–110)
HBA1C MFR BLD: 5.4 % (ref 4–5.6)
HCT VFR BLD AUTO: 42.4 % (ref 37–48.5)
HDLC SERPL-MCNC: 41 MG/DL (ref 40–75)
HDLC SERPL: 17.4 % (ref 20–50)
HGB BLD-MCNC: 13.8 G/DL (ref 12–16)
IMM GRANULOCYTES # BLD AUTO: 0.02 K/UL (ref 0–0.04)
IMM GRANULOCYTES NFR BLD AUTO: 0.4 % (ref 0–0.5)
LDLC SERPL CALC-MCNC: 145.2 MG/DL (ref 63–159)
LYMPHOCYTES # BLD AUTO: 1.6 K/UL (ref 1–4.8)
LYMPHOCYTES NFR BLD: 27.6 % (ref 18–48)
MCH RBC QN AUTO: 29.4 PG (ref 27–31)
MCHC RBC AUTO-ENTMCNC: 32.5 G/DL (ref 32–36)
MCV RBC AUTO: 90 FL (ref 82–98)
MONOCYTES # BLD AUTO: 0.4 K/UL (ref 0.3–1)
MONOCYTES NFR BLD: 6.2 % (ref 4–15)
NEUTROPHILS # BLD AUTO: 3.6 K/UL (ref 1.8–7.7)
NEUTROPHILS NFR BLD: 63.3 % (ref 38–73)
NONHDLC SERPL-MCNC: 195 MG/DL
NRBC BLD-RTO: 0 /100 WBC
PLATELET # BLD AUTO: 225 K/UL (ref 150–450)
PMV BLD AUTO: 10.5 FL (ref 9.2–12.9)
POTASSIUM SERPL-SCNC: 4.1 MMOL/L (ref 3.5–5.1)
PROT SERPL-MCNC: 6.8 G/DL (ref 6–8.4)
RBC # BLD AUTO: 4.7 M/UL (ref 4–5.4)
SODIUM SERPL-SCNC: 141 MMOL/L (ref 136–145)
TRIGL SERPL-MCNC: 249 MG/DL (ref 30–150)
TSH SERPL DL<=0.005 MIU/L-ACNC: 1.79 UIU/ML (ref 0.4–4)
WBC # BLD AUTO: 5.65 K/UL (ref 3.9–12.7)

## 2024-08-15 PROCEDURE — 84443 ASSAY THYROID STIM HORMONE: CPT | Mod: HCNC | Performed by: INTERNAL MEDICINE

## 2024-08-15 PROCEDURE — 36415 COLL VENOUS BLD VENIPUNCTURE: CPT | Mod: HCNC | Performed by: INTERNAL MEDICINE

## 2024-08-15 PROCEDURE — 85025 COMPLETE CBC W/AUTO DIFF WBC: CPT | Mod: HCNC | Performed by: INTERNAL MEDICINE

## 2024-08-15 PROCEDURE — 80053 COMPREHEN METABOLIC PANEL: CPT | Mod: HCNC | Performed by: INTERNAL MEDICINE

## 2024-08-15 PROCEDURE — 99999 PR PBB SHADOW E&M-EST. PATIENT-LVL IV: CPT | Mod: PBBFAC,HCNC,, | Performed by: INTERNAL MEDICINE

## 2024-08-15 PROCEDURE — 82306 VITAMIN D 25 HYDROXY: CPT | Mod: HCNC | Performed by: INTERNAL MEDICINE

## 2024-08-15 PROCEDURE — 83036 HEMOGLOBIN GLYCOSYLATED A1C: CPT | Mod: HCNC | Performed by: INTERNAL MEDICINE

## 2024-08-15 PROCEDURE — 80061 LIPID PANEL: CPT | Mod: HCNC | Performed by: INTERNAL MEDICINE

## 2024-08-15 RX ORDER — AMLODIPINE BESYLATE 5 MG/1
5 TABLET ORAL DAILY
Qty: 90 TABLET | Refills: 3 | Status: SHIPPED | OUTPATIENT
Start: 2024-08-15

## 2024-08-15 NOTE — PROGRESS NOTES
Subjective:       Patient ID: Marti Coley is a 68 y.o. female.    Chief Complaint: Annual Exam  A 68-year-old who presents today for physical patient reports that she has been doing well overall continues to have issues with her vision and Fuchs dystrophy for which she underwent right corneal transplant and cataract surgery she reports that she is planning on doing the other eye as well in the future she remains on steroid drops since then and continues to follow with her eye doctor does feel her vision has gotten somewhat better since that time . She does stay active and exercises at home regularly she tries to eat healthy as well. She remains on amlodipine and bp has been controlled continues to tolerate     HPI  Review of Systems   HENT:          Corneal tranplant right   Vision changes       Objective:    Blood pressure 118/68, pulse 76, resp. rate 18, weight 82 kg (180 lb 12.4 oz), last menstrual period 03/01/2008, SpO2 97%.   Physical Exam  Constitutional:       General: She is not in acute distress.  HENT:      Head: Normocephalic.      Comments: Hair thinning         Mouth/Throat:      Pharynx: Oropharynx is clear.   Cardiovascular:      Rate and Rhythm: Normal rate and regular rhythm.      Heart sounds: Normal heart sounds. No murmur heard.     No friction rub. No gallop.      Comments: Surgical scar left   Pulmonary:      Effort: Pulmonary effort is normal. No respiratory distress.      Breath sounds: Normal breath sounds.   Abdominal:      General: Bowel sounds are normal.      Palpations: Abdomen is soft. There is no mass.      Tenderness: There is no abdominal tenderness.   Skin:     Findings: No erythema.   Neurological:      Mental Status: She is alert.   Psychiatric:         Mood and Affect: Mood normal.         Assessment:       1. Annual physical exam    2. Primary hypertension    3. History of breast cancer    4. Hyperlipidemia, unspecified hyperlipidemia type    5. Vitamin D deficiency    6.  Hyperglycemia    7. Fuchs' corneal dystrophy, unspecified laterality        Plan:       Marti was seen today for annual exam.    Diagnoses and all orders for this visit:    Annual physical exam  -     CBC Auto Differential; Future  -     Comprehensive Metabolic Panel; Future  -     Hemoglobin A1C; Future  -     Lipid Panel; Future  -     TSH; Future  -     Vitamin D; Future    Primary hypertension  Blood pressure acceptable well controlled with amlodipine will maintain current regimen  -     CBC Auto Differential; Future  -     Comprehensive Metabolic Panel; Future  -     Hemoglobin A1C; Future  -     Lipid Panel; Future  -     TSH; Future  -     Vitamin D; Future  -     amLODIPine (NORVASC) 5 MG tablet; Take 1 tablet (5 mg total) by mouth once daily.    History of breast cancer  Mammogram scheduled    Hyperlipidemia, unspecified hyperlipidemia type  History of she is working on exercise dietary measures have discussed medications will reassess and see where she is  -     CBC Auto Differential; Future  -     Comprehensive Metabolic Panel; Future  -     Hemoglobin A1C; Future  -     Lipid Panel; Future  -     TSH; Future  -     Vitamin D; Future    Vitamin D deficiency  -     Vitamin D; Future    History of colon polyp she is up-to-date on her colonoscopy    Hyperglycemia  History of update A1c continue dietary measures  -     Hemoglobin A1C; Future    Fuchs' corneal dystrophy, unspecified laterality  With vision changes she has undergone cataract surgery and right corneal transplant continues to follow with her ophthalmologist as planning left in the future    Labs and review follow-up annually sooner if concern discussed flu and COVID shot and rsv

## 2024-08-19 DIAGNOSIS — E78.5 HYPERLIPIDEMIA, UNSPECIFIED HYPERLIPIDEMIA TYPE: Primary | ICD-10-CM

## 2024-08-19 RX ORDER — ROSUVASTATIN CALCIUM 5 MG/1
5 TABLET, COATED ORAL DAILY
Qty: 90 TABLET | Refills: 3 | Status: SHIPPED | OUTPATIENT
Start: 2024-08-19 | End: 2025-08-19

## 2024-08-20 ENCOUNTER — TELEPHONE (OUTPATIENT)
Dept: INTERNAL MEDICINE | Facility: CLINIC | Age: 69
End: 2024-08-20
Payer: MEDICARE

## 2024-08-20 NOTE — TELEPHONE ENCOUNTER
----- Message from Lucero Mccrary MD sent at 8/19/2024  8:17 AM CDT -----  Called and reviewed with pt   Discussed she would like to consider starting statin  Rx sent rosuvastatin 5 mg plan repeat labs in 10-12 weeks    Estephania repeat lipids and hpatic function labs early November

## 2024-08-22 ENCOUNTER — PATIENT MESSAGE (OUTPATIENT)
Dept: INTERNAL MEDICINE | Facility: CLINIC | Age: 69
End: 2024-08-22
Payer: MEDICARE

## 2024-09-03 ENCOUNTER — HOSPITAL ENCOUNTER (OUTPATIENT)
Dept: RADIOLOGY | Facility: OTHER | Age: 69
Discharge: HOME | End: 2024-09-03
Attending: INTERNAL MEDICINE
Payer: MEDICARE

## 2024-09-03 DIAGNOSIS — Z12.31 ENCOUNTER FOR SCREENING MAMMOGRAM FOR BREAST CANCER: ICD-10-CM

## 2024-09-03 PROCEDURE — 77067 SCR MAMMO BI INCL CAD: CPT | Mod: TC,HCNC

## 2024-10-04 ENCOUNTER — PATIENT MESSAGE (OUTPATIENT)
Dept: OPHTHALMOLOGY | Facility: CLINIC | Age: 69
End: 2024-10-04
Payer: MEDICARE

## 2024-10-08 ENCOUNTER — OFFICE VISIT (OUTPATIENT)
Dept: OBSTETRICS AND GYNECOLOGY | Facility: CLINIC | Age: 69
End: 2024-10-08
Attending: OBSTETRICS & GYNECOLOGY
Payer: MEDICARE

## 2024-10-08 VITALS
BODY MASS INDEX: 31.83 KG/M2 | SYSTOLIC BLOOD PRESSURE: 134 MMHG | DIASTOLIC BLOOD PRESSURE: 84 MMHG | HEIGHT: 63 IN | WEIGHT: 179.63 LBS

## 2024-10-08 DIAGNOSIS — Z78.0 MENOPAUSE PRESENT: ICD-10-CM

## 2024-10-08 DIAGNOSIS — Z01.419 ENCOUNTER FOR GYNECOLOGICAL EXAMINATION WITHOUT ABNORMAL FINDING: Primary | ICD-10-CM

## 2024-10-08 DIAGNOSIS — Z85.3 PERSONAL HISTORY OF BREAST CANCER: ICD-10-CM

## 2024-10-08 PROCEDURE — 3044F HG A1C LEVEL LT 7.0%: CPT | Mod: HCNC,CPTII,S$GLB, | Performed by: OBSTETRICS & GYNECOLOGY

## 2024-10-08 PROCEDURE — 3075F SYST BP GE 130 - 139MM HG: CPT | Mod: HCNC,CPTII,S$GLB, | Performed by: OBSTETRICS & GYNECOLOGY

## 2024-10-08 PROCEDURE — 99999 PR PBB SHADOW E&M-EST. PATIENT-LVL III: CPT | Mod: PBBFAC,HCNC,, | Performed by: OBSTETRICS & GYNECOLOGY

## 2024-10-08 PROCEDURE — 1101F PT FALLS ASSESS-DOCD LE1/YR: CPT | Mod: HCNC,CPTII,S$GLB, | Performed by: OBSTETRICS & GYNECOLOGY

## 2024-10-08 PROCEDURE — 1160F RVW MEDS BY RX/DR IN RCRD: CPT | Mod: HCNC,CPTII,S$GLB, | Performed by: OBSTETRICS & GYNECOLOGY

## 2024-10-08 PROCEDURE — 1126F AMNT PAIN NOTED NONE PRSNT: CPT | Mod: HCNC,CPTII,S$GLB, | Performed by: OBSTETRICS & GYNECOLOGY

## 2024-10-08 PROCEDURE — 3079F DIAST BP 80-89 MM HG: CPT | Mod: HCNC,CPTII,S$GLB, | Performed by: OBSTETRICS & GYNECOLOGY

## 2024-10-08 PROCEDURE — 1159F MED LIST DOCD IN RCRD: CPT | Mod: HCNC,CPTII,S$GLB, | Performed by: OBSTETRICS & GYNECOLOGY

## 2024-10-08 PROCEDURE — 3008F BODY MASS INDEX DOCD: CPT | Mod: HCNC,CPTII,S$GLB, | Performed by: OBSTETRICS & GYNECOLOGY

## 2024-10-08 PROCEDURE — G0101 CA SCREEN;PELVIC/BREAST EXAM: HCPCS | Mod: HCNC,GZ,S$GLB, | Performed by: OBSTETRICS & GYNECOLOGY

## 2024-10-08 PROCEDURE — 3288F FALL RISK ASSESSMENT DOCD: CPT | Mod: HCNC,CPTII,S$GLB, | Performed by: OBSTETRICS & GYNECOLOGY

## 2024-10-08 PROCEDURE — 1157F ADVNC CARE PLAN IN RCRD: CPT | Mod: HCNC,CPTII,S$GLB, | Performed by: OBSTETRICS & GYNECOLOGY

## 2024-10-08 NOTE — PROGRESS NOTES
Subjective:       Patient ID: Marti Coley is a 68 y.o. female.    Chief Complaint:  Well Woman and Gynecologic Exam      History of Present Illness  Gynecologic Exam  Pertinent negatives include no abdominal pain, back pain or headaches.       Marti Coley is a 68 y.o. female  here for her annual GYN exam.  She walks daily and does an exercise routine for cardio and balance 5 days/week. She recently in  had a corneal transplant, and is scheduled to have the contralateral done soon.  She is menopausal since age 52.   denies break through bleeding.   denies vaginal itching or irritation.  Denies vaginal discharge.  She is not currently sexually active. She has had1 partner for 31 years .(Spouse is still in recovery with Prostate cancer)     History of abnormal pap: No  Last Pap: approximate date 8- and was normal(neg HR HPV)  Last MM-3-2024:BI-RADS Category 1: Negative :   Last Dexa: 2022: 1. Normal bone mineral density of the lumbar spine.2. Mild osteopenia of the left femoral neck.  Last Colonoscopy:  colonoscopy 2 years ago with abnormalities.  denies domestic violence. She does feel safe at home.     Past Medical History:   Diagnosis Date    Allergy     Breast cancer     left diagnosed in  - underwent chemotherapy and radiation after lumpectomy    Cancer 2008    left IDC, neoadjuvant chemo, 7mm residual IDC lumpectomy, negative Sn biopsy, adjuvant XRT and hormonal therapy with tamoxifen, ER/RI+, HER-2 negtive    Genetic testing 2008     negative MultiSite BRACAnalysis    HTN (hypertension)     Hyperlipidemia     Invasive lobular carcinoma of breast, stage 1 2012    Osteoarthritis      Past Surgical History:   Procedure Laterality Date    BREAST LUMPECTOMY Left     BREAST SURGERY  2008    left lumpectomy with negative SN biopsy    BURSECTOMY OF ELBOW Left 2022    Procedure: BURSECTOMY, ELBOW;  Surgeon: La Freitas MD;  Location: [facility]  OR;  Service: Orthopedics;  Laterality: Left;    CATARACT EXTRACTION W/  INTRAOCULAR LENS IMPLANT Right     COLONOSCOPY N/A 2022    Procedure: COLONOSCOPY;  Surgeon: Arnaldo Gallagher MD;  Location: [address]);  Service: Endoscopy;  Laterality: N/A;  order -new order placed-original order placed by Dr. Lucero Mccrary  Clear liquids up to 2 hrs prior/ AM prep 2am-3am - st   fully vaccinated; instructions to portal-st        CORNEAL TRANSPLANT Right 2024    DESCEMET'S MEMBRANE ENDOTHELIAL KERATOPLASTY (DMEK) Right 2024    Procedure: DESCEMET'S MEMBRANE ENDOTHELIAL KERATOPLASTY (DMEK);  Surgeon: Zonia Guevara MD;  Location: [facility] OR;  Service: Ophthalmology;  Laterality: Right;    DESCEMET'S MEMBRANE ENDOTHELIAL KERATOPLASTY (DMEK) Right     ENDOMETRIAL BIOPSY  2011    SURGICAL REMOVAL OF MASS OF UPPER EXTREMITY Left 2023    Procedure: EXCISION, MASS,LEFT ELBOW;  Surgeon: La Freitas MD;  Location: [facility];  Service: Orthopedics;  Laterality: Left;     Social History     Socioeconomic History    Marital status:     Number of children: 0   Occupational History    Occupation:      Employer: [employer]   Tobacco Use    Smoking status: Never    Smokeless tobacco: Never   Substance and Sexual Activity    Alcohol use: No     Alcohol/week: 0.8 standard drinks of alcohol     Types: 1 Standard drinks or equivalent per week     Comment: allergic    Drug use: No    Sexual activity: Not Currently     Partners: Male     Birth control/protection: Post-menopausal     Comment: teacher,   since , 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 Also walks dog.     Social Drivers of Health     Financial Resource Strain: Low Risk  (2020)    Overall Financial Resource Strain (CARDIA)     Difficulty of Paying Living Expenses: Not hard at all   Food Insecurity: No Food Insecurity  "(2020)    Hunger Vital Sign     Worried About Running Out of Food in the Last Year: Never true     Ran Out of Food in the Last Year: Never true   Transportation Needs: No Transportation Needs (2020)    PRAPARE - Transportation     Lack of Transportation (Medical): No     Lack of Transportation (Non-Medical): No   Physical Activity: Sufficiently Active (2020)    Exercise Vital Sign     Days of Exercise per Week: 5 days     Minutes of Exercise per Session: 50 min   Stress: Stress Concern Present (2020)    [employer] [place] [employer] - Occupational Stress Questionnaire     Feeling of Stress : Very much     Family History   Problem Relation Name Age of Onset    Breast cancer Mother  80    Hypertension Mother      Hyperlipidemia Mother      Breast cancer Sister  32        negative genetic testing 2017, multi gene panel     Cancer Sister          thyroid and uterine cancer, breast, follicular lymphoma     Endometrial cancer Sister      Uterine cancer Sister  55        endometrial cancer, diagnosed @ time of hysterectomy for fibroids    Breast cancer Paternal Grandmother  80    Heart disease Father      Cancer Father          bladder cancer     COPD Father      Aneurysm Father      Melanoma Brother      Heart disease Brother      Cancer Brother          melanoma     Ovarian cancer Neg Hx      Diabetes Neg Hx      Stroke Neg Hx       OB History          0    Para   0    Term   0       0    AB   0    Living   0         SAB   0    IAB   0    Ectopic   0    Multiple   0    Live Births   0                 /84 (Patient Position: Sitting)   Ht 5' 3" (1.6 m)   Wt 81.5 kg (179 lb 9.6 oz)   LMP 2008 (Approximate) Comment: 3/2008  BMI 31.81 kg/m²         GYN & OB History  Patient's last menstrual period was 2008 (approximate).   Date of Last Pap: 2022    OB History    Para Term  AB Living   0 0 0 0 0 0   SAB IAB Ectopic Multiple Live Births   0 0 0 0 " 0       Review of Systems  Review of Systems   Constitutional:  Negative for activity change, appetite change, fatigue and unexpected weight change.   HENT: Negative.     Eyes:  Negative for visual disturbance.   Respiratory:  Negative for shortness of breath and wheezing.    Cardiovascular:  Negative for chest pain, palpitations and leg swelling.   Gastrointestinal:  Negative for abdominal pain, bloating and blood in stool.   Endocrine: Negative for diabetes.   Genitourinary:  Positive for decreased libido and vaginal dryness. Negative for hot flashes and postmenopausal bleeding.   Musculoskeletal:  Negative for back pain and joint swelling.   Integumentary:  Negative for acne and nipple discharge.   Neurological:  Negative for headaches.   Hematological:  Does not bruise/bleed easily.   Psychiatric/Behavioral:  Positive for sleep disturbance. Negative for depression. The patient is not nervous/anxious.    Breast: Negative for mastodynia and nipple discharge          Objective:      Physical Exam:   Constitutional: She is oriented to person, place, and time. She appears well-developed and well-nourished.    HENT:   Head: Normocephalic and atraumatic.    Eyes: Pupils are equal, round, and reactive to light. EOM are normal.     Cardiovascular:  Normal rate and regular rhythm.             Pulmonary/Chest: Effort normal and breath sounds normal.   BREASTS:  Right breast exhibits no inverted nipple, no mass, no nipple discharge, no skin change, no tenderness, no bleeding and no swelling. Left breast exhibits no inverted nipple, no mass, no nipple discharge, no skin change, no tenderness, no bleeding and no swelling. Slight scarring Left breast.               Abdominal: Soft. Bowel sounds are normal.     Genitourinary:    Pelvic exam was performed with patient supine.      Genitourinary Comments: PELVIC: Normal external genitalia without lesions.  Normal hair distribution.  Adequate perineal body, normal urethral meatus.   Vagina  Dry and poorly rugated, atrophic, without lesions or discharge.  Cervix pink, Nulliparous, stenotic,without lesions, discharge or tenderness.  No significant cystocele or rectocele.  Bimanual exam shows uterus to be normal size, regular, mobile and nontender.  Adnexa without masses or tenderness.    RECTAL:Deferred                 Musculoskeletal: Normal range of motion and moves all extremeties.       Neurological: She is alert and oriented to person, place, and time.    Skin: Skin is warm and dry.    Psychiatric: She has a normal mood and affect.              Assessment:        1. Encounter for gynecological examination without abnormal finding    2. Menopause present    3. Personal history of breast cancer                Plan:        Problem List Items Addressed This Visit       Personal history of breast cancer     Other Visit Diagnoses       Encounter for gynecological examination without abnormal finding    -  Primary    Menopause present                 Follow up in about 1 year (around 10/8/2025).

## 2024-10-11 ENCOUNTER — OFFICE VISIT (OUTPATIENT)
Dept: OPHTHALMOLOGY | Facility: CLINIC | Age: 69
End: 2024-10-11
Payer: MEDICARE

## 2024-10-11 DIAGNOSIS — H25.12 NUCLEAR SCLEROSIS OF LEFT EYE: ICD-10-CM

## 2024-10-11 DIAGNOSIS — Z94.7 STATUS POST CORNEAL TRANSPLANT: Primary | ICD-10-CM

## 2024-10-11 DIAGNOSIS — H18.513 FUCHS' CORNEAL DYSTROPHY OF BOTH EYES: ICD-10-CM

## 2024-10-11 PROCEDURE — 99999 PR PBB SHADOW E&M-EST. PATIENT-LVL III: CPT | Mod: PBBFAC,,, | Performed by: OPHTHALMOLOGY

## 2024-10-11 RX ORDER — PHENYLEPHRINE HYDROCHLORIDE 25 MG/ML
1 SOLUTION/ DROPS OPHTHALMIC
OUTPATIENT
Start: 2024-10-11

## 2024-10-11 RX ORDER — SODIUM CHLORIDE 0.9 % (FLUSH) 0.9 %
10 SYRINGE (ML) INJECTION
OUTPATIENT
Start: 2024-10-11

## 2024-10-11 RX ORDER — TETRACAINE HYDROCHLORIDE 5 MG/ML
1 SOLUTION OPHTHALMIC
OUTPATIENT
Start: 2024-10-11

## 2024-10-11 RX ORDER — TROPICAMIDE 10 MG/ML
1 SOLUTION/ DROPS OPHTHALMIC
OUTPATIENT
Start: 2024-10-11

## 2024-10-11 RX ORDER — PHENYLEPHRINE HYDROCHLORIDE 100 MG/ML
1 SOLUTION/ DROPS OPHTHALMIC
OUTPATIENT
Start: 2024-10-11

## 2024-10-11 RX ORDER — MOXIFLOXACIN 5 MG/ML
1 SOLUTION/ DROPS OPHTHALMIC
OUTPATIENT
Start: 2024-10-11

## 2024-10-11 RX ORDER — DORZOLAMIDE HYDROCHLORIDE AND TIMOLOL MALEATE 20; 5 MG/ML; MG/ML
1 SOLUTION/ DROPS OPHTHALMIC 2 TIMES DAILY
Qty: 10 ML | Refills: 3 | Status: SHIPPED | OUTPATIENT
Start: 2024-10-11 | End: 2024-11-10

## 2024-10-11 NOTE — PROGRESS NOTES
HPI    Patient is here today for DMEK OD follow up. Vision looks good. No pain or   discomfort.     PF BID OD  Last edited by Millie Gallo on 10/11/2024 10:30 AM.            Assessment /Plan     For exam results, see Encounter Report.    Status post corneal transplant    Fuchs' corneal dystrophy of both eyes    Other orders  -     dorzolamide-timolol 2-0.5% (COSOPT) 22.3-6.8 mg/mL ophthalmic solution; Place 1 drop into the right eye 2 (two) times daily.  Dispense: 10 mL; Refill: 3      OD DMEK graft attached and clear. Signs and symptoms of graft rejection reviewed. Need for IOP check every 3 mos while on steroids reiterated.  IOP elevated, so cosopt bid    Clinically significant corneal edema is present, which affects vision and activities of daily living. Concurrent visually significant nuclear sclerosis also present. Risks, benefits, and alternatives to surgery were discussed and patient voices understanding.    Phaco/DSEK left eye, IOL: diboo 16.5

## 2024-10-24 NOTE — PROGRESS NOTES
"[facility]   Physical Therapy Treatment Note     Name: Marti Coley  Clinic Number: [id number]    Therapy Diagnosis:   Encounter Diagnoses   Name Primary?    Decreased range of motion of left elbow Yes    Weakness of left upper extremity     Left elbow pain      Physician: Marilu Edwards NP    Visit Date: 12/20/2022    Physician Orders: PT Evaluate and Treat   Medical Diagnosis: M70.32 (ICD-10-CM) - Bursitis of left elbow, unspecified bursa  Evaluation Date: 12/5/22  Authorization Period Expiration: 12/31/22  Plan of Care Certification Period: 12/5/22- 3/5/23  Progress Note Due: 1/5/23    Visit # / Visits authorized: 16/20  FOTO: 1/ 3       Precautions: NWB L UE, only able to lift 2#'s with L UE at this time    Time In: 1100  Time Out: 1200  Total Billable Time: 30 minutes      SUBJECTIVE     Pt reports: feeling some tenderness/soreness in her elbow. States she has been keeping up with her HEP.   She was compliant with home exercise program.  Response to previous treatment: felt fine  Functional change: improved extension AROM    Pain: 2/10  Location: left elbow      OBJECTIVE     Objective Measures updated at progress report unless specified.       TREATMENT     Total Treatment time (time-based codes) separate from Evaluation: 60 minutes     Charges based on 1:1 tx:  Marti received the treatments listed below:      Patient received therapeutic exercises for 30 minutes for improved strength and AROM including:  Scapula retractions 20x 5" hold with 1/2 foam roller  L AROM wrist flexion 2 x 10 vs 3# DB  L AROM wrist extension 2 x 10 vs 3# DB  L Pronation/ supination AROM, vs 2# x 20  L Radial and ulnar deviation vs 2# 3x10 repetitions  Elbow flexion and extension in supine with towel under humerus 5" holds  Supine elbow extension prop stretch with 1# weight x45 seconds   vs red web 30x 5" hold  Finger extension vs red web 30x 5" hold  L shoulder flexion AROM with towel on wall " "30x   L shoulder ER band walk outs vs griselda band 30x  L shoulder IR band walk outs vs griselda band 30x  Rows vs red TB 3x10 repetitions  Bicep curls vs 3# 3X10 repetitions  Towel wringing vs yellow therabar 30x 5" hold  U and Rainbows vs yellow  therabar 30x5" hold            Patient received manual therapeutic technique for 00 minutes for improved soft tissue and joint mobility including:  PROM to L elbow in all planes     Pt received therapeutic activities for 00 minutes for improved tolerance to functional activities including:  UBE 3min/3min  Ball circles on wall for L elbow endurance 30x CW/ 30x CCW          PATIENT EDUCATION AND HOME EXERCISES     Home Exercises Provided and Patient Education Provided     Education provided:   PT educated pt on importance of compliance with their HEP this visit.     Written Home Exercises Provided: Patient instructed to cont prior HEP. Exercises were reviewed and Marti was able to demonstrate them prior to the end of the session.  Marti demonstrated good  understanding of the education provided. See EMR under Patient Instructions for exercises provided during therapy sessions    ASSESSMENT   Pt tolerated tx session well today. Pt demonstrates improvements in  strength and elbow extension AROM compared to her previous session. Pt was able to complete increased reps this visit with notable muscular fatigue in wrist/elbow extensors.     Marti Is progressing well towards her goals.   Pt prognosis is Good.     Pt will continue to benefit from skilled outpatient physical therapy to address the deficits listed in the problem list box on initial evaluation, provide pt/family education and to maximize pt's level of independence in the home and community environment.     Pt's spiritual, cultural and educational needs considered and pt agreeable to plan of care and goals.     Anticipated barriers to physical therapy: None      Goals:  Short Term Goals (6 Weeks):   1. " Patient will increase L Elbow flexion AROM to 140 degrees to improve functional reach  2. Patient will increase L Elbow extension to 0 degrees to improve overhead reach  3. Patient to report < or =2/10 pain in L Elbow with ADL's  4. Patient to be able to drive without limitation or difficulty   Long Term Goals (12 Weeks):   1. Patient to have decreased subjective report of disability as noted by a score of <40% on the FOTO elbow questionnaire   2. Patient to be independent with home exercise program for improved self management of condition  3. Patient will increase strength in L upper extremity to 4+/5 or greater for return to recreation  4. Patient to be able to carry 7# 80 ft with little to no difficulty for ease with household chores and shopping     PLAN   Plan of care Certification: 12/5/2022 to 3/5/2023.     Outpatient Physical Therapy 2 times weekly for 12 weeks to include the following interventions: Gait Training, Manual Therapy, Moist Heat/ Ice, Neuromuscular Re-ed, Paraffin, Patient Education, Therapeutic Activities, and Therapeutic Exercise.          Gavin Barbosa, PTA              Elevated serum glucose

## 2024-10-29 DIAGNOSIS — Z00.00 ENCOUNTER FOR MEDICARE ANNUAL WELLNESS EXAM: ICD-10-CM

## 2024-11-08 ENCOUNTER — PATIENT MESSAGE (OUTPATIENT)
Dept: ORTHOPEDICS | Facility: CLINIC | Age: 69
End: 2024-11-08
Payer: MEDICARE

## 2024-11-08 ENCOUNTER — PATIENT MESSAGE (OUTPATIENT)
Dept: INTERNAL MEDICINE | Facility: CLINIC | Age: 69
End: 2024-11-08
Payer: MEDICARE

## 2024-11-12 ENCOUNTER — PATIENT MESSAGE (OUTPATIENT)
Dept: OPHTHALMOLOGY | Facility: CLINIC | Age: 69
End: 2024-11-12
Payer: MEDICARE

## 2024-11-13 ENCOUNTER — TELEPHONE (OUTPATIENT)
Dept: OPHTHALMOLOGY | Facility: CLINIC | Age: 69
End: 2024-11-13
Payer: MEDICARE

## 2024-11-13 ENCOUNTER — OFFICE VISIT (OUTPATIENT)
Dept: ORTHOPEDICS | Facility: CLINIC | Age: 69
End: 2024-11-13
Payer: MEDICARE

## 2024-11-13 VITALS — HEIGHT: 63 IN | BODY MASS INDEX: 31.84 KG/M2 | WEIGHT: 179.69 LBS

## 2024-11-13 DIAGNOSIS — M17.0 PRIMARY OSTEOARTHRITIS OF BOTH KNEES: Primary | ICD-10-CM

## 2024-11-13 PROCEDURE — 3044F HG A1C LEVEL LT 7.0%: CPT | Mod: HCNC,CPTII,S$GLB, | Performed by: NURSE PRACTITIONER

## 2024-11-13 PROCEDURE — 3288F FALL RISK ASSESSMENT DOCD: CPT | Mod: HCNC,CPTII,S$GLB, | Performed by: NURSE PRACTITIONER

## 2024-11-13 PROCEDURE — 1159F MED LIST DOCD IN RCRD: CPT | Mod: HCNC,CPTII,S$GLB, | Performed by: NURSE PRACTITIONER

## 2024-11-13 PROCEDURE — 3008F BODY MASS INDEX DOCD: CPT | Mod: HCNC,CPTII,S$GLB, | Performed by: NURSE PRACTITIONER

## 2024-11-13 PROCEDURE — 1101F PT FALLS ASSESS-DOCD LE1/YR: CPT | Mod: HCNC,CPTII,S$GLB, | Performed by: NURSE PRACTITIONER

## 2024-11-13 PROCEDURE — 1157F ADVNC CARE PLAN IN RCRD: CPT | Mod: HCNC,CPTII,S$GLB, | Performed by: NURSE PRACTITIONER

## 2024-11-13 PROCEDURE — 99213 OFFICE O/P EST LOW 20 MIN: CPT | Mod: HCNC,25,S$GLB, | Performed by: NURSE PRACTITIONER

## 2024-11-13 PROCEDURE — 1125F AMNT PAIN NOTED PAIN PRSNT: CPT | Mod: HCNC,CPTII,S$GLB, | Performed by: NURSE PRACTITIONER

## 2024-11-13 PROCEDURE — 20610 DRAIN/INJ JOINT/BURSA W/O US: CPT | Mod: 50,HCNC,S$GLB, | Performed by: NURSE PRACTITIONER

## 2024-11-13 PROCEDURE — 1160F RVW MEDS BY RX/DR IN RCRD: CPT | Mod: HCNC,CPTII,S$GLB, | Performed by: NURSE PRACTITIONER

## 2024-11-13 PROCEDURE — 99999 PR PBB SHADOW E&M-EST. PATIENT-LVL III: CPT | Mod: PBBFAC,HCNC,, | Performed by: NURSE PRACTITIONER

## 2024-11-13 RX ADMIN — TRIAMCINOLONE ACETONIDE 80 MG: 40 INJECTION, SUSPENSION INTRA-ARTICULAR; INTRAMUSCULAR at 03:11

## 2024-11-13 NOTE — PROGRESS NOTES
CC: Pain of the Left Knee and Pain of the Right Knee      HPI:   Pt with c/o bilateral knee pain and left ankle swelling at the end of the day. The pain in the knees is aching and medial. It is worse with increased activity and better with rest. She recently took a trip where she did a lot of walking and that aggravated the pain. She has been seen in the past and has known bilateral knee djd. She has had cortisone injections with good relief and she comes in today to repeat that. She injured the left ankle in the past and is now having increased swelling in the ankle after moving around all day. There isn't pain, but the swelling goes into the foot and affects what shoes she's able to wear. She is ambulating without assistive device. There is not a limp.              ROS  General: denies fever and chills  Resp: no c/o sob  CVS: no c/o cp  MSK: c/o bilateral knee pain    PE  General: AAOx3, pleasant and cooperative  Resp: respirations even and unlabored  MSK: bilateral knee exam  0 degrees extension  120 degrees flexion  No warmth or erythema   - effusion  5/5 quad and hamstring strength    Assessment:  Bilateral knee djd  Chronic swelling of the left ankle with h/o fracture    Plan:  Cortisone injection bilateral knees today  RICE  Compression socks may help with the ankle swelling  Nsaids or tylenol prn  F/u as needed    Knee Injection Procedure Note  Diagnosis: bilateral knee degenerative arthritis  Indications: bilateral knee pain  Procedure Details: Verbal consent was obtained for the procedure. The injection site was identified and the skin was prepared with alcohol. The bilateral knee was injected from an anterolateral approach with 1 ml of Kenalog and 4 ml Lidocaine each under sterile technique using a 22 gauge needle. The needle was removed and the area cleansed and dressed.  Complications:  Patient tolerated the procedure well.    she was advised to rest the knee today, using ice and elevation as needed for  comfort and swelling.Immediate relief of the knee pain may be short lived and secondary to the lidocaine. she may have an increase in discomfort tonight followed by steady improvement over the next several days. It may take 1-2 weeks following the injection to get the full benefit of the medication.

## 2024-11-18 RX ORDER — TRIAMCINOLONE ACETONIDE 40 MG/ML
80 INJECTION, SUSPENSION INTRA-ARTICULAR; INTRAMUSCULAR
Status: COMPLETED | OUTPATIENT
Start: 2024-11-13 | End: 2024-11-13

## 2024-11-20 ENCOUNTER — LAB VISIT (OUTPATIENT)
Dept: LAB | Facility: HOSPITAL | Age: 69
End: 2024-11-20
Attending: INTERNAL MEDICINE
Payer: MEDICARE

## 2024-11-20 ENCOUNTER — TELEPHONE (OUTPATIENT)
Dept: INTERNAL MEDICINE | Facility: CLINIC | Age: 69
End: 2024-11-20
Payer: MEDICARE

## 2024-11-20 DIAGNOSIS — E78.5 HYPERLIPIDEMIA, UNSPECIFIED HYPERLIPIDEMIA TYPE: ICD-10-CM

## 2024-11-20 LAB
ALBUMIN SERPL BCP-MCNC: 3.7 G/DL (ref 3.5–5.2)
ALP SERPL-CCNC: 65 U/L (ref 40–150)
ALT SERPL W/O P-5'-P-CCNC: 22 U/L (ref 10–44)
AST SERPL-CCNC: 15 U/L (ref 10–40)
BILIRUB DIRECT SERPL-MCNC: 0.3 MG/DL (ref 0.1–0.3)
BILIRUB SERPL-MCNC: 0.7 MG/DL (ref 0.1–1)
CHOLEST SERPL-MCNC: 144 MG/DL (ref 120–199)
CHOLEST/HDLC SERPL: 3.2 {RATIO} (ref 2–5)
HDLC SERPL-MCNC: 45 MG/DL (ref 40–75)
HDLC SERPL: 31.3 % (ref 20–50)
LDLC SERPL CALC-MCNC: 83 MG/DL (ref 63–159)
NONHDLC SERPL-MCNC: 99 MG/DL
PROT SERPL-MCNC: 6.6 G/DL (ref 6–8.4)
TRIGL SERPL-MCNC: 80 MG/DL (ref 30–150)

## 2024-11-20 PROCEDURE — 80076 HEPATIC FUNCTION PANEL: CPT | Mod: HCNC | Performed by: INTERNAL MEDICINE

## 2024-11-20 PROCEDURE — 36415 COLL VENOUS BLD VENIPUNCTURE: CPT | Mod: HCNC | Performed by: INTERNAL MEDICINE

## 2024-11-20 PROCEDURE — 80061 LIPID PANEL: CPT | Mod: HCNC | Performed by: INTERNAL MEDICINE

## 2024-11-20 NOTE — TELEPHONE ENCOUNTER
----- Message from Millie sent at 11/20/2024  1:58 PM CST -----  Contact: Self [phone]  Patient is returning a phone call.    Who left a message for the patient: provider     Does patient know what this is regarding: results     Would you like a call back, or a response through your [facility] portal?: call back    Comments:

## 2024-11-20 NOTE — TELEPHONE ENCOUNTER
Called reviewed with pt connor bustamante current mecication   She is tolerating without difficulty  Labs reviewed

## 2024-11-20 NOTE — TELEPHONE ENCOUNTER
----- Message from Millie sent at 11/20/2024  1:58 PM CST -----  Contact: Self [phone]  Patient is returning a phone call.    Who left a message for the patient: provider     Does patient know what this is regarding: results     Would you like a call back, or a response through your [facility] portal?: call back    Comments:

## 2024-11-21 ENCOUNTER — TELEPHONE (OUTPATIENT)
Dept: OPHTHALMOLOGY | Facility: CLINIC | Age: 69
End: 2024-11-21
Payer: MEDICARE

## 2024-11-21 DIAGNOSIS — H25.12 NUCLEAR SCLEROSIS OF LEFT EYE: Primary | ICD-10-CM

## 2024-11-21 DIAGNOSIS — H18.512 FUCHS' CORNEAL DYSTROPHY OF LEFT EYE: ICD-10-CM

## 2025-01-13 ENCOUNTER — TELEPHONE (OUTPATIENT)
Dept: OPHTHALMOLOGY | Facility: CLINIC | Age: 70
End: 2025-01-13
Payer: MEDICARE

## 2025-01-13 NOTE — TELEPHONE ENCOUNTER
Patient given arrival time of 10:30 am on Thursday January 16  . Nothing to eat or drink after 11:59 pm. Start drops into the operative eye Tuesday. [address] [facility]

## 2025-01-15 NOTE — PRE-PROCEDURE INSTRUCTIONS
Patient reviewed on 1/15/2025.  Okay to proceed at [facility]. The following pre-procedure instructions and arrival time have been sent to patient portal for review.  Patient confirmed receiving pre-procedure instructions via [employer] portal.       Dear Marti     Below you will find basic pre-procedure instructions in preparation for your procedure on 1/16/24 with Dr. Guevara                    You should receive your arrival time within 24-48 hours of your scheduled procedure date from the  at your surgeon's office.     -Nothing to eat or drink after midnight the night before your procedure until after your procedure, except AM meds with small sips of water.     - HOLD all oral Diabetic medications night before and morning of procedure  - HOLD all Insulin morning of procedure  - HOLD all Fluid pills morning of procedure  - HOLD all non-insulin shots until after surgery (Ozempic, Mounjaro, Trulicity, Victoza, Byetta, Wegovy and Adlyxin) (7 days prior)  - HOLD all vitamins, minerals and herbal supplements morning of procedure   - TAKE all B/P meds, EXCEPT those that contain a fluid pill (ex. Lasix, Hydroclorothiazide/HCTZ, Spirnolactone)  - USE inhalers as needed and bring AM of surgery  - USE EYE DROPS as directed  -TAKE blood thinner meds AM of surgery unless otherwise instructed by your provider to not take     - Shower and wash face with antibacterial soap (ex. Dial) for 3 mins PM prior and AM of surgery  - No powder, lotions, creams, oils, gels, ointments, makeup,  or jewelry    - Wear comfortable clothing (button up shirt)     (Patient is required to have a responsible ride to transport home, ride may not leave while patient is in surgery)     -- [facility], 2nd floor Surgery Center, located   @ [address] [place] [address]  2nd Floor Registration        If you have any questions or concerns please feel free to contact your surgeon's office.           Please reply to  this message as receipt of delivery.     [place], LPN [facility]  Pre-Admit - Anesthesia Dept

## 2025-01-16 ENCOUNTER — HOSPITAL ENCOUNTER (OUTPATIENT)
Facility: HOSPITAL | Age: 70
Discharge: HOME | End: 2025-01-16
Attending: OPHTHALMOLOGY | Admitting: OPHTHALMOLOGY
Payer: MEDICARE

## 2025-01-16 VITALS
TEMPERATURE: 98 F | OXYGEN SATURATION: 97 % | RESPIRATION RATE: 16 BRPM | HEART RATE: 74 BPM | DIASTOLIC BLOOD PRESSURE: 60 MMHG | SYSTOLIC BLOOD PRESSURE: 159 MMHG

## 2025-01-16 DIAGNOSIS — Z94.7 STATUS POST CORNEAL TRANSPLANT: ICD-10-CM

## 2025-01-16 DIAGNOSIS — H18.513 FUCHS' CORNEAL DYSTROPHY OF BOTH EYES: ICD-10-CM

## 2025-01-16 DIAGNOSIS — H25.12 NUCLEAR SCLEROSIS OF LEFT EYE: ICD-10-CM

## 2025-01-16 DIAGNOSIS — H18.512 FUCHS' CORNEAL DYSTROPHY OF LEFT EYE: Primary | ICD-10-CM

## 2025-01-16 PROCEDURE — 63600175 PHARM REV CODE 636 W HCPCS: Performed by: OPHTHALMOLOGY

## 2025-01-16 PROCEDURE — 66984 XCAPSL CTRC RMVL W/O ECP: CPT | Mod: 51,LT,, | Performed by: OPHTHALMOLOGY

## 2025-01-16 PROCEDURE — 99152 MOD SED SAME PHYS/QHP 5/>YRS: CPT | Performed by: OPHTHALMOLOGY

## 2025-01-16 PROCEDURE — 65757 PREP CORNEAL ENDO ALLOGRAFT: CPT | Mod: ,,, | Performed by: OPHTHALMOLOGY

## 2025-01-16 PROCEDURE — 99153 MOD SED SAME PHYS/QHP EA: CPT | Performed by: OPHTHALMOLOGY

## 2025-01-16 PROCEDURE — 36000706: Performed by: OPHTHALMOLOGY

## 2025-01-16 PROCEDURE — 36000707: Performed by: OPHTHALMOLOGY

## 2025-01-16 PROCEDURE — 94760 N-INVAS EAR/PLS OXIMETRY 1: CPT

## 2025-01-16 PROCEDURE — 99900035 HC TECH TIME PER 15 MIN (STAT)

## 2025-01-16 PROCEDURE — V2632 POST CHMBR INTRAOCULAR LENS: HCPCS | Performed by: OPHTHALMOLOGY

## 2025-01-16 PROCEDURE — 25000003 PHARM REV CODE 250: Performed by: OPHTHALMOLOGY

## 2025-01-16 PROCEDURE — 25000003 PHARM REV CODE 250

## 2025-01-16 PROCEDURE — 71000015 HC POSTOP RECOV 1ST HR: Performed by: OPHTHALMOLOGY

## 2025-01-16 PROCEDURE — V2785 CORNEAL TISSUE PROCESSING: HCPCS | Performed by: OPHTHALMOLOGY

## 2025-01-16 PROCEDURE — 65756 CORNEAL TRNSPL ENDOTHELIAL: CPT | Mod: LT,,, | Performed by: OPHTHALMOLOGY

## 2025-01-16 DEVICE — TECNIS SIMPLICITY TECNIS EYHANCE U 16.5D
Type: IMPLANTABLE DEVICE | Site: EYE | Status: FUNCTIONAL
Brand: TECNIS SIMPLICITY

## 2025-01-16 RX ORDER — CYCLOP/TROP/PROPA/PHEN/KET/WAT 1-1-0.1%
1 DROPS (EA) OPHTHALMIC (EYE) EVERY 5 MIN PRN
Status: COMPLETED | OUTPATIENT
Start: 2025-01-16 | End: 2025-01-16

## 2025-01-16 RX ORDER — MIDAZOLAM HYDROCHLORIDE 1 MG/ML
1 INJECTION, SOLUTION INTRAMUSCULAR; INTRAVENOUS
Status: DISCONTINUED | OUTPATIENT
Start: 2025-01-16 | End: 2025-01-16 | Stop reason: HOSPADM

## 2025-01-16 RX ORDER — PHENYLEPHRINE HYDROCHLORIDE 100 MG/ML
1 SOLUTION/ DROPS OPHTHALMIC
Status: DISCONTINUED | OUTPATIENT
Start: 2025-01-16 | End: 2025-01-16 | Stop reason: HOSPADM

## 2025-01-16 RX ORDER — PROPARACAINE HYDROCHLORIDE 5 MG/ML
SOLUTION/ DROPS OPHTHALMIC
Status: DISCONTINUED | OUTPATIENT
Start: 2025-01-16 | End: 2025-01-16 | Stop reason: HOSPADM

## 2025-01-16 RX ORDER — ONDANSETRON HYDROCHLORIDE 2 MG/ML
4 INJECTION, SOLUTION INTRAVENOUS EVERY 12 HOURS PRN
Status: DISCONTINUED | OUTPATIENT
Start: 2025-01-16 | End: 2025-01-16 | Stop reason: HOSPADM

## 2025-01-16 RX ORDER — FENTANYL CITRATE 50 UG/ML
25 INJECTION, SOLUTION INTRAMUSCULAR; INTRAVENOUS
Status: DISCONTINUED | OUTPATIENT
Start: 2025-01-16 | End: 2025-01-16 | Stop reason: HOSPADM

## 2025-01-16 RX ORDER — SODIUM CHLORIDE 0.9 % (FLUSH) 0.9 %
10 SYRINGE (ML) INJECTION
Status: DISCONTINUED | OUTPATIENT
Start: 2025-01-16 | End: 2025-01-16 | Stop reason: HOSPADM

## 2025-01-16 RX ORDER — PHENYLEPHRINE HYDROCHLORIDE 25 MG/ML
1 SOLUTION/ DROPS OPHTHALMIC
Status: DISCONTINUED | OUTPATIENT
Start: 2025-01-16 | End: 2025-01-16

## 2025-01-16 RX ORDER — LIDOCAINE HYDROCHLORIDE 20 MG/ML
INJECTION, SOLUTION INFILTRATION; PERINEURAL
Status: DISCONTINUED | OUTPATIENT
Start: 2025-01-16 | End: 2025-01-16 | Stop reason: HOSPADM

## 2025-01-16 RX ORDER — BUPIVACAINE HYDROCHLORIDE 7.5 MG/ML
INJECTION, SOLUTION EPIDURAL; RETROBULBAR
Status: DISCONTINUED | OUTPATIENT
Start: 2025-01-16 | End: 2025-01-16 | Stop reason: HOSPADM

## 2025-01-16 RX ORDER — DEXAMETHASONE SODIUM PHOSPHATE 4 MG/ML
INJECTION, SOLUTION INTRA-ARTICULAR; INTRALESIONAL; INTRAMUSCULAR; INTRAVENOUS; SOFT TISSUE
Status: DISCONTINUED | OUTPATIENT
Start: 2025-01-16 | End: 2025-01-16 | Stop reason: HOSPADM

## 2025-01-16 RX ORDER — CEFAZOLIN SODIUM 1 G/3ML
INJECTION, POWDER, FOR SOLUTION INTRAMUSCULAR; INTRAVENOUS
Status: DISCONTINUED | OUTPATIENT
Start: 2025-01-16 | End: 2025-01-16 | Stop reason: HOSPADM

## 2025-01-16 RX ORDER — ATROPINE SULFATE 10 MG/ML
SOLUTION/ DROPS OPHTHALMIC
Status: DISCONTINUED | OUTPATIENT
Start: 2025-01-16 | End: 2025-01-16 | Stop reason: HOSPADM

## 2025-01-16 RX ORDER — TROPICAMIDE 10 MG/ML
1 SOLUTION/ DROPS OPHTHALMIC
Status: DISCONTINUED | OUTPATIENT
Start: 2025-01-16 | End: 2025-01-16

## 2025-01-16 RX ORDER — LIDOCAINE HYDROCHLORIDE 40 MG/ML
INJECTION, SOLUTION RETROBULBAR
Status: DISCONTINUED | OUTPATIENT
Start: 2025-01-16 | End: 2025-01-16 | Stop reason: HOSPADM

## 2025-01-16 RX ORDER — MOXIFLOXACIN 5 MG/ML
1 SOLUTION/ DROPS OPHTHALMIC
Status: COMPLETED | OUTPATIENT
Start: 2025-01-16 | End: 2025-01-16

## 2025-01-16 RX ORDER — TETRACAINE HYDROCHLORIDE 5 MG/ML
1 SOLUTION OPHTHALMIC
Status: DISCONTINUED | OUTPATIENT
Start: 2025-01-16 | End: 2025-01-16

## 2025-01-16 RX ADMIN — MOXIFLOXACIN OPHTHALMIC 1 DROP: 5 SOLUTION/ DROPS OPHTHALMIC at 11:01

## 2025-01-16 RX ADMIN — FENTANYL CITRATE 100 MCG: 50 INJECTION, SOLUTION INTRAMUSCULAR; INTRAVENOUS at 11:01

## 2025-01-16 RX ADMIN — MIDAZOLAM HYDROCHLORIDE 2 MG: 1 INJECTION, SOLUTION INTRAMUSCULAR; INTRAVENOUS at 11:01

## 2025-01-16 RX ADMIN — Medication 1 DROP: at 11:01

## 2025-01-16 NOTE — DISCHARGE INSTRUCTIONS
CATARACT SURGERY    POST-OPERATIVE INSTRUCTIONS    · Apply drops THREE times a day into operative eye for 30 days.    · DO NOT rub your eye    · Wear protective sunglasses during the day    · Resume moderate activity    · Bathe/shower/wash face normally    · DO NOT apply makeup around the operative eye for 1 week.         You should expect    - Blurry vision and halos for 24-48 hours    - Dilated pupil for 24-48 hours    - Scratchy feeling in the eye for 1-2 days    - Curved shadow in your peripheral vision for 2-3 weeks    - Occasional flickering of lights for up to 1 week    -If you experience severe pain or nausea, please call Dr Guevara or the on-call doctor at [phone]    - Plan to see Dr Guevara tomorrow .      [facility]    [address] [facility] [id number]    ** Most patients can drive the next day, but if you do not feel comfortable driving, please arrange for transportation.

## 2025-01-16 NOTE — DISCHARGE SUMMARY
Outcome: Successful outpatient ophthalmic surgical procedure  Preprinted Instructions given to patient.  Regular diet.  Activity: No restrictions  Meds: see Med Rec  Condition: stable  Follow up: 1 day with Dr Guevara  Disposition: Home  Diagnosis: s/p eye surgery  Date of discharge: 01/16/2025

## 2025-01-16 NOTE — OP NOTE
SURGEON:  Zonia Guevara M.D.    PREOPERATIVE DIAGNOSES:  Visually Significant Cataract  Fuch's Corneal Dystrophy  Corneal edema    POSTOPERATIVE DIAGNOSES:    Visually Significant Cataract  Fuch's Corneal Dystrophy  Corneal edema    PROCEDURES PERFORMED:  1) Descement's Stripping Endothelial Keratoplasty (DSEK) (77628)  2) and Phacoemulsification with Intraocular Lens Implant (51862),   both procedures in the  left eye   With moderate sedation >10min (00746)    DATE OF SURGERY: 01/16/2025    ANESTHESIA:  Under my direct supervision, intravenous moderate sedation was administered during the course of this procedure, with continuous monitoring of hemodynamic parameters. Total time of sedation and amount of sedatives are documented in the nursing logs.      GRAFT SIZE:  8.0 mm    LENS IMPLANT: diboo 16.5    COMPLICATIONS:  None.    INDICATIONS:  The patient has a history of Fuchs' endothelial dystrophy with corneal edema.  There is also  a history of a visually significant cataract, both of which have diminished the patients vision.  After a thorough discussion of the risks, benefits and alternatives to proceeding with a combined cataract surgery, as well as a corneal transplantation, the patient understands the risks and benefits and agrees to proceed with surgery.    PROCEDURE IN DETAIL:  The patient was brought to the operating room in the supine position, where the eye was prepped and draped in standard sterile fashion, after having received a retrobulbar block consisting of a 50/50 mixture of lidocaine and bupivacaine under conscious sedation.  The procedure was begun by the creation of a paracentesisincision, through which viscoelastic was used to fill the anterior chamber.    Next, a 4mm temporal limbal tunnel incision was constructed and an internal opening of 2.4 mm was created with the keratome.  Continuous curvilinear capsulorrhexis was fashioned and hydrodissection was done with BSS.  The cataract was  removed by phacoemulsification without  complication and the lens implanted into the capsular bag.  With the eye still maintained with viscoelastic, an 8 mm vaibhav was made in the center of the cornea and then reverse Sinskey used to score Descemet's membrane.  A scraper was then used to remove Descemet's in its entirety and all remaining viscoelastics were removed from the eye.    Attention was then directed to the side table, where the previously microkeratome cut donor tissue was trephined with an 8.25 mm [place] trephine.  Attention was then redirected to the patient's eye and this corneal transplant tissue was   inserted into the anterior chamber using an injector, after enlarging incision to 4 mm.  The anterior chamber was reformed with BSS and 10-0 nylon sutures were placed in a wound.  The DSEK button was repositioned centrally and air used to fill the anterior chamber.  Ten minutes were allowed to elapse for adherence, and then the air was replaced with BSS except for a small air bubble.  The patient will remain supine in the postoperative recovery area for one hour to allow better adhesion of the graft to  the posterior aspect of the cornea.  The patient will be seen tomorrow in the eye clinic.

## 2025-01-16 NOTE — H&P
Pre-op Eye Surgery H&P     CC: Painless, progressive loss of vision  Present Illness :Corneal edema/opacity  Allergies/Current Meds: see meds  Mental Status: A&O x3  Pertinent Medical History: n/a     Physical Exam  General: NAD  HEENT: Eye white/quiet  Lungs: Adequate respirations  Heart: + pulses  Abdomen: soft  Rectal/GI/: deferred     Impression: Corneal Edema/opacity  Plan:Corneal Transplant      ASA Score: II    Mallampati Score: II    Plan for Sedation: Moderate Sedation    Patient or Family History of Anesthesia problems : No    Any changes affecting the anesthesia assessment which may have changed since the initial assessment and H and P: No

## 2025-01-17 ENCOUNTER — OFFICE VISIT (OUTPATIENT)
Dept: OPHTHALMOLOGY | Facility: CLINIC | Age: 70
End: 2025-01-17
Payer: MEDICARE

## 2025-01-17 DIAGNOSIS — H18.513 FUCHS' CORNEAL DYSTROPHY OF BOTH EYES: Primary | ICD-10-CM

## 2025-01-17 DIAGNOSIS — Z94.7 STATUS POST CORNEAL TRANSPLANT: ICD-10-CM

## 2025-01-17 PROCEDURE — 99024 POSTOP FOLLOW-UP VISIT: CPT | Mod: S$GLB,,, | Performed by: OPHTHALMOLOGY

## 2025-01-17 PROCEDURE — 1157F ADVNC CARE PLAN IN RCRD: CPT | Mod: CPTII,S$GLB,, | Performed by: OPHTHALMOLOGY

## 2025-01-17 PROCEDURE — 1160F RVW MEDS BY RX/DR IN RCRD: CPT | Mod: CPTII,S$GLB,, | Performed by: OPHTHALMOLOGY

## 2025-01-17 PROCEDURE — 1159F MED LIST DOCD IN RCRD: CPT | Mod: CPTII,S$GLB,, | Performed by: OPHTHALMOLOGY

## 2025-01-17 PROCEDURE — 99999 PR PBB SHADOW E&M-EST. PATIENT-LVL I: CPT | Mod: PBBFAC,,, | Performed by: OPHTHALMOLOGY

## 2025-01-17 NOTE — PROGRESS NOTES
HPI    PROCEDURES PERFORMED:  1) Descement's Stripping Endothelial Keratoplasty (DSEK) (02588)  2) and Phacoemulsification with Intraocular Lens Implant (83164),   both procedures in the  left eye   With moderate sedation >10min (91326)     DATE OF SURGERY: 01/16/2025      1 day Post Op OS   Pt state no complaints at this time     Last edited by Dar Pisano on 1/17/2025 10:01 AM.            Assessment /Plan     For exam results, see Encounter Report.    Fuchs' corneal dystrophy of both eyes    Status post corneal transplant      DSEK POD1: DSEK Graft attached. 2+ MCE. Wound stable.

## 2025-01-20 ENCOUNTER — PATIENT MESSAGE (OUTPATIENT)
Dept: OPHTHALMOLOGY | Facility: CLINIC | Age: 70
End: 2025-01-20
Payer: MEDICARE

## 2025-01-28 ENCOUNTER — OFFICE VISIT (OUTPATIENT)
Dept: OPHTHALMOLOGY | Facility: CLINIC | Age: 70
End: 2025-01-28
Payer: MEDICARE

## 2025-01-28 DIAGNOSIS — Z94.7 STATUS POST CORNEAL TRANSPLANT: Primary | ICD-10-CM

## 2025-01-28 PROCEDURE — 3288F FALL RISK ASSESSMENT DOCD: CPT | Mod: CPTII,S$GLB,, | Performed by: OPHTHALMOLOGY

## 2025-01-28 PROCEDURE — 1157F ADVNC CARE PLAN IN RCRD: CPT | Mod: CPTII,S$GLB,, | Performed by: OPHTHALMOLOGY

## 2025-01-28 PROCEDURE — 1101F PT FALLS ASSESS-DOCD LE1/YR: CPT | Mod: CPTII,S$GLB,, | Performed by: OPHTHALMOLOGY

## 2025-01-28 PROCEDURE — 1126F AMNT PAIN NOTED NONE PRSNT: CPT | Mod: CPTII,S$GLB,, | Performed by: OPHTHALMOLOGY

## 2025-01-28 PROCEDURE — 1160F RVW MEDS BY RX/DR IN RCRD: CPT | Mod: CPTII,S$GLB,, | Performed by: OPHTHALMOLOGY

## 2025-01-28 PROCEDURE — 99024 POSTOP FOLLOW-UP VISIT: CPT | Mod: S$GLB,,, | Performed by: OPHTHALMOLOGY

## 2025-01-28 PROCEDURE — 99999 PR PBB SHADOW E&M-EST. PATIENT-LVL III: CPT | Mod: PBBFAC,,, | Performed by: OPHTHALMOLOGY

## 2025-01-28 PROCEDURE — 1159F MED LIST DOCD IN RCRD: CPT | Mod: CPTII,S$GLB,, | Performed by: OPHTHALMOLOGY

## 2025-01-28 NOTE — PROGRESS NOTES
HPI    PROCEDURES PERFORMED:   1) Descement's Stripping Endothelial Keratoplasty (DSEK) (64575)   2) and Phacoemulsification with Intraocular Lens Implant (13985),    both procedures in the  left eye   With moderate sedation >10min (52627)      DATE OF SURGERY: 01/ 16/2025     1 week Post Op Dsek OS     Last edited by Dar Pisano on 1/28/2025 10:00 AM.            Assessment /Plan     For exam results, see Encounter Report.    Status post corneal transplant      OD DMEK graft attached and clear. Signs and symptoms of graft rejection reviewed. Need for IOP check every 3 mos while on steroids reiterated.  6/2024  PF bid, Cosopt bid    OS DSEK graft attached and clear. Signs and symptoms of graft rejection reviewed. Need for IOP check every 3 mos while on steroids reiterated.  1/2025  PF qid

## 2025-02-06 RX ORDER — PREDNISOLONE ACETATE 10 MG/ML
SUSPENSION/ DROPS OPHTHALMIC
Qty: 10 ML | Refills: 3 | Status: SHIPPED | OUTPATIENT
Start: 2025-02-06

## 2025-02-22 DIAGNOSIS — Z00.00 ENCOUNTER FOR MEDICARE ANNUAL WELLNESS EXAM: ICD-10-CM

## 2025-04-08 ENCOUNTER — OFFICE VISIT (OUTPATIENT)
Dept: OPHTHALMOLOGY | Facility: CLINIC | Age: 70
End: 2025-04-08
Payer: MEDICARE

## 2025-04-08 ENCOUNTER — OFFICE VISIT (OUTPATIENT)
Dept: ORTHOPEDICS | Facility: CLINIC | Age: 70
End: 2025-04-08
Payer: MEDICARE

## 2025-04-08 DIAGNOSIS — M17.0 PRIMARY OSTEOARTHRITIS OF BOTH KNEES: Primary | ICD-10-CM

## 2025-04-08 DIAGNOSIS — Z94.7 STATUS POST CORNEAL TRANSPLANT: ICD-10-CM

## 2025-04-08 DIAGNOSIS — H18.513 FUCHS' CORNEAL DYSTROPHY OF BOTH EYES: Primary | ICD-10-CM

## 2025-04-08 PROCEDURE — 1159F MED LIST DOCD IN RCRD: CPT | Mod: HCNC,CPTII,S$GLB, | Performed by: NURSE PRACTITIONER

## 2025-04-08 PROCEDURE — 3288F FALL RISK ASSESSMENT DOCD: CPT | Mod: CPTII,S$GLB,, | Performed by: OPHTHALMOLOGY

## 2025-04-08 PROCEDURE — 1159F MED LIST DOCD IN RCRD: CPT | Mod: CPTII,S$GLB,, | Performed by: OPHTHALMOLOGY

## 2025-04-08 PROCEDURE — 1160F RVW MEDS BY RX/DR IN RCRD: CPT | Mod: CPTII,S$GLB,, | Performed by: OPHTHALMOLOGY

## 2025-04-08 PROCEDURE — 99024 POSTOP FOLLOW-UP VISIT: CPT | Mod: S$GLB,,, | Performed by: OPHTHALMOLOGY

## 2025-04-08 PROCEDURE — 20610 DRAIN/INJ JOINT/BURSA W/O US: CPT | Mod: 50,HCNC,S$GLB, | Performed by: NURSE PRACTITIONER

## 2025-04-08 PROCEDURE — 99999 PR PBB SHADOW E&M-EST. PATIENT-LVL III: CPT | Mod: PBBFAC,,, | Performed by: OPHTHALMOLOGY

## 2025-04-08 PROCEDURE — 1160F RVW MEDS BY RX/DR IN RCRD: CPT | Mod: HCNC,CPTII,S$GLB, | Performed by: NURSE PRACTITIONER

## 2025-04-08 PROCEDURE — 1157F ADVNC CARE PLAN IN RCRD: CPT | Mod: HCNC,CPTII,S$GLB, | Performed by: NURSE PRACTITIONER

## 2025-04-08 PROCEDURE — 1125F AMNT PAIN NOTED PAIN PRSNT: CPT | Mod: HCNC,CPTII,S$GLB, | Performed by: NURSE PRACTITIONER

## 2025-04-08 PROCEDURE — 1126F AMNT PAIN NOTED NONE PRSNT: CPT | Mod: CPTII,S$GLB,, | Performed by: OPHTHALMOLOGY

## 2025-04-08 PROCEDURE — 99999 PR PBB SHADOW E&M-EST. PATIENT-LVL II: CPT | Mod: PBBFAC,HCNC,, | Performed by: NURSE PRACTITIONER

## 2025-04-08 PROCEDURE — 99213 OFFICE O/P EST LOW 20 MIN: CPT | Mod: HCNC,25,S$GLB, | Performed by: NURSE PRACTITIONER

## 2025-04-08 PROCEDURE — 1157F ADVNC CARE PLAN IN RCRD: CPT | Mod: CPTII,S$GLB,, | Performed by: OPHTHALMOLOGY

## 2025-04-08 PROCEDURE — 1101F PT FALLS ASSESS-DOCD LE1/YR: CPT | Mod: CPTII,S$GLB,, | Performed by: OPHTHALMOLOGY

## 2025-04-08 RX ADMIN — TRIAMCINOLONE ACETONIDE 80 MG: 40 INJECTION, SUSPENSION INTRA-ARTICULAR; INTRAMUSCULAR at 12:04

## 2025-04-08 NOTE — PROGRESS NOTES
CC: Pain of the Right Knee and Pain of the Left Knee      HPI:   Pt with c/o bilateral knee pain. The pain is aching and global. It is worse with increased activity. She recently visited [place] and did a lot of hiking which she thinks likely caused increased inflammation and pain. She has been seen in the past and has known bilateral knee djd. She has had cortisone injections with good relief and she comes in today to repeat that.       ROS  General: denies fever and chills  Resp: no c/o sob  CVS: no c/o cp  MSK: c/o bilateral knee pain    PE  General: AAOx3, pleasant and cooperative  Resp: respirations even and unlabored  MSK: bilateral knee exam  0 degrees extension  120 degrees flexion  No warmth or erythema   - effusion  5/5 quad and hamstring strength  + crepitus    Assessment:  Bilateral knee djd    Plan:  Bilateral knee cortisone injections today  RICE  Nsaids prn  F/u as needed    Knee Injection Procedure Note  Diagnosis: bilateral knee degenerative arthritis  Indications: bilateral knee pain  Procedure Details: Verbal consent was obtained for the procedure. The injection site was identified and the skin was prepared with alcohol. The bilateral knee was injected from an anterolateral approach with 1 ml of Kenalog and 4 ml Lidocaine each under sterile technique using a 25 gauge needle. The needle was removed and the area cleansed and dressed.  Complications:  Patient tolerated the procedure well.    she was advised to rest the knee today, using ice and elevation as needed for comfort and swelling.Immediate relief of the knee pain may be short lived and secondary to the lidocaine. she may have an increase in discomfort tonight followed by steady improvement over the next several days. It may take 1-2 weeks following the injection to get the full benefit of the medication.

## 2025-04-08 NOTE — PROGRESS NOTES
HPI    PROCEDURES PERFORMED:   1) Descement's Stripping Endothelial Keratoplasty (DSEK) (27053)   2) and Phacoemulsification with Intraocular Lens Implant (46803),    both procedures in the  left eye   With moderate sedation >10min (50891)      DATE OF SURGERY:  01/ 16/2025     PF QID OS    Pt here for IOP check.  Pt states doing well. Pt states her IOP was   checked last week and was 14 OU. Pt denies eye pain OU.   Last edited by Yaneth Jack MA on 4/8/2025 11:21 AM.            Assessment /Plan     For exam results, see Encounter Report.    Fuchs' corneal dystrophy of both eyes    Status post corneal transplant        OD DMEK graft attached and clear. Signs and symptoms of graft rejection reviewed. Need for IOP check every 3 mos while on steroids reiterated.  6/2024  OFF ALL GTTS    OS DSEK graft attached and clear. Signs and symptoms of graft rejection reviewed. Need for IOP check every 3 mos while on steroids reiterated.  1/2025  PF BID  (MAY NEED COSOPT AS OD DID)

## 2025-04-15 ENCOUNTER — PATIENT MESSAGE (OUTPATIENT)
Dept: OPHTHALMOLOGY | Facility: CLINIC | Age: 70
End: 2025-04-15
Payer: MEDICARE

## 2025-04-23 ENCOUNTER — OFFICE VISIT (OUTPATIENT)
Dept: OPHTHALMOLOGY | Facility: CLINIC | Age: 70
End: 2025-04-23
Attending: OPHTHALMOLOGY
Payer: MEDICARE

## 2025-04-23 DIAGNOSIS — H16.229 KERATITIS SICCA: Primary | ICD-10-CM

## 2025-04-23 DIAGNOSIS — H18.513 FUCHS' CORNEAL DYSTROPHY OF BOTH EYES: ICD-10-CM

## 2025-04-23 DIAGNOSIS — Z94.7 STATUS POST CORNEAL TRANSPLANT: ICD-10-CM

## 2025-04-23 PROCEDURE — 99999 PR PBB SHADOW E&M-EST. PATIENT-LVL II: CPT | Mod: PBBFAC,HCNC,, | Performed by: OPHTHALMOLOGY

## 2025-04-23 NOTE — PROGRESS NOTES
HPI    Follow up DSEK     Patient state it was a few days she felt like something in the eye.   Pt state the instruction was dropped down to two times a day but she   started back four times a day, that cesar fix the problem.        PF QID OS  Last edited by Dar Pisano on 4/23/2025  9:12 AM.            Assessment /Plan     For exam results, see Encounter Report.    Keratitis sicca    Fuchs' corneal dystrophy of both eyes    Status post corneal transplant      ATs for erosion      OD DMEK graft attached and clear. Signs and symptoms of graft rejection reviewed. Need for IOP check every 3 mos while on steroids reiterated.  6/2024  OFF ALL GTTS    OS DSEK graft attached and clear. Signs and symptoms of graft rejection reviewed. Need for IOP check every 3 mos while on steroids reiterated.  1/2025  PF BID  Add cosopt bid

## 2025-04-26 RX ORDER — TRIAMCINOLONE ACETONIDE 40 MG/ML
80 INJECTION, SUSPENSION INTRA-ARTICULAR; INTRAMUSCULAR
Status: COMPLETED | OUTPATIENT
Start: 2025-04-08 | End: 2025-04-08

## 2025-06-18 ENCOUNTER — PATIENT MESSAGE (OUTPATIENT)
Dept: OPHTHALMOLOGY | Facility: CLINIC | Age: 70
End: 2025-06-18
Payer: MEDICARE

## 2025-07-07 ENCOUNTER — E-VISIT (OUTPATIENT)
Dept: INTERNAL MEDICINE | Facility: CLINIC | Age: 70
End: 2025-07-07
Payer: MEDICARE

## 2025-07-07 DIAGNOSIS — U07.1 COVID-19: Primary | ICD-10-CM

## 2025-07-07 NOTE — PROGRESS NOTES
Patient ID: Marti Coley is a 69 y.o. female.        E-Visit Time Tracking:   Day 1 Time (in minutes): 5  Total Time (in minutes): 5      Chief Complaint: General Illness (Entered automatically based on patient selection in [employer].)      The patient initiated a request through [employer] on 7/7/2025 for evaluation and management with a chief complaint of General Illness (Entered automatically based on patient selection in [employer].)     I evaluated the questionnaire submission on 07/07/2025.    Reports mild upper respiratory symptoms started on Saturday she tested yesterday positive for COVID was wondering if there was anything else she should take she has been taking over-the-counter medications for her fever and hydrating she denies any shortness a breath    [facility]-Cough And Cold    7/7/2025  8:32 AM CDT - Filed by Patient   What do you need help with? Covid 19   Do you agree to participate in an E-Visit? Yes   If you have any of the following symptoms, go to your local emergency room or call 911: I acknowledge   What is the main issue you would like addressed today? I tested posituve for covid yesterday. Symptoms started in the middle of the night on Saturday. Do i need to take anything?   Do you think you might have COVID-19 or the Flu? (COVID-19, Flu, No, Not sure) COVID-19   Have you tested positive for COVID-19 or Flu?(COVID-19, Flu, No) COVID-19   What symptoms do you have? (Chills, Cough, Diarrhea, Fatigue, Fever, Headache, Stuffy nose,  Loss of taste or smell, Muscule or body aches, Nausea, Runny nose, Sore throat, None) Cough;  Stuffy nose;  Runny nose   When did your concern begin? 7/5/2025   What have you tried to help your symptoms?(Cold medication, Cough syrup, Drank fluids (water, tea), Gargled salt water, Ibuprofen or Naproxen, Rest and sleep, Tylenol, Other) Drinking more fluids;  Rest and sleep;  Tylenol   Provide any additional information you feel is important. I did have  fever and chills but my temperature was normal this morning. I had a headache yesterday.   Please attach any relevant images or files    Are you able to take your vitals? No         Encounter Diagnosis   Name Primary?    COVID-19 Yes        No orders of the defined types were placed in this encounter.       Discussed with patient options conservative measures treat fever flu-like symptoms she can use Mucinex for cough Tylenol for fever rest hydrate we discussed use of Paxlovid for more severe COVID symptoms high-risk we discussed rebound she would like to hold on additional medications but will monitor for worsening call if concerns we discussed isolation precautions     No follow-ups on file.

## 2025-07-07 NOTE — PROGRESS NOTES
Patient ID: Marti Coley is a 69 y.o. female.    {Message Patient Link  Message Patient:[id number]}    E-Visit Time Tracking: {Document Time:21062511}  Day 1 Time (in minutes): 5  Total Time (in minutes): 5    { Consider updating the patient's record with a blood pressure reading.  The last blood pressure was elevated or no blood pressure value has been documented in the past year. Click this link to document.     :[id number]}  Chief Complaint: General Illness (Entered automatically based on patient selection in "[employer]".)  { Quick Link CC:[id number]}    The patient initiated a request through "[employer]" on 7/7/2025 for evaluation and management with a chief complaint of General Illness (Entered automatically based on patient selection in "[employer].)     I evaluated the questionnaire submission on 07/07/2025.    Reports mild upper respiratory symptoms started on Saturday she tested yesterday positive for COVID was wondering if there was anything else she should take she has been taking over-the-counter medications for her fever and hydrating she denies any shortness a breath    [facility]Cough And Cold    7/7/2025  8:32 AM CDT - Filed by Patient   What do you need help with? Covid 19   Do you agree to participate in an E-Visit? Yes   If you have any of the following symptoms, go to your local emergency room or call 911: I acknowledge   What is the main issue you would like addressed today? I tested posituve for covid yesterday. Symptoms started in the middle of the night on Saturday. Do i need to take anything?   Do you think you might have COVID-19 or the Flu? (COVID-19, Flu, No, Not sure) COVID-19   Have you tested positive for COVID-19 or Flu?(COVID-19, Flu, No) COVID-19   What symptoms do you have? (Chills, Cough, Diarrhea, Fatigue, Fever, Headache, Stuffy nose,  Loss of taste or smell, Muscule or body aches, Nausea, Runny nose, Sore throat, None) Cough;  Stuffy nose;  Runny nose   When did your concern  begin? 7/5/2025   What have you tried to help your symptoms?(Cold medication, Cough syrup, Drank fluids (water, tea), Gargled salt water, Ibuprofen or Naproxen, Rest and sleep, Tylenol, Other) Drinking more fluids;  Rest and sleep;  Tylenol   Provide any additional information you feel is important. I did have fever and chills but my temperature was normal this morning. I had a headache yesterday.   Please attach any relevant images or files    Are you able to take your vitals? No         Encounter Diagnosis   Name Primary?    COVID-19 Yes        No orders of the defined types were placed in this encounter.       Discussed with patient options conservative measures treat fever flu-like symptoms she can use Mucinex for cough Tylenol for fever rest hydrate we discussed use of Paxlovid for more severe COVID symptoms high-risk we discussed rebound she would like to hold on additional medications but will monitor for worsening call if concerns we discussed isolation precautions     No follow-ups on file.    {Documentation Link  Add Vitals   [facility]   Labs   Imaging   Return Letter   PDMP  Chart Review   Meds  Encounter  Results Review  Review full history  Snapshot Message Patient  Follow up:[id number]}    {Use the following Level of Service Charges based on Total Time  42539        5-10 min  46478        11-20 min  26167        21+ min.   (This text will automatically delete):[id number]}

## 2025-07-14 ENCOUNTER — PATIENT MESSAGE (OUTPATIENT)
Dept: ADMINISTRATIVE | Facility: HOSPITAL | Age: 70
End: 2025-07-14
Payer: MEDICARE

## 2025-07-15 ENCOUNTER — PATIENT OUTREACH (OUTPATIENT)
Dept: ADMINISTRATIVE | Facility: HOSPITAL | Age: 70
End: 2025-07-15
Payer: MEDICARE

## 2025-07-15 DIAGNOSIS — Z12.11 ENCOUNTER FOR COLORECTAL CANCER SCREENING: Primary | ICD-10-CM

## 2025-07-15 DIAGNOSIS — Z12.12 ENCOUNTER FOR COLORECTAL CANCER SCREENING: Primary | ICD-10-CM

## 2025-07-15 NOTE — PROGRESS NOTES
HM and immunization's reviewed and updated.  Health Maintenance Due   Topic Date Due    RSV Vaccine (Age 60+ and Pregnant patients) (1 - Risk 60-74 years 1-dose series) Never done    Pneumococcal Vaccines (Age 50+) (2 of 2 - PCV) 08/31/2024    Colorectal Cancer Screening  07/22/2025    Mammogram  09/03/2025     IF HEALTH MAINTENANCE ALREADY DONE, PLEASE RECORDS INFORMATION.      Campaign message -- requesting to schedule colonoscopy. Orders placed, endo department at [phone].

## 2025-07-17 ENCOUNTER — CLINICAL SUPPORT (OUTPATIENT)
Dept: ENDOSCOPY | Facility: HOSPITAL | Age: 70
End: 2025-07-17
Attending: INTERNAL MEDICINE
Payer: MEDICARE

## 2025-07-17 VITALS — BODY MASS INDEX: 31.71 KG/M2 | WEIGHT: 179 LBS

## 2025-07-17 DIAGNOSIS — Z12.11 ENCOUNTER FOR COLORECTAL CANCER SCREENING: ICD-10-CM

## 2025-07-17 DIAGNOSIS — Z12.12 ENCOUNTER FOR COLORECTAL CANCER SCREENING: ICD-10-CM

## 2025-07-17 DIAGNOSIS — Z86.0100 HISTORY OF COLON POLYPS: Primary | ICD-10-CM

## 2025-07-17 DIAGNOSIS — Z12.11 SPECIAL SCREENING FOR MALIGNANT NEOPLASMS, COLON: ICD-10-CM

## 2025-07-17 RX ORDER — SODIUM, POTASSIUM,MAG SULFATES 17.5-3.13G
1 SOLUTION, RECONSTITUTED, ORAL ORAL DAILY
Qty: 1 KIT | Refills: 0 | Status: SHIPPED | OUTPATIENT
Start: 2025-07-17 | End: 2025-07-19

## 2025-07-17 NOTE — PLAN OF CARE
Patient is scheduled for a Colonoscopy on 09/22/25 with Dr. DRE Gallagher  Referral for procedure from PAT appointment

## 2025-08-12 ENCOUNTER — OFFICE VISIT (OUTPATIENT)
Dept: OPHTHALMOLOGY | Facility: CLINIC | Age: 70
End: 2025-08-12
Payer: MEDICARE

## 2025-08-12 DIAGNOSIS — Z94.7 STATUS POST CORNEAL TRANSPLANT: Primary | ICD-10-CM

## 2025-08-12 DIAGNOSIS — T38.0X5A STEROID INDUCED GLAUCOMA, LEFT EYE: ICD-10-CM

## 2025-08-12 DIAGNOSIS — H40.62X0 STEROID INDUCED GLAUCOMA, LEFT EYE: ICD-10-CM

## 2025-08-12 PROCEDURE — 99999 PR PBB SHADOW E&M-EST. PATIENT-LVL III: CPT | Mod: PBBFAC,,,

## 2025-08-21 ENCOUNTER — PATIENT MESSAGE (OUTPATIENT)
Dept: INTERNAL MEDICINE | Facility: CLINIC | Age: 70
End: 2025-08-21
Payer: MEDICARE

## 2025-08-21 RX ORDER — ROSUVASTATIN CALCIUM 5 MG/1
5 TABLET, COATED ORAL DAILY
Qty: 90 TABLET | Refills: 0 | Status: SHIPPED | OUTPATIENT
Start: 2025-08-21

## 2025-08-25 ENCOUNTER — OFFICE VISIT (OUTPATIENT)
Dept: INTERNAL MEDICINE | Facility: CLINIC | Age: 70
End: 2025-08-25
Payer: MEDICARE

## 2025-08-25 ENCOUNTER — LAB VISIT (OUTPATIENT)
Dept: LAB | Facility: HOSPITAL | Age: 70
End: 2025-08-25
Attending: INTERNAL MEDICINE
Payer: MEDICARE

## 2025-08-25 VITALS
OXYGEN SATURATION: 96 % | HEIGHT: 63 IN | SYSTOLIC BLOOD PRESSURE: 116 MMHG | WEIGHT: 176.13 LBS | DIASTOLIC BLOOD PRESSURE: 62 MMHG | BODY MASS INDEX: 31.21 KG/M2 | HEART RATE: 82 BPM

## 2025-08-25 DIAGNOSIS — E55.9 VITAMIN D DEFICIENCY: ICD-10-CM

## 2025-08-25 DIAGNOSIS — Z00.00 ANNUAL PHYSICAL EXAM: ICD-10-CM

## 2025-08-25 DIAGNOSIS — I10 PRIMARY HYPERTENSION: ICD-10-CM

## 2025-08-25 DIAGNOSIS — Z00.00 ANNUAL PHYSICAL EXAM: Primary | ICD-10-CM

## 2025-08-25 DIAGNOSIS — Z85.3 HISTORY OF BREAST CANCER: ICD-10-CM

## 2025-08-25 DIAGNOSIS — H18.519 FUCHS' CORNEAL DYSTROPHY, UNSPECIFIED LATERALITY: ICD-10-CM

## 2025-08-25 DIAGNOSIS — Z23 NEED FOR VACCINATION AGAINST STREPTOCOCCUS PNEUMONIAE: ICD-10-CM

## 2025-08-25 DIAGNOSIS — E78.5 HYPERLIPIDEMIA, UNSPECIFIED HYPERLIPIDEMIA TYPE: ICD-10-CM

## 2025-08-25 LAB
25(OH)D3+25(OH)D2 SERPL-MCNC: 34 NG/ML (ref 30–96)
ABSOLUTE EOSINOPHIL (OHS): 0.09 K/UL
ABSOLUTE MONOCYTE (OHS): 0.36 K/UL (ref 0.3–1)
ABSOLUTE NEUTROPHIL COUNT (OHS): 3.4 K/UL (ref 1.8–7.7)
ALBUMIN SERPL BCP-MCNC: 3.9 G/DL (ref 3.5–5.2)
ALP SERPL-CCNC: 62 UNIT/L (ref 40–150)
ALT SERPL W/O P-5'-P-CCNC: 33 UNIT/L (ref 0–55)
ANION GAP (OHS): 9 MMOL/L (ref 8–16)
AST SERPL-CCNC: 34 UNIT/L (ref 0–50)
BASOPHILS # BLD AUTO: 0.03 K/UL
BASOPHILS NFR BLD AUTO: 0.5 %
BILIRUB SERPL-MCNC: 0.7 MG/DL (ref 0.1–1)
BUN SERPL-MCNC: 12 MG/DL (ref 8–23)
CALCIUM SERPL-MCNC: 9.2 MG/DL (ref 8.7–10.5)
CHLORIDE SERPL-SCNC: 108 MMOL/L (ref 95–110)
CHOLEST SERPL-MCNC: 155 MG/DL (ref 120–199)
CHOLEST/HDLC SERPL: 3.8 {RATIO} (ref 2–5)
CO2 SERPL-SCNC: 24 MMOL/L (ref 23–29)
CREAT SERPL-MCNC: 0.7 MG/DL (ref 0.5–1.4)
EAG (OHS): 105 MG/DL (ref 68–131)
ERYTHROCYTE [DISTWIDTH] IN BLOOD BY AUTOMATED COUNT: 13.4 % (ref 11.5–14.5)
GFR SERPLBLD CREATININE-BSD FMLA CKD-EPI: >60 ML/MIN/1.73/M2
GLUCOSE SERPL-MCNC: 92 MG/DL (ref 70–110)
HBA1C MFR BLD: 5.3 % (ref 4–5.6)
HCT VFR BLD AUTO: 41.3 % (ref 37–48.5)
HDLC SERPL-MCNC: 41 MG/DL (ref 40–75)
HDLC SERPL: 26.5 % (ref 20–50)
HGB BLD-MCNC: 13.5 GM/DL (ref 12–16)
IMM GRANULOCYTES # BLD AUTO: 0.01 K/UL (ref 0–0.04)
IMM GRANULOCYTES NFR BLD AUTO: 0.2 % (ref 0–0.5)
LDLC SERPL CALC-MCNC: 74.2 MG/DL (ref 63–159)
LYMPHOCYTES # BLD AUTO: 1.6 K/UL (ref 1–4.8)
MCH RBC QN AUTO: 28.8 PG (ref 27–31)
MCHC RBC AUTO-ENTMCNC: 32.7 G/DL (ref 32–36)
MCV RBC AUTO: 88 FL (ref 82–98)
NONHDLC SERPL-MCNC: 114 MG/DL
NUCLEATED RBC (/100WBC) (OHS): 0 /100 WBC
PLATELET # BLD AUTO: 249 K/UL (ref 150–450)
PMV BLD AUTO: 10.6 FL (ref 9.2–12.9)
POTASSIUM SERPL-SCNC: 4.2 MMOL/L (ref 3.5–5.1)
PROT SERPL-MCNC: 6.7 GM/DL (ref 6–8.4)
RBC # BLD AUTO: 4.69 M/UL (ref 4–5.4)
RELATIVE EOSINOPHIL (OHS): 1.6 %
RELATIVE LYMPHOCYTE (OHS): 29.1 % (ref 18–48)
RELATIVE MONOCYTE (OHS): 6.6 % (ref 4–15)
RELATIVE NEUTROPHIL (OHS): 62 % (ref 38–73)
SODIUM SERPL-SCNC: 141 MMOL/L (ref 136–145)
TRIGL SERPL-MCNC: 199 MG/DL (ref 30–150)
TSH SERPL-ACNC: 1.47 UIU/ML (ref 0.4–4)
WBC # BLD AUTO: 5.49 K/UL (ref 3.9–12.7)

## 2025-08-25 PROCEDURE — 3008F BODY MASS INDEX DOCD: CPT | Mod: CPTII,HCNC,S$GLB, | Performed by: INTERNAL MEDICINE

## 2025-08-25 PROCEDURE — 84075 ASSAY ALKALINE PHOSPHATASE: CPT | Mod: HCNC

## 2025-08-25 PROCEDURE — 1160F RVW MEDS BY RX/DR IN RCRD: CPT | Mod: CPTII,HCNC,S$GLB, | Performed by: INTERNAL MEDICINE

## 2025-08-25 PROCEDURE — 3078F DIAST BP <80 MM HG: CPT | Mod: CPTII,HCNC,S$GLB, | Performed by: INTERNAL MEDICINE

## 2025-08-25 PROCEDURE — 85025 COMPLETE CBC W/AUTO DIFF WBC: CPT | Mod: HCNC

## 2025-08-25 PROCEDURE — G0009 ADMIN PNEUMOCOCCAL VACCINE: HCPCS | Mod: HCNC,S$GLB,, | Performed by: INTERNAL MEDICINE

## 2025-08-25 PROCEDURE — 82306 VITAMIN D 25 HYDROXY: CPT | Mod: HCNC

## 2025-08-25 PROCEDURE — 1157F ADVNC CARE PLAN IN RCRD: CPT | Mod: CPTII,HCNC,S$GLB, | Performed by: INTERNAL MEDICINE

## 2025-08-25 PROCEDURE — 1101F PT FALLS ASSESS-DOCD LE1/YR: CPT | Mod: CPTII,HCNC,S$GLB, | Performed by: INTERNAL MEDICINE

## 2025-08-25 PROCEDURE — 83036 HEMOGLOBIN GLYCOSYLATED A1C: CPT | Mod: HCNC

## 2025-08-25 PROCEDURE — 3044F HG A1C LEVEL LT 7.0%: CPT | Mod: CPTII,HCNC,S$GLB, | Performed by: INTERNAL MEDICINE

## 2025-08-25 PROCEDURE — 84443 ASSAY THYROID STIM HORMONE: CPT | Mod: HCNC

## 2025-08-25 PROCEDURE — 3074F SYST BP LT 130 MM HG: CPT | Mod: CPTII,HCNC,S$GLB, | Performed by: INTERNAL MEDICINE

## 2025-08-25 PROCEDURE — 36415 COLL VENOUS BLD VENIPUNCTURE: CPT | Mod: HCNC

## 2025-08-25 PROCEDURE — 1159F MED LIST DOCD IN RCRD: CPT | Mod: CPTII,HCNC,S$GLB, | Performed by: INTERNAL MEDICINE

## 2025-08-25 PROCEDURE — 3288F FALL RISK ASSESSMENT DOCD: CPT | Mod: CPTII,HCNC,S$GLB, | Performed by: INTERNAL MEDICINE

## 2025-08-25 PROCEDURE — 99397 PER PM REEVAL EST PAT 65+ YR: CPT | Mod: HCNC,S$GLB,, | Performed by: INTERNAL MEDICINE

## 2025-08-25 PROCEDURE — 80061 LIPID PANEL: CPT | Mod: HCNC

## 2025-08-25 PROCEDURE — 99999 PR PBB SHADOW E&M-EST. PATIENT-LVL III: CPT | Mod: PBBFAC,HCNC,, | Performed by: INTERNAL MEDICINE

## 2025-08-25 PROCEDURE — 90677 PCV20 VACCINE IM: CPT | Mod: HCNC,S$GLB,, | Performed by: INTERNAL MEDICINE

## 2025-09-03 ENCOUNTER — TELEPHONE (OUTPATIENT)
Facility: CLINIC | Age: 70
End: 2025-09-03
Payer: MEDICARE

## 2025-09-04 ENCOUNTER — HOSPITAL ENCOUNTER (OUTPATIENT)
Dept: RADIOLOGY | Facility: OTHER | Age: 70
Discharge: HOME | End: 2025-09-04
Attending: INTERNAL MEDICINE
Payer: MEDICARE

## 2025-09-04 DIAGNOSIS — Z12.31 ENCOUNTER FOR SCREENING MAMMOGRAM FOR BREAST CANCER: ICD-10-CM

## 2025-09-04 PROCEDURE — 77067 SCR MAMMO BI INCL CAD: CPT | Mod: TC,HCNC

## (undated) DEVICE — SOL POVIDONE SCRUB IODINE 4 OZ

## (undated) DEVICE — GLOVE BIOGEL SKINSENSE PI 7.0

## (undated) DEVICE — GLOVE BIOGEL SKINSENSE PI 7.5

## (undated) DEVICE — SPONGE COTTON TRAY 4X4IN

## (undated) DEVICE — SCRUB 10% POVIDONE IODINE 4OZ

## (undated) DEVICE — BANDAGE MATRIX HK LOOP 2IN 5YD

## (undated) DEVICE — Device

## (undated) DEVICE — DRESSING N ADH OIL EMUL 3X3

## (undated) DEVICE — PAD UNDERPAD 30X30

## (undated) DEVICE — TOURNIQUET SB QC DP 18X4IN

## (undated) DEVICE — NDL HYPO REG 25G X 1 1/2

## (undated) DEVICE — BLADE SURG STAINLESS STEEL #15

## (undated) DEVICE — PROBE VITRECTOMY CENTURION 23G

## (undated) DEVICE — FORCEP STRAIGHT DISP

## (undated) DEVICE — SOL BETADINE 5%

## (undated) DEVICE — SPLINT PLASTER FAST SET 5X30IN

## (undated) DEVICE — TANK GAS ISPAN 125GR FOR SF6

## (undated) DEVICE — SYR 10CC LUER LOCK

## (undated) DEVICE — SYR B-D DISP CONTROL 10CC100/C

## (undated) DEVICE — GLOVE SENSICARE PI SURG 7.5

## (undated) DEVICE — NDL HYPO A BEVEL 30X1/2

## (undated) DEVICE — STOPCOCK DISCOFIX 3 WAY

## (undated) DEVICE — SUT 4/0 18IN ETHILON BL P3

## (undated) DEVICE — GLOVE BIOGEL ECLIPSE SZ 7

## (undated) DEVICE — MARKER SKIN RULER STERILE

## (undated) DEVICE — ELECTRODE REM PLYHSV RETURN 9

## (undated) DEVICE — SUT PROLENE 4-0 MONO 18IN

## (undated) DEVICE — SUT 9/0 5IN ETHILON BLK MON

## (undated) DEVICE — SET EXTENSION 30 IN W/LL ROLLE

## (undated) DEVICE — KNIFE ANGLED OPTH

## (undated) DEVICE — GLOVE BIOGEL PI MICRO INDIC 7

## (undated) DEVICE — BANDAGE MATRIX HK LOOP 4IN 5YD

## (undated) DEVICE — DRAPE THREE-QTR REINF 53X77IN

## (undated) DEVICE — DRAPE STERI-DRAPE 1000 17X11IN

## (undated) DEVICE — SUT ETHILON 10/0 CS160-6

## (undated) DEVICE — CANNULA ANTERIOR CHAMBER 30G

## (undated) DEVICE — PUNCH BARRON VACUUM 8.0MM

## (undated) DEVICE — APPLICATOR CHLORAPREP ORN 26ML

## (undated) DEVICE — CORD FOR BIPOLAR FORCEPS 12

## (undated) DEVICE — NDL 22GA X1 1/2 REG BEVEL

## (undated) DEVICE — SUT VICRYL 3-0 27 SH

## (undated) DEVICE — CORD BIPOLAR 12 FOOT

## (undated) DEVICE — GAUZE SPONGE 4X4 12PLY

## (undated) DEVICE — SEE L#120831

## (undated) DEVICE — SUT MCRYL PLUS 4-0 PS2 27IN

## (undated) DEVICE — SUT 4.0 ETHILON

## (undated) DEVICE — DRESSING EYE OVAL LF

## (undated) DEVICE — NDL RETROBULAR AKTKINSON 23G

## (undated) DEVICE — SYR 3CC 21 X 1 LUER LOCK

## (undated) DEVICE — BUCKET PLASTER DISPOSABLE

## (undated) DEVICE — NDL 18GA X1 1/2 REG BEVEL

## (undated) DEVICE — ENDOSERTER CRNL ENDOTHM INSTR

## (undated) DEVICE — DRESSING XEROFORM NONADH 1X8IN

## (undated) DEVICE — PACK UPPER EXTREMITY BAPTIST

## (undated) DEVICE — SOL PVP-I SCRUB 7.5% 4OZ